# Patient Record
Sex: MALE | Race: WHITE | NOT HISPANIC OR LATINO | Employment: FULL TIME | URBAN - METROPOLITAN AREA
[De-identification: names, ages, dates, MRNs, and addresses within clinical notes are randomized per-mention and may not be internally consistent; named-entity substitution may affect disease eponyms.]

---
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
## 2020-02-04 ENCOUNTER — APPOINTMENT (OUTPATIENT)
Dept: RADIOLOGY | Facility: MEDICAL CENTER | Age: 20
End: 2020-02-04
Payer: OTHER MISCELLANEOUS

## 2020-02-04 ENCOUNTER — TRANSCRIBE ORDERS (OUTPATIENT)
Dept: ADMINISTRATIVE | Facility: HOSPITAL | Age: 20
End: 2020-02-04

## 2020-02-04 DIAGNOSIS — S52.501S CLOSED FRACTURE OF DISTAL END OF RIGHT RADIUS, UNSPECIFIED FRACTURE MORPHOLOGY, SEQUELA: ICD-10-CM

## 2020-02-04 DIAGNOSIS — S52.501S CLOSED FRACTURE OF DISTAL END OF RIGHT RADIUS, UNSPECIFIED FRACTURE MORPHOLOGY, SEQUELA: Primary | ICD-10-CM

## 2020-02-04 PROCEDURE — 73100 X-RAY EXAM OF WRIST: CPT

## 2020-07-06 ENCOUNTER — OFFICE VISIT (OUTPATIENT)
Dept: FAMILY MEDICINE CLINIC | Facility: CLINIC | Age: 20
End: 2020-07-06
Payer: COMMERCIAL

## 2020-07-06 VITALS
BODY MASS INDEX: 32.67 KG/M2 | OXYGEN SATURATION: 98 % | HEIGHT: 69 IN | WEIGHT: 220.6 LBS | TEMPERATURE: 98.9 F | SYSTOLIC BLOOD PRESSURE: 128 MMHG | HEART RATE: 109 BPM | DIASTOLIC BLOOD PRESSURE: 78 MMHG

## 2020-07-06 DIAGNOSIS — Z00.00 ANNUAL PHYSICAL EXAM: Primary | ICD-10-CM

## 2020-07-06 PROCEDURE — 99385 PREV VISIT NEW AGE 18-39: CPT | Performed by: PHYSICIAN ASSISTANT

## 2020-07-06 PROCEDURE — 3008F BODY MASS INDEX DOCD: CPT | Performed by: PHYSICIAN ASSISTANT

## 2020-07-06 NOTE — PROGRESS NOTES
[facility]    NAME: Robe Washburn  AGE: 21 y o  SEX: male  : 2000     DATE: 2020     Assessment and Plan:     Problem List Items Addressed This Visit     None      Visit Diagnoses     Annual physical exam    -  Primary    BMI 32 0-32 9,adult            Immunizations and preventive care screenings were discussed with patient today  Appropriate education was printed on patient's after visit summary  Counseling:  Dental Health: discussed importance of regular tooth brushing, flossing, and dental visits  · Exercise: the importance of regular exercise/physical activity was discussed  Recommend exercise 3-5 times per week for at least 30 minutes  Return in 1 year (on 2021) for Annual physical      Chief Complaint:     Chief Complaint   Patient presents with    Physical Exam     permit physical      History of Present Illness:     Adult Annual Physical   Patient here for a comprehensive physical exam  He is here to establish care and needs a 's physical  He is not taking any medications  He does not have any chronic problems  He is trying to get in with his dentist for his routine cleaning  He is due for an eye exam  He wears glasses when needed for far vision  The patient reports no problems  Diet and Physical Activity  · Diet/Nutrition: limited junk food  · Exercise: moderate cardiovascular exercise  Depression Screening  PHQ-9 Depression Screening    PHQ-9:    Frequency of the following problems over the past two weeks:       Little interest or pleasure in doing things:  0 - not at all  Feeling down, depressed, or hopeless:  0 - not at all  PHQ-2 Score:  0       General Health  · Sleep: sleeps well  · Hearing: normal - bilateral   · Vision: wears glasses  · Dental: regular dental visits and brushes teeth twice daily          Review of Systems:     Review of Systems   Constitutional: Negative for chills, diaphoresis, fatigue and fever  HENT: Negative for congestion, ear pain, postnasal drip, rhinorrhea, sneezing, sore throat and trouble swallowing  Eyes: Negative for pain and visual disturbance  Respiratory: Negative for apnea, cough, shortness of breath and wheezing  Cardiovascular: Negative for chest pain and palpitations  Gastrointestinal: Negative for abdominal pain, constipation, diarrhea, nausea and vomiting  Genitourinary: Negative for dysuria and hematuria  Musculoskeletal: Negative for arthralgias, gait problem and myalgias  Neurological: Negative for dizziness, syncope, weakness, light-headedness, numbness and headaches  Psychiatric/Behavioral: Negative for suicidal ideas  The patient is not nervous/anxious  Past Medical History:     History reviewed  No pertinent past medical history  Past Surgical History:     History reviewed  No pertinent surgical history     Social History:        Social History     Socioeconomic History    Marital status: Single     Spouse name: None    Number of children: None    Years of education: None    Highest education level: None   Occupational History    None   Social Needs    Financial resource strain: None    Food insecurity:     Worry: None     Inability: None    Transportation needs:     Medical: None     Non-medical: None   Tobacco Use    Smoking status: Former Smoker    Smokeless tobacco: Never Used   Substance and Sexual Activity    Alcohol use: Yes     Frequency: Monthly or less     Drinks per session: 3 or 4    Drug use: Not Currently    Sexual activity: None   Lifestyle    Physical activity:     Days per week: None     Minutes per session: None    Stress: None   Relationships    Social connections:     Talks on phone: None     Gets together: None     Attends Restorationism service: None     Active member of club or organization: None     Attends meetings of clubs or organizations: None     Relationship status: None    Intimate partner violence:     Fear of current or ex partner: None     Emotionally abused: None     Physically abused: None     Forced sexual activity: None   Other Topics Concern    None   Social History Narrative    None      Family History:     Family History   Problem Relation Age of Onset    No Known Problems Mother     No Known Problems Father       Current Medications:     No current outpatient medications on file  No current facility-administered medications for this visit  Allergies:     No Known Allergies   Physical Exam:     /78   Pulse (!) 109   Temp 98 9 °F (37 2 °C)   Ht 5' 9" (1 753 m)   Wt 100 kg (220 lb 9 6 oz)   SpO2 98%   BMI 32 58 kg/m²     Physical Exam   Constitutional: He is oriented to person, place, and time  He appears well-developed and well-nourished  HENT:   Head: Normocephalic and atraumatic  Right Ear: Tympanic membrane, external ear and ear canal normal    Left Ear: Tympanic membrane, external ear and ear canal normal    Nose: Nose normal    Mouth/Throat: Oropharynx is clear and moist and mucous membranes are normal  No oropharyngeal exudate, posterior oropharyngeal edema or posterior oropharyngeal erythema  Eyes: Pupils are equal, round, and reactive to light  EOM are normal    Neck: Normal range of motion  Neck supple  Cardiovascular: Normal rate, regular rhythm and normal heart sounds  Exam reveals no gallop and no friction rub  No murmur heard  Pulmonary/Chest: Effort normal and breath sounds normal  No respiratory distress  He has no wheezes  He has no rales  Abdominal: Soft  Bowel sounds are normal  There is no tenderness  There is no rebound and no guarding  Musculoskeletal: Normal range of motion  Lymphadenopathy:     He has no cervical adenopathy  Neurological: He is alert and oriented to person, place, and time  Skin: Skin is warm and dry  Psychiatric: He has a normal mood and affect   His behavior is normal  Judgment and thought content normal    Vitals reviewed  [facility]     BMI Counseling: Body mass index is 32 58 kg/m²  The BMI is above normal  Nutrition recommendations include decreasing overall calorie intake, 3-5 servings of fruits/vegetables daily and consuming healthier snacks  Exercise recommendations include exercising 3-5 times per week

## 2020-07-06 NOTE — PATIENT INSTRUCTIONS

## 2020-09-14 ENCOUNTER — TELEMEDICINE (OUTPATIENT)
Dept: FAMILY MEDICINE CLINIC | Facility: CLINIC | Age: 20
End: 2020-09-14
Payer: COMMERCIAL

## 2020-09-14 VITALS — WEIGHT: 220 LBS | HEIGHT: 69 IN | BODY MASS INDEX: 32.58 KG/M2

## 2020-09-14 DIAGNOSIS — Z20.828 EXPOSURE TO SARS-ASSOCIATED CORONAVIRUS: Primary | ICD-10-CM

## 2020-09-14 PROCEDURE — 3725F SCREEN DEPRESSION PERFORMED: CPT | Performed by: PHYSICIAN ASSISTANT

## 2020-09-14 PROCEDURE — 99212 OFFICE O/P EST SF 10 MIN: CPT | Performed by: PHYSICIAN ASSISTANT

## 2020-09-14 PROCEDURE — U0003 INFECTIOUS AGENT DETECTION BY NUCLEIC ACID (DNA OR RNA); SEVERE ACUTE RESPIRATORY SYNDROME CORONAVIRUS 2 (SARS-COV-2) (CORONAVIRUS DISEASE [COVID-19]), AMPLIFIED PROBE TECHNIQUE, MAKING USE OF HIGH THROUGHPUT TECHNOLOGIES AS DESCRIBED BY CMS-2020-01-R: HCPCS | Performed by: PHYSICIAN ASSISTANT

## 2020-09-14 NOTE — PROGRESS NOTES
COVID-19 Virtual Visit     Assessment/Plan:    Problem List Items Addressed This Visit     None      Visit Diagnoses     Exposure to SARS-associated coronavirus    -  Primary    Relevant Orders    Novel Coronavirus (COVID-19), PCR [facility] - Collected in Office        This virtual check-in was done via [email or web address] and patient was informed that this is not a secure, HIPAA-complaint platform  He agrees to proceed     Disposition:      I recommended self-quarantine for 14 days and to call back for worsening symptoms or development of shortness of breath and I recommended Jose Victor come to our office for a nasal swab for COVID-19    I spent 10 minutes directly with the patient during this visit     I advised him to stay under isolation until he receives the results of his COVID test  I recommended that he push fluids, continue symptomatic relief, and advance diet as tolerated  He will notify us of any new or worsening symptoms  Encounter provider Tank Cabrera PA-C    Provider located at [address] [address]    Recent Visits  No visits were found meeting these conditions  Showing recent visits within past 7 days and meeting all other requirements     Today's Visits  Date Type Provider Dept   09/14/20 Telemedicine Tank Cabrera PA-C [facility]   Showing today's visits and meeting all other requirements     Future Appointments  No visits were found meeting these conditions  Showing future appointments within next 150 days and meeting all other requirements        Patient agrees to participate in a virtual check in via telephone or video visit instead of presenting to the office to address urgent/immediate medical needs  Patient is aware this is a billable service  After connecting through [employer], the patient was identified by name and date of birth   Sahil Somerset was informed that this was a telemedicine visit and that the exam was being conducted confidentially over secure lines  My office door was closed  No one else was in the room  Christie Montoya acknowledged consent and understanding of privacy and security of the telemedicine visit  I informed the patient that I have reviewed his record in Epic and presented the opportunity for him to ask any questions regarding the visit today  The patient agreed to participate  Subjective  Christie Montoya is a 21 y o  male who is concerned about COVID-19  He reports headache, fatigue, abdominal pain, nausea and vomiting  He has not experienced fever, chills, repeated shaking with chills, cough, shortness of breath, sore throat, anorexia, myalgias, anosmia and diarrhea He has not had contact with a person who is under investigation for or who is positive for COVID-19 within the last 14 days  He has not been hospitalized recently for fever and/or lower respiratory symptoms  Carol Record is here today complaining of headache, body aches, abdominal pain, and fatigue  It started on Saturday, 9/12/20 while at work  He was sent home from work  He did vomit once while at work  Since being home he has not had anymore episodes of vomiting  He has been eating and drinking  He denies any diarrhea, chest pains, shortness of breath, anosmia, fevers or chills  He has had a stuffy nose and sore throat first thing in the morning for the past few weeks, but that resolves as the day goes on  He admits that the abdominal pain and headache have slightly improved  He has been taking ibuprofen as needed for the headache  He denies any known sick contacts  He denies any recent travel  History reviewed  No pertinent past medical history  History reviewed  No pertinent surgical history  No current outpatient medications on file  No current facility-administered medications for this visit  No Known Allergies    Review of Systems   Constitutional: Positive for fatigue  Negative for chills, diaphoresis and fever  HENT: Positive for congestion  Negative for ear pain, postnasal drip, rhinorrhea, sneezing, sore throat and trouble swallowing  Eyes: Negative for pain and visual disturbance  Respiratory: Negative for apnea, cough, shortness of breath and wheezing  Cardiovascular: Negative for chest pain and palpitations  Gastrointestinal: Positive for abdominal pain, nausea and vomiting  Negative for constipation and diarrhea  Genitourinary: Negative for dysuria and hematuria  Musculoskeletal: Positive for myalgias  Negative for arthralgias and gait problem  Neurological: Positive for headaches  Negative for dizziness, syncope, weakness, light-headedness and numbness  Psychiatric/Behavioral: Negative for suicidal ideas  The patient is not nervous/anxious  Video Exam    Vitals:    09/14/20 1427   Weight: 99 8 kg (220 lb)   Height: 5' 9" (1 753 m)     Randall Dee appears alert, no distress, cooperative  Physical Exam  Vitals signs and nursing note reviewed  Constitutional:       General: He is not in acute distress  Appearance: He is well-developed  He is not ill-appearing, toxic-appearing or diaphoretic  HENT:      Head: Normocephalic and atraumatic  Pulmonary:      Effort: No respiratory distress (Patient is speaking in full, fluent sentences)  Neurological:      Mental Status: He is alert and oriented to person, place, and time  Psychiatric:         Behavior: Behavior normal  Behavior is cooperative  Thought Content: Thought content normal          Judgment: Judgment normal           VIRTUAL VISIT DISCLAIMER    Adenike Reese acknowledges that he has consented to an online visit or consultation  He understands that the online visit is based solely on information provided by him, and that, in the absence of a face-to-face physical evaluation by the physician, the diagnosis he receives is both limited and provisional in terms of accuracy and completeness   This is not intended to replace a full medical face-to-face evaluation by the physician  Jose Adams understands and accepts these terms

## 2020-09-15 LAB — SARS-COV-2 RNA SPEC QL NAA+PROBE: NOT DETECTED

## 2020-09-16 ENCOUNTER — TELEPHONE (OUTPATIENT)
Dept: FAMILY MEDICINE CLINIC | Facility: CLINIC | Age: 20
End: 2020-09-16

## 2020-09-16 NOTE — TELEPHONE ENCOUNTER
Patient was negative for COVID, needs note stating so and that he can return to work  Please call Caitlin Egan when finished

## 2020-09-19 ENCOUNTER — APPOINTMENT (EMERGENCY)
Dept: CT IMAGING | Facility: HOSPITAL | Age: 20
End: 2020-09-19
Payer: COMMERCIAL

## 2020-09-19 ENCOUNTER — HOSPITAL ENCOUNTER (EMERGENCY)
Facility: HOSPITAL | Age: 20
Discharge: HOME | End: 2020-09-19
Attending: EMERGENCY MEDICINE | Admitting: EMERGENCY MEDICINE
Payer: COMMERCIAL

## 2020-09-19 VITALS
WEIGHT: 220.02 LBS | OXYGEN SATURATION: 94 % | HEART RATE: 66 BPM | DIASTOLIC BLOOD PRESSURE: 74 MMHG | HEIGHT: 69 IN | RESPIRATION RATE: 16 BRPM | SYSTOLIC BLOOD PRESSURE: 118 MMHG | TEMPERATURE: 97.8 F | BODY MASS INDEX: 32.59 KG/M2

## 2020-09-19 DIAGNOSIS — K57.90 DIVERTICULOSIS: ICD-10-CM

## 2020-09-19 DIAGNOSIS — R10.9 ABDOMINAL PAIN: Primary | ICD-10-CM

## 2020-09-19 DIAGNOSIS — K59.00 CONSTIPATION: ICD-10-CM

## 2020-09-19 LAB
ALBUMIN SERPL BCP-MCNC: 4 G/DL (ref 3.5–5)
ALP SERPL-CCNC: 104 U/L (ref 46–116)
ALT SERPL W P-5'-P-CCNC: 58 U/L (ref 12–78)
ANION GAP SERPL CALCULATED.3IONS-SCNC: 6 MMOL/L (ref 4–13)
AST SERPL W P-5'-P-CCNC: 37 U/L (ref 5–45)
BASOPHILS # BLD AUTO: 0.06 THOUSANDS/ΜL (ref 0–0.1)
BASOPHILS NFR BLD AUTO: 1 % (ref 0–1)
BILIRUB SERPL-MCNC: 0.5 MG/DL (ref 0.2–1)
BILIRUB UR QL STRIP: NEGATIVE
BUN SERPL-MCNC: 12 MG/DL (ref 5–25)
CALCIUM SERPL-MCNC: 9.2 MG/DL (ref 8.3–10.1)
CHLORIDE SERPL-SCNC: 103 MMOL/L (ref 100–108)
CLARITY UR: CLEAR
CO2 SERPL-SCNC: 30 MMOL/L (ref 21–32)
COLOR UR: YELLOW
CREAT SERPL-MCNC: 0.85 MG/DL (ref 0.6–1.3)
EOSINOPHIL # BLD AUTO: 0.12 THOUSAND/ΜL (ref 0–0.61)
EOSINOPHIL NFR BLD AUTO: 2 % (ref 0–6)
ERYTHROCYTE [DISTWIDTH] IN BLOOD BY AUTOMATED COUNT: 12.1 % (ref 11.6–15.1)
GFR SERPL CREATININE-BSD FRML MDRD: 125 ML/MIN/1.73SQ M
GLUCOSE SERPL-MCNC: 92 MG/DL (ref 65–140)
GLUCOSE UR STRIP-MCNC: NEGATIVE MG/DL
HCT VFR BLD AUTO: 42.3 % (ref 36.5–49.3)
HGB BLD-MCNC: 14.6 G/DL (ref 12–17)
HGB UR QL STRIP.AUTO: NEGATIVE
IMM GRANULOCYTES # BLD AUTO: 0.01 THOUSAND/UL (ref 0–0.2)
IMM GRANULOCYTES NFR BLD AUTO: 0 % (ref 0–2)
KETONES UR STRIP-MCNC: NEGATIVE MG/DL
LEUKOCYTE ESTERASE UR QL STRIP: NEGATIVE
LIPASE SERPL-CCNC: 72 U/L (ref 73–393)
LYMPHOCYTES # BLD AUTO: 2.35 THOUSANDS/ΜL (ref 0.6–4.47)
LYMPHOCYTES NFR BLD AUTO: 36 % (ref 14–44)
MCH RBC QN AUTO: 31.2 PG (ref 26.8–34.3)
MCHC RBC AUTO-ENTMCNC: 34.5 G/DL (ref 31.4–37.4)
MCV RBC AUTO: 90 FL (ref 82–98)
MONOCYTES # BLD AUTO: 0.94 THOUSAND/ΜL (ref 0.17–1.22)
MONOCYTES NFR BLD AUTO: 15 % (ref 4–12)
NEUTROPHILS # BLD AUTO: 3 THOUSANDS/ΜL (ref 1.85–7.62)
NEUTS SEG NFR BLD AUTO: 46 % (ref 43–75)
NITRITE UR QL STRIP: NEGATIVE
NRBC BLD AUTO-RTO: 0 /100 WBCS
PH UR STRIP.AUTO: 6.5 [PH]
PLATELET # BLD AUTO: 255 THOUSANDS/UL (ref 149–390)
PMV BLD AUTO: 10.1 FL (ref 8.9–12.7)
POTASSIUM SERPL-SCNC: 4.2 MMOL/L (ref 3.5–5.3)
PROT SERPL-MCNC: 7.7 G/DL (ref 6.4–8.2)
PROT UR STRIP-MCNC: NEGATIVE MG/DL
RBC # BLD AUTO: 4.68 MILLION/UL (ref 3.88–5.62)
SODIUM SERPL-SCNC: 139 MMOL/L (ref 136–145)
SP GR UR STRIP.AUTO: 1.01 (ref 1–1.03)
UROBILINOGEN UR QL STRIP.AUTO: 0.2 E.U./DL
WBC # BLD AUTO: 6.48 THOUSAND/UL (ref 4.31–10.16)

## 2020-09-19 PROCEDURE — 83690 ASSAY OF LIPASE: CPT | Performed by: EMERGENCY MEDICINE

## 2020-09-19 PROCEDURE — 96360 HYDRATION IV INFUSION INIT: CPT

## 2020-09-19 PROCEDURE — 81003 URINALYSIS AUTO W/O SCOPE: CPT | Performed by: EMERGENCY MEDICINE

## 2020-09-19 PROCEDURE — G1004 CDSM NDSC: HCPCS

## 2020-09-19 PROCEDURE — 74177 CT ABD & PELVIS W/CONTRAST: CPT

## 2020-09-19 PROCEDURE — 99284 EMERGENCY DEPT VISIT MOD MDM: CPT | Performed by: EMERGENCY MEDICINE

## 2020-09-19 PROCEDURE — 36415 COLL VENOUS BLD VENIPUNCTURE: CPT | Performed by: EMERGENCY MEDICINE

## 2020-09-19 PROCEDURE — 85025 COMPLETE CBC W/AUTO DIFF WBC: CPT | Performed by: EMERGENCY MEDICINE

## 2020-09-19 PROCEDURE — 80053 COMPREHEN METABOLIC PANEL: CPT | Performed by: EMERGENCY MEDICINE

## 2020-09-19 PROCEDURE — 99284 EMERGENCY DEPT VISIT MOD MDM: CPT

## 2020-09-19 RX ORDER — ACETAMINOPHEN 325 MG/1
650 TABLET ORAL EVERY 6 HOURS PRN
COMMUNITY

## 2020-09-19 RX ORDER — FAMOTIDINE 20 MG/1
20 TABLET, FILM COATED ORAL 2 TIMES DAILY
Qty: 14 TABLET | Refills: 0 | Status: SHIPPED | OUTPATIENT
Start: 2020-09-19 | End: 2020-09-26

## 2020-09-19 RX ADMIN — SODIUM CHLORIDE 1000 ML: 0.9 INJECTION, SOLUTION INTRAVENOUS at 08:22

## 2020-09-19 RX ADMIN — IOHEXOL 100 ML: 350 INJECTION, SOLUTION INTRAVENOUS at 08:59

## 2020-09-19 NOTE — ED PROVIDER NOTES
History  Chief Complaint   Patient presents with    Abdominal Pain     pt states intermittent abdominal pain for about 1 week  in mid abdomen and into RLQ  worse this am     26-year-old male presents complaining of right lower quadrant periumbilical abdominal pain x1 week  Patient states he has had the pain on off for about a week  He states that when he went to work today at approximately 0600 hours the pain intensified  He denies pain into the groin or scrotum or testicles  He denies nausea or vomiting  Denies chest pain or shortness of breath  He states that pain gets worse when he has bowel movement  History provided by:  Patient  Abdominal Pain   Pain location:  RLQ and periumbilical  Pain quality: aching and cramping    Pain radiates to:  Does not radiate  Pain severity:  Mild  Onset quality:  Gradual  Duration:  1 week  Timing:  Intermittent  Progression:  Waxing and waning  Context: not alcohol use, not awakening from sleep, not diet changes and not eating    Relieved by:  Nothing  Worsened by: Bowel movements  Ineffective treatments:  None tried  Associated symptoms: no anorexia, no belching, no chest pain and no chills    Risk factors: no alcohol abuse and no aspirin use        Prior to Admission Medications   Prescriptions Last Dose Informant Patient Reported? Taking?   acetaminophen (TYLENOL) 325 mg tablet   Yes Yes   Sig: Take 650 mg by mouth every 6 (six) hours as needed for mild pain      Facility-Administered Medications: None       History reviewed  No pertinent past medical history  History reviewed  No pertinent surgical history  Family History   Problem Relation Age of Onset    No Known Problems Mother     No Known Problems Father      I have reviewed and agree with the history as documented      E-Cigarette/Vaping    E-Cigarette Use Former User      E-Cigarette/Vaping Substances     Social History     Tobacco Use    Smoking status: Former Smoker    Smokeless tobacco: Never Used   Substance Use Topics    Alcohol use: Not Currently     Frequency: Monthly or less     Drinks per session: 3 or 4    Drug use: Not Currently       Review of Systems   Constitutional: Negative for chills  HENT: Negative  Eyes: Negative  Respiratory: Negative  Cardiovascular: Negative for chest pain  Gastrointestinal: Positive for abdominal pain  Negative for anorexia  Endocrine: Negative  Genitourinary: Negative  Musculoskeletal: Negative  Skin: Negative  Allergic/Immunologic: Negative  Neurological: Negative  Hematological: Negative  Psychiatric/Behavioral: Negative  All other systems reviewed and are negative  Physical Exam  Physical Exam  Vitals signs reviewed  HENT:      Head: Normocephalic  Mouth/Throat:      Mouth: Mucous membranes are moist    Eyes:      Extraocular Movements: Extraocular movements intact  Cardiovascular:      Rate and Rhythm: Normal rate  Heart sounds: Normal heart sounds  Pulmonary:      Effort: Pulmonary effort is normal    Abdominal:      General: Bowel sounds are normal       Tenderness: There is abdominal tenderness in the right lower quadrant and periumbilical area  There is no right CVA tenderness or guarding  Negative signs include Segundo's sign and McBurney's sign  Hernia: No hernia is present  Skin:     Capillary Refill: Capillary refill takes less than 2 seconds  Coloration: Skin is not cyanotic, jaundiced or mottled  Neurological:      General: No focal deficit present  Mental Status: He is alert     Psychiatric:         Mood and Affect: Mood normal          Vital Signs  ED Triage Vitals [09/19/20 0803]   Temperature Pulse Respirations Blood Pressure SpO2   97 8 °F (36 6 °C) 74 16 145/77 95 %      Temp Source Heart Rate Source Patient Position - Orthostatic VS BP Location FiO2 (%)   Temporal Monitor Sitting Left arm --      Pain Score       9           Vitals:    09/19/20 0803 09/19/20 0830 09/19/20 0900   BP: 145/77 128/74 118/74   Pulse: 74 78 66   Patient Position - Orthostatic VS: Sitting           Visual Acuity      ED Medications  Medications   sodium chloride 0 9 % bolus 1,000 mL (0 mL Intravenous Stopped 9/19/20 0923)   iohexol (OMNIPAQUE) 350 MG/ML injection (MULTI-DOSE) 100 mL (100 mL Intravenous Given 9/19/20 0859)       Diagnostic Studies  Results Reviewed     Procedure Component Value Units Date/Time    UA w Reflex to Microscopic w Reflex to Culture [[id number]] Collected:  09/19/20 0923    Lab Status:  Final result Specimen:  Urine, Clean Catch Updated:  09/19/20 0936     Color, UA Yellow     Clarity, UA Clear     Specific Gravity, UA 1 010     pH, UA 6 5     Leukocytes, UA Negative     Nitrite, UA Negative     Protein, UA Negative mg/dl      Glucose, UA Negative mg/dl      Ketones, UA Negative mg/dl      Urobilinogen, UA 0 2 E U /dl      Bilirubin, UA Negative     Blood, UA Negative    Comprehensive metabolic panel [[id number]] Collected:  09/19/20 0820    Lab Status:  Final result Specimen:  Blood from Arm, Right Updated:  09/19/20 0843     Sodium 139 mmol/L      Potassium 4 2 mmol/L      Chloride 103 mmol/L      CO2 30 mmol/L      ANION GAP 6 mmol/L      BUN 12 mg/dL      Creatinine 0 85 mg/dL      Glucose 92 mg/dL      Calcium 9 2 mg/dL      AST 37 U/L      ALT 58 U/L      Alkaline Phosphatase 104 U/L      Total Protein 7 7 g/dL      Albumin 4 0 g/dL      Total Bilirubin 0 50 mg/dL      eGFR 125 ml/min/1 73sq m     Narrative:       [place]ney guidelines for Chronic Kidney Disease (CKD):     Stage 1 with normal or high GFR (GFR > 90 mL/min/1 73 square meters)    Stage 2 Mild CKD (GFR = 60-89 mL/min/1 73 square meters)    Stage 3A Moderate CKD (GFR = 45-59 mL/min/1 73 square meters)    Stage 3B Moderate CKD (GFR = 30-44 mL/min/1 73 square meters)    Stage 4 Severe CKD (GFR = 15-29 mL/min/1 73 square meters)    Stage 5 End Stage CKD (GFR <15 mL/min/1 73 square meters)  Note: GFR calculation is accurate only with a steady state creatinine    Lipase [[id number]]  (Abnormal) Collected:  09/19/20 0820    Lab Status:  Final result Specimen:  Blood from Arm, Right Updated:  09/19/20 0837     Lipase 72 u/L     CBC and differential [[id number]]  (Abnormal) Collected:  09/19/20 0831    Lab Status:  Final result Specimen:  Blood from Hand, Left Updated:  09/19/20 0836     WBC 6 48 Thousand/uL      RBC 4 68 Million/uL      Hemoglobin 14 6 g/dL      Hematocrit 42 3 %      MCV 90 fL      MCH 31 2 pg      MCHC 34 5 g/dL      RDW 12 1 %      MPV 10 1 fL      Platelets 309 Thousands/uL      nRBC 0 /100 WBCs      Neutrophils Relative 46 %      Immat GRANS % 0 %      Lymphocytes Relative 36 %      Monocytes Relative 15 %      Eosinophils Relative 2 %      Basophils Relative 1 %      Neutrophils Absolute 3 00 Thousands/µL      Immature Grans Absolute 0 01 Thousand/uL      Lymphocytes Absolute 2 35 Thousands/µL      Monocytes Absolute 0 94 Thousand/µL      Eosinophils Absolute 0 12 Thousand/µL      Basophils Absolute 0 06 Thousands/µL                  CT abdomen pelvis with contrast   Final Result by Carmen Juárez DO (09/19 0579)   No CT evidence of acute appendicitis  Workstation performed: [id number]                    Procedures  Procedures         ED Course                                       MDM  Number of Diagnoses or Management Options  Diagnosis management comments: Differential diagnosis 1  Pedis eyes 2  Colitis 3  Diverticulitis 4   Bowel obstruction       Amount and/or Complexity of Data Reviewed  Clinical lab tests: ordered and reviewed  Tests in the radiology section of CPT®: ordered and reviewed  Tests in the medicine section of CPT®: reviewed and ordered    Risk of Complications, Morbidity, and/or Mortality  Presenting problems: high  Diagnostic procedures: high  Management options: high        Disposition  Final diagnoses:   Abdominal pain   Diverticulosis Constipation     Time reflects when diagnosis was documented in both MDM as applicable and the Disposition within this note     Time User Action Codes Description Comment    9/19/2020  8:07 AM Claybon Ora Add [R10 9] Abdominal pain     9/19/2020  9:22 AM Claybon Ora Add [K57 90] Diverticulosis     9/19/2020  9:22 AM Claybon Ora Add [K59 00] Constipation       ED Disposition     ED Disposition Condition Date/Time Comment    Discharge Stable Sat Sep 19, 2020  8:07 AM Segundo Hinesl discharge to home/self care  Follow-up Information     Follow up With Specialties Details Why Contact Info Additional [address], PA-C Family Medicine, Physician Assistant   [address] [address]  [address]        [facility]   [address] [address]  [address] [address] [facility] Marissa Vargas [address] [place] [address], [id number] [phone]          Patient's Medications   Discharge Prescriptions    No medications on file     No discharge procedures on file      PDMP Review     None          ED Provider  Electronically Signed by           Tova Coreas DO  09/19/20 5157

## 2020-09-19 NOTE — Clinical Note
Brainkj Pegtigre was seen and treated in our emergency department on 9/19/2020  Diagnosis:     Merlinda Rock    He may return on this date:     Excuse on 9/19/20      If you have any questions or concerns, please don't hesitate to call        Jose Manuel Bryson DO    ______________________________           _______________          _______________  Hospital Representative                              Date                                Time

## 2020-09-19 NOTE — Clinical Note
Segundo Romero was seen and treated in our emergency department on 9/19/2020  Diagnosis:     Lisette Lincoln    He may return on this date:     Excuse on 9/19/20      If you have any questions or concerns, please don't hesitate to call        Tova Coreas DO    ______________________________           _______________          _______________  Hospital Representative                              Date                                Time

## 2020-09-22 ENCOUNTER — OFFICE VISIT (OUTPATIENT)
Dept: FAMILY MEDICINE CLINIC | Facility: CLINIC | Age: 20
End: 2020-09-22
Payer: COMMERCIAL

## 2020-09-22 VITALS
OXYGEN SATURATION: 94 % | WEIGHT: 223.8 LBS | BODY MASS INDEX: 33.15 KG/M2 | SYSTOLIC BLOOD PRESSURE: 122 MMHG | DIASTOLIC BLOOD PRESSURE: 70 MMHG | HEART RATE: 124 BPM | TEMPERATURE: 97.6 F | HEIGHT: 69 IN

## 2020-09-22 DIAGNOSIS — K59.00 CONSTIPATION, UNSPECIFIED CONSTIPATION TYPE: Primary | ICD-10-CM

## 2020-09-22 PROCEDURE — 1036F TOBACCO NON-USER: CPT | Performed by: PHYSICIAN ASSISTANT

## 2020-09-22 PROCEDURE — 99213 OFFICE O/P EST LOW 20 MIN: CPT | Performed by: PHYSICIAN ASSISTANT

## 2020-09-22 NOTE — PROGRESS NOTES
Assessment/Plan:    Problem List Items Addressed This Visit     None      Visit Diagnoses     Constipation, unspecified constipation type    -  Primary           Diagnoses and all orders for this visit:    Constipation, unspecified constipation type        I advised Emily Rodriguez to continue with the Miralax  Push fluids and make sure he is increasing his intake of fiber  Suggested trying a suppository if that does not help  He will let us know if symptoms do not improve or worsen  Subjective:      Patient ID: Alonso Toscano is a 21 y o  male  Emily Rodriguez is here today complaining of constipation and abdominal pain  On Saturday morning he started with some abdominal pain  He felt like he needed to have a bowl movement, but couldn't  He went to the ER  They did a CT scan, which did not show a bowel blockage or any acute processes  They advised him to start Miralax and push fluids  Sunday he took one dose of Miralax and had 2 small bowel movements  He felt slightly better after having the bowel movement  He took another dose of Miralax on Monday, and had 2 small bowel movements again  He did not take anymore Miralax because he was not sure how long he could take it  He is starting again with some lower abdominal pain and pressure  He has not had a bowel movement so far today  He denies any fevers, chills, blood in his stool, nausea, or vomiting  He has been passing flatus  He denies any other concerns at this time  The following portions of the patient's history were reviewed and updated as appropriate:   He has no past medical history on file  ,  does not have a problem list on file  ,   has no past surgical history on file  ,  family history includes No Known Problems in his father and mother  ,   reports that he has quit smoking  He has never used smokeless tobacco  He reports previous alcohol use  He reports previous drug use ,  has No Known Allergies     Current Outpatient Medications   Medication Sig Dispense Refill    acetaminophen (TYLENOL) 325 mg tablet Take 650 mg by mouth every 6 (six) hours as needed for mild pain      famotidine (PEPCID) 20 mg tablet Take 1 tablet (20 mg total) by mouth 2 (two) times a day for 7 days (Patient not taking: Reported on 9/22/2020) 14 tablet 0     No current facility-administered medications for this visit  Review of Systems   Constitutional: Negative for chills, diaphoresis, fatigue and fever  HENT: Negative for congestion, ear pain, postnasal drip, rhinorrhea, sneezing, sore throat and trouble swallowing  Eyes: Negative for pain and visual disturbance  Respiratory: Negative for apnea, cough, shortness of breath and wheezing  Cardiovascular: Negative for chest pain and palpitations  Gastrointestinal: Positive for abdominal pain and constipation  Negative for diarrhea, nausea and vomiting  Genitourinary: Negative for dysuria and hematuria  Musculoskeletal: Negative for arthralgias, gait problem and myalgias  Neurological: Negative for dizziness, syncope, weakness, light-headedness, numbness and headaches  Psychiatric/Behavioral: Negative for suicidal ideas  The patient is not nervous/anxious  Objective:  Vitals:    09/22/20 1228   BP: 122/70   Pulse: (!) 124   Temp: 97 6 °F (36 4 °C)   SpO2: 94%   Weight: 102 kg (223 lb 12 8 oz)   Height: 5' 9" (1 753 m)     Body mass index is 33 05 kg/m²  Physical Exam  Vitals signs and nursing note reviewed  Constitutional:       Appearance: He is well-developed  HENT:      Head: Normocephalic and atraumatic  Right Ear: Tympanic membrane, ear canal and external ear normal       Left Ear: Tympanic membrane, ear canal and external ear normal       Nose: Nose normal       Mouth/Throat:      Pharynx: No oropharyngeal exudate or posterior oropharyngeal erythema  Eyes:      Pupils: Pupils are equal, round, and reactive to light  Neck:      Musculoskeletal: Normal range of motion and neck supple  Cardiovascular:      Rate and Rhythm: Normal rate and regular rhythm  Heart sounds: Normal heart sounds  No murmur  No friction rub  No gallop  Pulmonary:      Effort: Pulmonary effort is normal  No respiratory distress  Breath sounds: Normal breath sounds  No wheezing or rales  Abdominal:      General: Bowel sounds are normal       Palpations: Abdomen is soft  Tenderness: There is abdominal tenderness (Mild left lower abdominal tenderness to palpation  )  There is no guarding or rebound  Musculoskeletal: Normal range of motion  Lymphadenopathy:      Cervical: No cervical adenopathy  Skin:     General: Skin is warm and dry  Neurological:      Mental Status: He is alert and oriented to person, place, and time  Psychiatric:         Behavior: Behavior normal          Thought Content:  Thought content normal          Judgment: Judgment normal

## 2020-11-11 ENCOUNTER — TELEPHONE (OUTPATIENT)
Dept: FAMILY MEDICINE CLINIC | Facility: CLINIC | Age: 20
End: 2020-11-11

## 2021-01-24 DIAGNOSIS — Z23 ENCOUNTER FOR IMMUNIZATION: ICD-10-CM

## 2021-01-28 ENCOUNTER — IMMUNIZATIONS (OUTPATIENT)
Dept: FAMILY MEDICINE CLINIC | Facility: HOSPITAL | Age: 21
End: 2021-01-28

## 2021-01-28 DIAGNOSIS — Z23 ENCOUNTER FOR IMMUNIZATION: Primary | ICD-10-CM

## 2021-01-28 PROCEDURE — 91301 SARS-COV-2 / COVID-19 MRNA VACCINE (MODERNA) 100 MCG: CPT

## 2021-01-28 PROCEDURE — 0011A SARS-COV-2 / COVID-19 MRNA VACCINE (MODERNA) 100 MCG: CPT

## 2021-02-23 ENCOUNTER — IMMUNIZATIONS (OUTPATIENT)
Dept: FAMILY MEDICINE CLINIC | Facility: HOSPITAL | Age: 21
End: 2021-02-23

## 2021-02-23 DIAGNOSIS — Z23 ENCOUNTER FOR IMMUNIZATION: Primary | ICD-10-CM

## 2021-02-23 PROCEDURE — 0012A SARS-COV-2 / COVID-19 MRNA VACCINE (MODERNA) 100 MCG: CPT

## 2021-02-23 PROCEDURE — 91301 SARS-COV-2 / COVID-19 MRNA VACCINE (MODERNA) 100 MCG: CPT

## 2021-06-09 ENCOUNTER — TELEMEDICINE (OUTPATIENT)
Dept: FAMILY MEDICINE CLINIC | Facility: CLINIC | Age: 21
End: 2021-06-09
Payer: COMMERCIAL

## 2021-06-09 VITALS — HEIGHT: 69 IN | BODY MASS INDEX: 33.03 KG/M2 | TEMPERATURE: 97.9 F | WEIGHT: 223 LBS

## 2021-06-09 DIAGNOSIS — J06.9 UPPER RESPIRATORY TRACT INFECTION, UNSPECIFIED TYPE: Primary | ICD-10-CM

## 2021-06-09 PROCEDURE — 99213 OFFICE O/P EST LOW 20 MIN: CPT | Performed by: PHYSICIAN ASSISTANT

## 2021-06-09 PROCEDURE — 3008F BODY MASS INDEX DOCD: CPT | Performed by: PHYSICIAN ASSISTANT

## 2021-06-09 RX ORDER — PENICILLIN V POTASSIUM 500 MG/1
TABLET ORAL
COMMUNITY
Start: 2021-05-26 | End: 2021-11-09 | Stop reason: ALTCHOICE

## 2021-06-09 RX ORDER — FLUTICASONE PROPIONATE 50 MCG
1 SPRAY, SUSPENSION (ML) NASAL DAILY
Qty: 9.9 ML | Refills: 0 | Status: SHIPPED | OUTPATIENT
Start: 2021-06-09

## 2021-06-09 RX ORDER — AMOXICILLIN 500 MG/1
500 CAPSULE ORAL EVERY 8 HOURS SCHEDULED
Qty: 30 CAPSULE | Refills: 0 | Status: SHIPPED | OUTPATIENT
Start: 2021-06-09 | End: 2021-06-19

## 2021-06-09 RX ORDER — OMEPRAZOLE 10 MG/1
10 CAPSULE, DELAYED RELEASE ORAL DAILY
COMMUNITY

## 2021-06-09 NOTE — PROGRESS NOTES
Virtual Regular Visit      Assessment/Plan:    Problem List Items Addressed This Visit     None      Visit Diagnoses     Upper respiratory tract infection, unspecified type    -  Primary    Relevant Medications    penicillin V potassium (VEETID) 500 mg tablet    amoxicillin (AMOXIL) 500 mg capsule    fluticasone (FLONASE) 50 mcg/act nasal spray        Script for amoxicillin  I advised him not to start the penicillin on Sunday and call the dentist to let them know that he is being treated for a URI  Explained that he should not take them both at the same time  Recommended that he continue symptomatic treatment and notify us of any new or worsening symptoms  Reason for visit is   Chief Complaint   Patient presents with    Cold Like Symptoms     Runny nose, sore throat, congestion and pressure, headache, going on for 1 week  Used OTC but not helping      Virtual Regular Visit        Encounter provider Kala Reyes PA-C    Provider located at [address] [id number]      Recent Visits  No visits were found meeting these conditions  Showing recent visits within past 7 days and meeting all other requirements     Today's Visits  Date Type Provider Dept   06/09/21 Telemedicine Kala Reyes PA-C [facility]   Showing today's visits and meeting all other requirements     Future Appointments  No visits were found meeting these conditions  Showing future appointments within next 150 days and meeting all other requirements        The patient was identified by name and date of birth  Dolly Chapa was informed that this is a telemedicine visit and that the visit is being conducted through [address] [place] [address] Now and patient was informed that this is a secure, HIPAA-compliant platform  He agrees to proceed     My office door was closed  No one else was in the room  He acknowledged consent and understanding of privacy and security of the video platform   The patient has agreed to participate and understands they can discontinue the visit at any time  Patient is aware this is a billable service  Subjective  Dolly Chapa is a 24 y o  male  Wes Palma is a 24year old male who is here today complaining of a headache, post nasal drip, sore throat, sinus pressure, blocked ears, sneezing, and cough for the past week  He has been taking OTC allergy medication because he thought it was his allergies, but it is not improving  He is vaccinated and has not been around anyone with COVID  He denies any fevers, chills, body aches, nausea, vomiting, diarrhea, loss of taste, loss of smell, chest pains, or shortness of breath  He was prescribed penicillin that he is supposed to start on Sunday for an upcoming dental procedure  No past medical history on file  No past surgical history on file  Current Outpatient Medications   Medication Sig Dispense Refill    acetaminophen (TYLENOL) 325 mg tablet Take 650 mg by mouth every 6 (six) hours as needed for mild pain      omeprazole (PriLOSEC) 10 mg delayed release capsule Take 10 mg by mouth daily      amoxicillin (AMOXIL) 500 mg capsule Take 1 capsule (500 mg total) by mouth every 8 (eight) hours for 10 days 30 capsule 0    famotidine (PEPCID) 20 mg tablet Take 1 tablet (20 mg total) by mouth 2 (two) times a day for 7 days (Patient not taking: Reported on 9/22/2020) 14 tablet 0    fluticasone (FLONASE) 50 mcg/act nasal spray 1 spray into each nostril daily 9 9 mL 0    penicillin V potassium (VEETID) 500 mg tablet        No current facility-administered medications for this visit  No Known Allergies    Review of Systems   Constitutional: Negative for chills, diaphoresis, fatigue and fever  HENT: Positive for congestion, postnasal drip, rhinorrhea, sinus pressure, sneezing and sore throat  Negative for ear pain and trouble swallowing  +Blocked ears   Eyes: Negative for pain and visual disturbance  Respiratory: Positive for cough  Negative for apnea, shortness of breath and wheezing  Cardiovascular: Negative for chest pain and palpitations  Gastrointestinal: Negative for abdominal pain, constipation, diarrhea, nausea and vomiting  Genitourinary: Negative for dysuria and hematuria  Musculoskeletal: Negative for arthralgias, gait problem and myalgias  Neurological: Positive for headaches  Negative for dizziness, syncope, weakness, light-headedness and numbness  Psychiatric/Behavioral: Negative for suicidal ideas  The patient is not nervous/anxious  Video Exam    Vitals:    06/09/21 0833   Temp: 97 9 °F (36 6 °C)   Weight: 101 kg (223 lb)   Height: 5' 9" (1 753 m)       Physical Exam  Constitutional:       General: He is not in acute distress  Appearance: He is well-developed  He is not ill-appearing, toxic-appearing or diaphoretic  HENT:      Head: Normocephalic and atraumatic  Mouth/Throat:      Pharynx: No oropharyngeal exudate (per patient)  Pulmonary:      Effort: Pulmonary effort is normal  No respiratory distress (Patient is speaking in full, fluent sentences without any respiratory distress)  Lymphadenopathy:      Cervical: No cervical adenopathy (per patient)  Neurological:      Mental Status: He is alert and oriented to person, place, and time  Psychiatric:         Behavior: Behavior normal  Behavior is cooperative  Thought Content: Thought content normal          Judgment: Judgment normal           I spent 10 minutes directly with the patient during this visit      VIRTUAL VISIT DISCLAIMER    Louisa Umanzor acknowledges that he has consented to an online visit or consultation  He understands that the online visit is based solely on information provided by him, and that, in the absence of a face-to-face physical evaluation by the physician, the diagnosis he receives is both limited and provisional in terms of accuracy and completeness   This is not intended to replace a full medical face-to-face evaluation by the physician  Carlos Reich understands and accepts these terms

## 2021-09-20 ENCOUNTER — OFFICE VISIT (OUTPATIENT)
Dept: FAMILY MEDICINE CLINIC | Facility: CLINIC | Age: 21
End: 2021-09-20
Payer: COMMERCIAL

## 2021-09-20 VITALS
BODY MASS INDEX: 34.45 KG/M2 | HEIGHT: 69 IN | OXYGEN SATURATION: 94 % | WEIGHT: 232.6 LBS | SYSTOLIC BLOOD PRESSURE: 120 MMHG | TEMPERATURE: 98.8 F | HEART RATE: 69 BPM | DIASTOLIC BLOOD PRESSURE: 64 MMHG

## 2021-09-20 DIAGNOSIS — F41.1 GENERALIZED ANXIETY DISORDER: Primary | ICD-10-CM

## 2021-09-20 DIAGNOSIS — F41.0 PANIC DISORDER: ICD-10-CM

## 2021-09-20 PROCEDURE — 3008F BODY MASS INDEX DOCD: CPT | Performed by: PHYSICIAN ASSISTANT

## 2021-09-20 PROCEDURE — 3725F SCREEN DEPRESSION PERFORMED: CPT | Performed by: PHYSICIAN ASSISTANT

## 2021-09-20 PROCEDURE — 99214 OFFICE O/P EST MOD 30 MIN: CPT | Performed by: PHYSICIAN ASSISTANT

## 2021-09-20 RX ORDER — SERTRALINE HYDROCHLORIDE 25 MG/1
25 TABLET, FILM COATED ORAL DAILY
Qty: 30 TABLET | Refills: 0 | Status: SHIPPED | OUTPATIENT
Start: 2021-09-20 | End: 2021-11-23

## 2021-09-20 NOTE — PROGRESS NOTES
Assessment/Plan:    Problem List Items Addressed This Visit     None      Visit Diagnoses     Generalized anxiety disorder    -  Primary    Relevant Medications    sertraline (ZOLOFT) 25 mg tablet           Diagnoses and all orders for this visit:    Generalized anxiety disorder  -     sertraline (ZOLOFT) 25 mg tablet; Take 1 tablet (25 mg total) by mouth daily        Will start on 25 mg of zoloft  Follow-up in 4 weeks for a recheck or sooner if needed  Subjective:      Patient ID: Janice Howe is a 24 y o  male  Ishan Thorne is a pleasant 24year old male who is here today for a follow-up from the ER  He was seen for chest tightness and shortness of breath  They diagnosed him with a panic attack  He was advised to follow-up with his PCP  He has been having trouble with anxiety for many years  This particularly got worse in 2016 after the death of his uncle, whom he was very close with  He has gone to counseling in the past, but felt he got everything he could out of it  Recently he has noticed an increase in his amount of stress  He also does not sleep well and has racing thoughts  He has never been on medication, but is willing to discuss options  He denies any suicidal or homicidal thoughts  The following portions of the patient's history were reviewed and updated as appropriate:   He has no past medical history on file  ,  does not have a problem list on file  ,   has no past surgical history on file  ,  family history includes No Known Problems in his father and mother  ,   reports that he has been smoking  He has never used smokeless tobacco  He reports previous alcohol use  He reports previous drug use ,  has No Known Allergies     Current Outpatient Medications   Medication Sig Dispense Refill    acetaminophen (TYLENOL) 325 mg tablet Take 650 mg by mouth every 6 (six) hours as needed for mild pain      omeprazole (PriLOSEC) 10 mg delayed release capsule Take 10 mg by mouth daily      famotidine (PEPCID) 20 mg tablet Take 1 tablet (20 mg total) by mouth 2 (two) times a day for 7 days (Patient not taking: Reported on 9/22/2020) 14 tablet 0    fluticasone (FLONASE) 50 mcg/act nasal spray 1 spray into each nostril daily (Patient not taking: Reported on 9/20/2021) 9 9 mL 0    penicillin V potassium (VEETID) 500 mg tablet  (Patient not taking: Reported on 9/20/2021)      sertraline (ZOLOFT) 25 mg tablet Take 1 tablet (25 mg total) by mouth daily 30 tablet 0     No current facility-administered medications for this visit  Review of Systems   Constitutional: Negative for chills, diaphoresis, fatigue and fever  HENT: Negative for congestion, ear pain, postnasal drip, rhinorrhea, sneezing, sore throat and trouble swallowing  Eyes: Negative for pain and visual disturbance  Respiratory: Negative for apnea, cough, shortness of breath and wheezing  Cardiovascular: Negative for chest pain and palpitations  Gastrointestinal: Negative for abdominal pain, constipation, diarrhea, nausea and vomiting  Genitourinary: Negative for dysuria  Musculoskeletal: Negative for arthralgias, gait problem and myalgias  Neurological: Negative for dizziness, syncope, weakness, light-headedness, numbness and headaches  Psychiatric/Behavioral: Positive for sleep disturbance  Negative for suicidal ideas  The patient is nervous/anxious  Objective:  Vitals:    09/20/21 1358   BP: 120/64   Pulse: 69   Temp: 98 8 °F (37 1 °C)   SpO2: 94%   Weight: 106 kg (232 lb 9 6 oz)   Height: 5' 9" (1 753 m)     Body mass index is 34 35 kg/m²  Physical Exam  Vitals and nursing note reviewed  Constitutional:       Appearance: He is well-developed  HENT:      Head: Normocephalic and atraumatic  Right Ear: External ear normal       Left Ear: External ear normal       Nose: Nose normal       Mouth/Throat:      Pharynx: No oropharyngeal exudate or posterior oropharyngeal erythema     Eyes:      Pupils: Pupils are equal, round, and reactive to light  Cardiovascular:      Rate and Rhythm: Normal rate and regular rhythm  Heart sounds: Normal heart sounds  No murmur heard  No friction rub  No gallop  Pulmonary:      Effort: Pulmonary effort is normal  No respiratory distress  Breath sounds: Normal breath sounds  No wheezing or rales  Musculoskeletal:         General: Normal range of motion  Cervical back: Normal range of motion and neck supple  Lymphadenopathy:      Cervical: No cervical adenopathy  Skin:     General: Skin is warm and dry  Neurological:      Mental Status: He is alert and oriented to person, place, and time  Psychiatric:         Mood and Affect: Mood is anxious  Mood is not depressed  Affect is not tearful  Behavior: Behavior normal          Thought Content: Thought content normal  Thought content does not include homicidal or suicidal ideation  Thought content does not include homicidal or suicidal plan  Judgment: Judgment normal          BMI Counseling: Body mass index is 34 35 kg/m²  The BMI is above normal  Nutrition recommendations include decreasing overall calorie intake and 3-5 servings of fruits/vegetables daily  Exercise recommendations include exercising 3-5 times per week

## 2021-10-12 ENCOUNTER — RA CDI HCC (OUTPATIENT)
Dept: OTHER | Facility: HOSPITAL | Age: 21
End: 2021-10-12

## 2021-10-13 ENCOUNTER — TELEMEDICINE (OUTPATIENT)
Dept: FAMILY MEDICINE CLINIC | Facility: CLINIC | Age: 21
End: 2021-10-13
Payer: COMMERCIAL

## 2021-10-13 VITALS — WEIGHT: 236 LBS | BODY MASS INDEX: 34.96 KG/M2 | HEIGHT: 69 IN

## 2021-10-13 DIAGNOSIS — J06.9 UPPER RESPIRATORY TRACT INFECTION, UNSPECIFIED TYPE: ICD-10-CM

## 2021-10-13 DIAGNOSIS — B34.9 VIRAL INFECTION, UNSPECIFIED: Primary | ICD-10-CM

## 2021-10-13 PROCEDURE — 3725F SCREEN DEPRESSION PERFORMED: CPT | Performed by: PHYSICIAN ASSISTANT

## 2021-10-13 PROCEDURE — 3008F BODY MASS INDEX DOCD: CPT | Performed by: PHYSICIAN ASSISTANT

## 2021-10-13 PROCEDURE — 99213 OFFICE O/P EST LOW 20 MIN: CPT | Performed by: PHYSICIAN ASSISTANT

## 2021-10-13 RX ORDER — AZITHROMYCIN 250 MG/1
TABLET, FILM COATED ORAL
Qty: 6 TABLET | Refills: 0 | Status: SHIPPED | OUTPATIENT
Start: 2021-10-13 | End: 2021-10-17

## 2021-11-09 ENCOUNTER — TELEMEDICINE (OUTPATIENT)
Dept: FAMILY MEDICINE CLINIC | Facility: CLINIC | Age: 21
End: 2021-11-09
Payer: COMMERCIAL

## 2021-11-09 VITALS — WEIGHT: 236 LBS | HEIGHT: 69 IN | BODY MASS INDEX: 34.96 KG/M2

## 2021-11-09 DIAGNOSIS — J02.0 STREP PHARYNGITIS: Primary | ICD-10-CM

## 2021-11-09 PROCEDURE — 3008F BODY MASS INDEX DOCD: CPT | Performed by: PHYSICIAN ASSISTANT

## 2021-11-09 PROCEDURE — 99213 OFFICE O/P EST LOW 20 MIN: CPT | Performed by: PHYSICIAN ASSISTANT

## 2021-11-09 RX ORDER — AMOXICILLIN 500 MG/1
500 CAPSULE ORAL EVERY 8 HOURS SCHEDULED
Qty: 30 CAPSULE | Refills: 0 | Status: SHIPPED | OUTPATIENT
Start: 2021-11-09 | End: 2021-11-19

## 2021-11-23 DIAGNOSIS — F41.1 GENERALIZED ANXIETY DISORDER: ICD-10-CM

## 2021-11-23 RX ORDER — SERTRALINE HYDROCHLORIDE 25 MG/1
25 TABLET, FILM COATED ORAL DAILY
Qty: 30 TABLET | Refills: 0 | Status: SHIPPED | OUTPATIENT
Start: 2021-11-23 | End: 2022-03-04 | Stop reason: SDUPTHER

## 2021-12-08 ENCOUNTER — TELEPHONE (OUTPATIENT)
Dept: FAMILY MEDICINE CLINIC | Facility: CLINIC | Age: 21
End: 2021-12-08

## 2021-12-08 DIAGNOSIS — F41.1 GENERALIZED ANXIETY DISORDER: Primary | ICD-10-CM

## 2021-12-08 DIAGNOSIS — F41.0 PANIC DISORDER: ICD-10-CM

## 2022-03-04 ENCOUNTER — OFFICE VISIT (OUTPATIENT)
Dept: PSYCHIATRY | Facility: CLINIC | Age: 22
End: 2022-03-04
Payer: COMMERCIAL

## 2022-03-04 DIAGNOSIS — G47.00 INSOMNIA, UNSPECIFIED TYPE: ICD-10-CM

## 2022-03-04 DIAGNOSIS — F41.1 GENERALIZED ANXIETY DISORDER: Primary | ICD-10-CM

## 2022-03-04 DIAGNOSIS — F41.0 PANIC DISORDER: ICD-10-CM

## 2022-03-04 PROCEDURE — 90792 PSYCH DIAG EVAL W/MED SRVCS: CPT

## 2022-03-04 RX ORDER — QUETIAPINE FUMARATE 25 MG/1
25 TABLET, FILM COATED ORAL
Qty: 30 TABLET | Refills: 1 | Status: SHIPPED | OUTPATIENT
Start: 2022-03-04 | End: 2022-05-13 | Stop reason: SDUPTHER
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
## 2022-03-04 NOTE — BH TREATMENT PLAN
TREATMENT PLAN (Medication Management Only)        [facility]    Name and Date of Birth:  Micha Diaz 24 y o  2000  Date of Treatment Plan: March 4, 2022  Diagnosis/Diagnoses:    1  Generalized anxiety disorder    2  Panic disorder    3  Insomnia, unspecified type      Strengths/Personal Resources for Self-Care: supportive friends  Area/Areas of need (in own words): anxiety, depression  1  Long Term Goal: alleviate acceptable anxiety level  Target Date:6 months - 9/4/2022  Person/Persons responsible for completion of goal: Corey Ribeiro  Short Term Objective (s) - How will we reach this goal?:   A  Provider new recommended medication/dosage changes and/or continue medication(s): increase zoloft to 50mg daily and begin seroquel 25  B  Attend medication management appointments regularly  C  Take psychiatric medications responsibly  Target Date:6 months - 9/4/2022  Person/Persons Responsible for Completion of Goal: Joseph Wilson and warner hale  Progress Towards Goals: starting treatment  Treatment Modality: medication management every 4 weeks  Review due 180 days from date of this plan: 6 months - 9/4/2022  Expected length of service: maintenance  My Physician/PA/NP and I have developed this plan together and I agree to work on the goals and objectives  I understand the treatment goals that were developed for my treatment

## 2022-03-30 ENCOUNTER — OFFICE VISIT (OUTPATIENT)
Dept: PSYCHIATRY | Facility: CLINIC | Age: 22
End: 2022-03-30
Payer: COMMERCIAL

## 2022-03-30 DIAGNOSIS — F41.1 GENERALIZED ANXIETY DISORDER: Primary | ICD-10-CM

## 2022-03-30 DIAGNOSIS — G47.00 INSOMNIA, UNSPECIFIED TYPE: ICD-10-CM

## 2022-03-30 DIAGNOSIS — F41.0 PANIC DISORDER: ICD-10-CM

## 2022-03-30 PROCEDURE — 99213 OFFICE O/P EST LOW 20 MIN: CPT

## 2022-03-30 RX ORDER — SERTRALINE HYDROCHLORIDE 100 MG/1
100 TABLET, FILM COATED ORAL DAILY
Qty: 30 TABLET | Refills: 1 | Status: SHIPPED | OUTPATIENT
Start: 2022-03-30 | End: 2022-05-31 | Stop reason: SDUPTHER

## 2022-03-30 RX ORDER — HYDROXYZINE PAMOATE 50 MG/1
50 CAPSULE ORAL 3 TIMES DAILY PRN
COMMUNITY
Start: 2022-03-27 | End: 2022-03-30 | Stop reason: SDUPTHER

## 2022-03-30 RX ORDER — HYDROXYZINE PAMOATE 50 MG/1
50 CAPSULE ORAL 3 TIMES DAILY PRN
COMMUNITY
Start: 2022-03-28 | End: 2022-03-30 | Stop reason: ALTCHOICE

## 2022-03-30 RX ORDER — HYDROXYZINE PAMOATE 50 MG/1
50 CAPSULE ORAL 3 TIMES DAILY PRN
Qty: 90 CAPSULE | Refills: 0 | Status: SHIPPED | OUTPATIENT
Start: 2022-03-30 | End: 2022-04-09

## 2022-03-30 NOTE — PSYCH
Regular Visit    Problem List Items Addressed This Visit        Other    Generalized anxiety disorder - Primary    Relevant Medications    sertraline (ZOLOFT) 100 mg tablet    hydrOXYzine pamoate (VISTARIL) 50 mg capsule    Panic disorder    Relevant Medications    sertraline (ZOLOFT) 100 mg tablet    hydrOXYzine pamoate (VISTARIL) 50 mg capsule      Other Visit Diagnoses     Insomnia, unspecified type        Relevant Medications    hydrOXYzine pamoate (VISTARIL) 50 mg capsule             Encounter provider HOLGER Osborn    Provider located at   [address] [address] [phone]    Recent Visits  No visits were found meeting these conditions  Showing recent visits within past 7 days and meeting all other requirements  Today's Visits  Date Type Provider Dept   03/30/22 Office Visit HOLGER Osborn  [facility]   Showing today's visits and meeting all other requirements  Future Appointments  No visits were found meeting these conditions    Showing future appointments within next 150 days and meeting all other requirements       HPI     Current Outpatient Medications   Medication Sig Dispense Refill    hydrOXYzine pamoate (VISTARIL) 50 mg capsule Take 1 capsule (50 mg total) by mouth 3 (three) times a day as needed for anxiety for up to 10 days 90 capsule 0    acetaminophen (TYLENOL) 325 mg tablet Take 650 mg by mouth every 6 (six) hours as needed for mild pain      famotidine (PEPCID) 20 mg tablet Take 1 tablet (20 mg total) by mouth 2 (two) times a day for 7 days (Patient not taking: Reported on 9/22/2020) 14 tablet 0    fluticasone (FLONASE) 50 mcg/act nasal spray 1 spray into each nostril daily 9 9 mL 0    omeprazole (PriLOSEC) 10 mg delayed release capsule Take 10 mg by mouth daily      QUEtiapine (SEROquel) 25 mg tablet Take 1 tablet (25 mg total) by mouth daily at bedtime 30 tablet 1    sertraline (ZOLOFT) 100 mg tablet Take 1 tablet (100 mg total) by mouth daily 30 tablet 1     No current facility-administered medications for this visit  Review of Systems        MEDICATION MANAGEMENT NOTE        [address] [place] [address]    Name and Date of Birth:  Flory Giron 24 y o  2000 MRN: [id number]    Date of Visit: March 30, 2022    No Known Allergies  SUBJECTIVE:    Myles Tom is seen today for a follow up for Generalized Anxiety Disorder, Panic Disorder and insomnia  He continues to experience on and off symptoms since the last visit  Patient reports the initiation of Seroquel has been extremely helpful for his insomnia symptoms, declines any need for increased at this time  Still experiences anxiety and depression symptoms, reports a decrease of anxiety from 08/10 to 5/10 with the recent increase in Zoloft  Reports 1 panic attack which occurred 3 days ago while he was driving the car, anxiety was so severe patient blacked out and was taken to the emergency department  Patient denies feeling anxious or stressed out when the anxiety attack occurred  States he was having a great day up to that point,he cannot identify any stressors causing the panic attack  Patient had a full medical workup and all results were benign, ER attributes it to severe anxiety  He was started on hydroxyzine 25 mg as needed for severe anxiety and patient states medication has been very helpful so far  Zoloft increased to 100 mg daily at today's visit and patient agreed to plan  He denies all side effects from psychiatric medications  Denies suicidal ideation  He remains on therapy wait list     He denies any side effects from medications        PLAN:    Increase Zoloft to 100mg daily  Continue prn Atarax 50mg daily   Continue Seroquel 25mg daily at  for insomnia    Aware of 24 hour and weekend coverage for urgent situations accessed by calling [facility] main practice number  Referral for individual psychotherapy    Diagnoses and all orders for this visit:    Generalized anxiety disorder  -     sertraline (ZOLOFT) 100 mg tablet; Take 1 tablet (100 mg total) by mouth daily  -     hydrOXYzine pamoate (VISTARIL) 50 mg capsule; Take 1 capsule (50 mg total) by mouth 3 (three) times a day as needed for anxiety for up to 10 days    Panic disorder  -     hydrOXYzine pamoate (VISTARIL) 50 mg capsule; Take 1 capsule (50 mg total) by mouth 3 (three) times a day as needed for anxiety for up to 10 days    Insomnia, unspecified type  -     hydrOXYzine pamoate (VISTARIL) 50 mg capsule; Take 1 capsule (50 mg total) by mouth 3 (three) times a day as needed for anxiety for up to 10 days    Other orders  -     Discontinue: hydrOXYzine pamoate (VISTARIL) 50 mg capsule; Take 50 mg by mouth Three times daily as needed  -     Discontinue: hydrOXYzine pamoate (VISTARIL) 50 mg capsule;  Take 50 mg by mouth 3 (three) times a day as needed        Current Outpatient Medications on File Prior to Visit   Medication Sig Dispense Refill    [DISCONTINUED] hydrOXYzine pamoate (VISTARIL) 50 mg capsule Take 50 mg by mouth Three times daily as needed      acetaminophen (TYLENOL) 325 mg tablet Take 650 mg by mouth every 6 (six) hours as needed for mild pain      famotidine (PEPCID) 20 mg tablet Take 1 tablet (20 mg total) by mouth 2 (two) times a day for 7 days (Patient not taking: Reported on 9/22/2020) 14 tablet 0    fluticasone (FLONASE) 50 mcg/act nasal spray 1 spray into each nostril daily 9 9 mL 0    omeprazole (PriLOSEC) 10 mg delayed release capsule Take 10 mg by mouth daily      QUEtiapine (SEROquel) 25 mg tablet Take 1 tablet (25 mg total) by mouth daily at bedtime 30 tablet 1    [DISCONTINUED] hydrOXYzine pamoate (VISTARIL) 50 mg capsule Take 50 mg by mouth 3 (three) times a day as needed      [DISCONTINUED] sertraline (ZOLOFT) 50 mg tablet Take 1 tablet (50 mg total) by mouth daily 30 tablet 1     No current facility-administered medications on file prior to visit  HPI ROS Appetite Changes and Sleep:     He reports much improved sleep since seroquel, adequate appetite, adequate energy level   Denies homicidal ideation, denies suicidal ideation    Review Of Systems:      HPI ROS:               Medication Side Effects:  denies    Depression (10 worst): 8/10    Anxiety (10 worst): 5/10    Safety concerns (SI, HI, etc): Denies si and hi    Sleep: Much improved    Energy: fair    Appetite: fair    Weight Change: denies        Mental Status Evaluation:    Appearance Adequate hygiene and grooming   Behavior calm and cooperative   Mood anxious and depressed  Depression Scale - 8 of 10 (0 = No depression)  Anxiety Scale - 5 of 10 (0 = No anxiety)   Speech Normal rate and volume   Affect appropriate   Thought Processes Goal directed and coherent   Thought Content Does not verbalize delusional material   Associations Tightly connected   Perceptual Disturbances Denies hallucinations and does not appear to be responding to internal stimuli   Risk Potential Suicidal/Homicidal Ideation - No evidence of suicidal or homicidal ideation and patient does not verbalize suicidal or homicidal ideation  Risk of Violence - No evidence of risk for violence found on assessment  Risk of Self Mutilation - No evidence of risk for self mutilation found on assessment   Orientation oriented to person, place, time/date and situation   Memory recent and remote memory grossly intact   Consciousness alert and awake   Attention/Concentration attention span and concentration are age appropriate   Insight intact   Judgement intact   Muscle Strength and Gait normal muscle strength and normal muscle tone, normal gait/station and normal balance   Motor Activity no abnormal movements   Language no difficulty naming common objects, no difficulty repeating a phrase, no difficulty writing a sentence   Fund of Knowledge adequate knowledge of current events  adequate fund of knowledge regarding past history  adequate fund of knowledge regarding vocabulary      Past Psychiatric History Update:     Inpatient Psychiatric Admission Since Last Encounter:   no  Changes to Outpatient Psychiatric Treatment Team:    no  Suicide Attempt Or Self Mutilation Since Last Encounter:   no  Incidence of Violent Behavior Since Last Encounter:   no    Traumatic History Update:     New Onset of Abuse Since Last Encounter:   no  Traumatic Events Since Last Encounter:   no    Past Medical History:    No past medical history on file  No past medical history pertinent negatives  No past surgical history on file    No Known Allergies  Substance Abuse History:    Social History     Substance and Sexual Activity   Alcohol Use Not Currently     Social History     Substance and Sexual Activity   Drug Use Not Currently     Social History:    Social History     Socioeconomic History    Marital status: Single     Spouse name: Not on file    Number of children: Not on file    Years of education: Not on file    Highest education level: Not on file   Occupational History    Not on file   Tobacco Use    Smoking status: Current Some Day Smoker     Years: 3 00    Smokeless tobacco: Never Used    Tobacco comment: once monthy   Vaping Use    Vaping Use: Former   Substance and Sexual Activity    Alcohol use: Not Currently    Drug use: Not Currently    Sexual activity: Not on file   Other Topics Concern    Not on file   Social History Narrative    Not on file     Social Determinants of Health     Financial Resource Strain: Not on file   Food Insecurity: Not on file   Transportation Needs: Not on file   Physical Activity: Not on file   Stress: Not on file   Social Connections: Not on file   Intimate Partner Violence: Not on file   Housing Stability: Not on file     Family Psychiatric History:     Family History   Problem Relation Age of Onset    No Known Problems Mother     No Known Problems Father      History Review: The following portions of the patient's history were reviewed and updated as appropriate: allergies, current medications, past family history, past medical history, past social history, past surgical history and problem list     OBJECTIVE:     Vital signs in last 24 hours: There were no vitals filed for this visit  Laboratory Results:   Recent Labs (last 2 months):   No visits with results within 2 Month(s) from this visit     Latest known visit with results is:   Admission on 09/19/2020, Discharged on 09/19/2020   Component Date Value    WBC 09/19/2020 6 48     RBC 09/19/2020 4 68     Hemoglobin 09/19/2020 14 6     Hematocrit 09/19/2020 42 3     MCV 09/19/2020 90     MCH 09/19/2020 31 2     MCHC 09/19/2020 34 5     RDW 09/19/2020 12 1     MPV 09/19/2020 10 1     Platelets 03/49/9329 255     nRBC 09/19/2020 0     Neutrophils Relative 09/19/2020 46     Immat GRANS % 09/19/2020 0     Lymphocytes Relative 09/19/2020 36     Monocytes Relative 09/19/2020 15*    Eosinophils Relative 09/19/2020 2     Basophils Relative 09/19/2020 1     Neutrophils Absolute 09/19/2020 3 00     Immature Grans Absolute 09/19/2020 0 01     Lymphocytes Absolute 09/19/2020 2 35     Monocytes Absolute 09/19/2020 0 94     Eosinophils Absolute 09/19/2020 0 12     Basophils Absolute 09/19/2020 0 06     Sodium 09/19/2020 139     Potassium 09/19/2020 4 2     Chloride 09/19/2020 103     CO2 09/19/2020 30     ANION GAP 09/19/2020 6     BUN 09/19/2020 12     Creatinine 09/19/2020 0 85     Glucose 09/19/2020 92     Calcium 09/19/2020 9 2     AST 09/19/2020 37     ALT 09/19/2020 58     Alkaline Phosphatase 09/19/2020 104     Total Protein 09/19/2020 7 7     Albumin 09/19/2020 4 0     Total Bilirubin 09/19/2020 0 50     eGFR 09/19/2020 125     Lipase 09/19/2020 72*    Color, UA 09/19/2020 Yellow     Clarity, UA 09/19/2020 Clear     Specific [place], UA 09/19/2020 1 010     pH, UA 09/19/2020 6 5     Leukocytes, UA 09/19/2020 Negative     Nitrite, UA 09/19/2020 Negative     Protein, UA 09/19/2020 Negative     Glucose, UA 09/19/2020 Negative     Ketones, UA 09/19/2020 Negative     Urobilinogen, UA 09/19/2020 0 2     Bilirubin, UA 09/19/2020 Negative     Blood, UA 09/19/2020 Negative      I have personally reviewed all pertinent laboratory/tests results  Suicide/Homicide Risk Assessment:    Risk of Harm to Self:  Protective Factors: no current suicidal ideation, access to mental health treatment, compliant with medications, compliant with mental health treatment  Based on today's assessment, Saji Lara presents the following risk of harm to self: minimal    Risk of Harm to Others:  Based on today's assessment, Saji Lara presents the following risk of harm to others: minimal    The following interventions are recommended: contracts for safety at present - agrees to go to ED if feeling unsafe    Medications Risks/Benefits:      Risks, Benefits And Possible Side Effects Of Medications:    Discussed risks and benefits of treatment with patient including risk of suicidality, serotonin syndrome, increased QTc interval and SIADH related to treatment with antidepressants; Risk of induction of manic symptoms in certain patient populations and risk of parkinsonian symptoms, metabolic syndrome, tardive dyskinesia and neuroleptic malignant syndrome related to treatment with antipsychotic medications     Controlled Medication Discussion:     Not applicable    Treatment Plan:    Due for update/Updated:   no  Last treatment plan done 3/4/22 by warner hale  Treatment Plan due on 9/4/22      HOLGER Kurtz 03/30/22

## 2022-05-12 ENCOUNTER — TELEMEDICINE (OUTPATIENT)
Dept: FAMILY MEDICINE CLINIC | Facility: CLINIC | Age: 22
End: 2022-05-12
Payer: COMMERCIAL

## 2022-05-12 VITALS — WEIGHT: 236 LBS | HEIGHT: 69 IN | BODY MASS INDEX: 34.96 KG/M2

## 2022-05-12 DIAGNOSIS — U07.1 COVID-19: Primary | ICD-10-CM

## 2022-05-12 PROCEDURE — 1036F TOBACCO NON-USER: CPT | Performed by: PHYSICIAN ASSISTANT

## 2022-05-12 PROCEDURE — 3008F BODY MASS INDEX DOCD: CPT | Performed by: PHYSICIAN ASSISTANT

## 2022-05-12 PROCEDURE — 99213 OFFICE O/P EST LOW 20 MIN: CPT | Performed by: PHYSICIAN ASSISTANT

## 2022-05-12 PROCEDURE — 3725F SCREEN DEPRESSION PERFORMED: CPT | Performed by: PHYSICIAN ASSISTANT

## 2022-05-12 NOTE — PROGRESS NOTES
COVID-19 Outpatient Progress Note    Assessment/Plan:    Problem List Items Addressed This Visit    None     Visit Diagnoses     COVID-19    -  Primary         Disposition:     Patient has COVID-19 infection  Based off CDC guidelines, they were recommended to isolate for 5 days from the date of the positive test  If they remain asymptomatic, isolation may be ended followed by 5 days of wearing a mask when around othes to minimize risk of infecting others  If they have a fever, continue to stay home until fever resolves for at least 24 hours  Discussed symptom directed medication options with patient  Discussed vitamin D, vitamin C, and/or zinc supplementation with patient  I have spent 15 minutes directly with the patient  Greater than 50% of this time was spent in counseling/coordination of care regarding: instructions for management, patient and family education and importance of treatment compliance  Encounter provider Haylee Parks PA-C    Provider located at [place] [address]  [address] [address]    Recent Visits  No visits were found meeting these conditions  Showing recent visits within past 7 days and meeting all other requirements  Today's Visits  Date Type Provider Dept   05/12/22 Telemedicine Haylee Parks PA-C [address] [facility]   Showing today's visits and meeting all other requirements  Future Appointments  No visits were found meeting these conditions  Showing future appointments within next 150 days and meeting all other requirements     This virtual check-in was done via [employer] and patient was informed that this is a secure, HIPAA-compliant platform  He agrees to proceed  Patient agrees to participate in a virtual check in via telephone or video visit instead of presenting to the office to address urgent/immediate medical needs  Patient is aware this is a billable service      After connecting through [facility], the patient was identified by name and date of birth  Karlo Kennedy was informed that this was a telemedicine visit and that the exam was being conducted confidentially over secure lines  My office door was closed  No one else was in the room  Karlo Kennedy acknowledged consent and understanding of privacy and security of the telemedicine visit  I informed the patient that I have reviewed his record in Epic and presented the opportunity for him to ask any questions regarding the visit today  The patient agreed to participate  Verification of patient location:  Patient is located in the following state in which I hold an active license: [place]    Subjective:   Karlo Kennedy is a 25 y o  male who has been screened for COVID-19  Symptom change since last report: improving  Patient's symptoms include fatigue, nasal congestion, rhinorrhea, sore throat, cough, diarrhea and headache  Patient denies fever, chills, anosmia, loss of taste, shortness of breath, chest tightness, abdominal pain, nausea, vomiting and myalgias  - Date of symptom onset: 5/10/2022  - Date of exposure: 5/9/2022  - Date of positive COVID-19 test: 5/12/2022  Type of test: Home antigen  Patient with typical symptoms of COVID-19 and they attest that they were positive on home rapid antigen testing  Image of positive result is not able to be uploaded into their chart  COVID-19 vaccination status: Fully vaccinated (primary series)    Zoila Mcmullen has been staying home and has isolated themselves in his home  He is taking care to not share personal items and is cleaning all surfaces that are touched often, like counters, tabletops, and doorknobs using household cleaning sprays or wipes  He is wearing a mask when he leaves his room  Zoila Mcmullen is a pleasant 25year old male who is here today for a follow-up after testing positive for COVID with a home test  He is complaining of cough, runny nose, nasal congestion, sore throat, diarrhea, headache, and fatigue   He denies any fevers, chills, chest pains, or shortness of breath  He is eating and drinking without difficulty  He has been taking tylenol and DayQuil as needed  Lab Results   Component Value Date    SARSCOV2 Not Detected 09/14/2020     History reviewed  No pertinent past medical history  History reviewed  No pertinent surgical history  Current Outpatient Medications   Medication Sig Dispense Refill    fluticasone (FLONASE) 50 mcg/act nasal spray 1 spray into each nostril daily 9 9 mL 0    omeprazole (PriLOSEC) 10 mg delayed release capsule Take 10 mg by mouth daily      QUEtiapine (SEROquel) 25 mg tablet Take 1 tablet (25 mg total) by mouth daily at bedtime 30 tablet 1    sertraline (ZOLOFT) 100 mg tablet Take 1 tablet (100 mg total) by mouth daily 30 tablet 1    acetaminophen (TYLENOL) 325 mg tablet Take 650 mg by mouth every 6 (six) hours as needed for mild pain (Patient not taking: Reported on 5/12/2022)      famotidine (PEPCID) 20 mg tablet Take 1 tablet (20 mg total) by mouth 2 (two) times a day for 7 days (Patient not taking: Reported on 9/22/2020) 14 tablet 0    hydrOXYzine pamoate (VISTARIL) 50 mg capsule Take 1 capsule (50 mg total) by mouth 3 (three) times a day as needed for anxiety for up to 10 days 90 capsule 0     No current facility-administered medications for this visit  No Known Allergies    Review of Systems   Constitutional: Positive for fatigue  Negative for chills, diaphoresis and fever  HENT: Positive for congestion, rhinorrhea and sore throat  Negative for ear pain, postnasal drip, sneezing and trouble swallowing  Eyes: Negative for pain and visual disturbance  Respiratory: Positive for cough  Negative for apnea, chest tightness, shortness of breath and wheezing  Cardiovascular: Negative for chest pain and palpitations  Gastrointestinal: Positive for diarrhea  Negative for abdominal pain, constipation, nausea and vomiting     Genitourinary: Negative for dysuria and hematuria  Musculoskeletal: Negative for arthralgias, gait problem and myalgias  Neurological: Positive for headaches  Negative for dizziness, syncope, weakness, light-headedness and numbness  Psychiatric/Behavioral: Negative for suicidal ideas  The patient is not nervous/anxious  Objective:    Vitals:    05/12/22 1433   Weight: 107 kg (236 lb)   Height: 5' 9" (1 753 m)       Physical Exam  Vitals and nursing note reviewed  Constitutional:       General: He is not in acute distress  Appearance: He is well-developed  He is not ill-appearing, toxic-appearing or diaphoretic  HENT:      Head: Normocephalic and atraumatic  Pulmonary:      Effort: Pulmonary effort is normal  No respiratory distress (Patient is speaking in full, fluent sentences  He does not appear in any acute distress)  Neurological:      Mental Status: He is alert  Psychiatric:         Behavior: Behavior normal  Behavior is cooperative  Thought Content: Thought content normal          Judgment: Judgment normal          VIRTUAL VISIT DISCLAIMER    Orlinda Angelucci verbally agrees to participate in [employer]  Pt is aware that [employer] could be limited without vital signs or the ability to perform a full hands-on physical exam  Corey Parekr understands he or the provider may request at any time to terminate the video visit and request the patient to seek care or treatment in person

## 2022-05-13 DIAGNOSIS — F41.1 GENERALIZED ANXIETY DISORDER: ICD-10-CM

## 2022-05-13 DIAGNOSIS — G47.00 INSOMNIA, UNSPECIFIED TYPE: ICD-10-CM

## 2022-05-13 RX ORDER — QUETIAPINE FUMARATE 25 MG/1
25 TABLET, FILM COATED ORAL
Qty: 30 TABLET | Refills: 1 | Status: SHIPPED | OUTPATIENT
Start: 2022-05-13 | End: 2022-05-31

## 2022-05-31 ENCOUNTER — OFFICE VISIT (OUTPATIENT)
Dept: PSYCHIATRY | Facility: CLINIC | Age: 22
End: 2022-05-31
Payer: COMMERCIAL

## 2022-05-31 DIAGNOSIS — F41.0 PANIC DISORDER: ICD-10-CM

## 2022-05-31 DIAGNOSIS — F41.1 GENERALIZED ANXIETY DISORDER: Primary | ICD-10-CM

## 2022-05-31 DIAGNOSIS — G47.00 INSOMNIA, UNSPECIFIED TYPE: ICD-10-CM

## 2022-05-31 PROCEDURE — 99213 OFFICE O/P EST LOW 20 MIN: CPT

## 2022-05-31 RX ORDER — QUETIAPINE FUMARATE 50 MG/1
50 TABLET, FILM COATED ORAL
Qty: 30 TABLET | Refills: 1 | Status: SHIPPED | OUTPATIENT
Start: 2022-05-31

## 2022-05-31 RX ORDER — SERTRALINE HYDROCHLORIDE 100 MG/1
100 TABLET, FILM COATED ORAL DAILY
Qty: 30 TABLET | Refills: 1 | Status: SHIPPED | OUTPATIENT
Start: 2022-05-31

## 2022-05-31 NOTE — PSYCH
Regular Visit    Problem List Items Addressed This Visit        Other    Generalized anxiety disorder - Primary    Relevant Medications    QUEtiapine (SEROquel) 50 mg tablet    sertraline (ZOLOFT) 100 mg tablet    Panic disorder    Relevant Medications    QUEtiapine (SEROquel) 50 mg tablet    sertraline (ZOLOFT) 100 mg tablet      Other Visit Diagnoses     Insomnia, unspecified type        Relevant Medications    QUEtiapine (SEROquel) 50 mg tablet             Encounter provider HOLGER Estrella    Provider located at   [address] [address] [phone]    Recent Visits  No visits were found meeting these conditions  Showing recent visits within past 7 days and meeting all other requirements  Today's Visits  Date Type Provider Dept   05/31/22 Office Visit HOLGER Estrella  [facility]   Showing today's visits and meeting all other requirements  Future Appointments  No visits were found meeting these conditions    Showing future appointments within next 150 days and meeting all other requirements       HPI     Current Outpatient Medications   Medication Sig Dispense Refill    QUEtiapine (SEROquel) 50 mg tablet Take 1 tablet (50 mg total) by mouth daily at bedtime 30 tablet 1    sertraline (ZOLOFT) 100 mg tablet Take 1 tablet (100 mg total) by mouth daily 30 tablet 1    acetaminophen (TYLENOL) 325 mg tablet Take 650 mg by mouth every 6 (six) hours as needed for mild pain (Patient not taking: Reported on 5/12/2022)      famotidine (PEPCID) 20 mg tablet Take 1 tablet (20 mg total) by mouth 2 (two) times a day for 7 days (Patient not taking: Reported on 9/22/2020) 14 tablet 0    fluticasone (FLONASE) 50 mcg/act nasal spray 1 spray into each nostril daily 9 9 mL 0    hydrOXYzine pamoate (VISTARIL) 50 mg capsule Take 1 capsule (50 mg total) by mouth 3 (three) times a day as needed for anxiety for up to 10 days 90 capsule 0    omeprazole (PriLOSEC) 10 mg delayed release capsule Take 10 mg by mouth daily       No current facility-administered medications for this visit  Review of Systems        MEDICATION MANAGEMENT NOTE        [address] [place] [address]    Name and Date of Birth:  Nicole Sherman y o  2000 MRN: [id number]    Date of Visit: May 31, 2022    No Known Allergies  SUBJECTIVE:    Gael Santana is seen today for a follow up for Major Depressive Disorder and Generalized Anxiety Disorder  He continues to do relatively well since the last visit  Reports continued improvement in terms of depression + anxiety with current medication regimen of Zoloft, Seroquel, Atarax  Previously experienced severe panic attacks which led to a prior ED visit back in March 20, 2022  Currently is no longer experiencing severe panic attacks and reports possibly 1 anxiety attack in the past month and a half which he attributes to having an argument with his friend  Feels his depression has improved significantly and rates it 3/10 with minimal and manageable symptoms  Reports anxiety continues to come and go but no longer gets to the extreme levels as it previously did  Uses p r n  Atarax 50 mg for breakthrough anxiety which is effective  Insomnia has improved with Seroquel 25 mg daily and he requested an increase to 50 mg daily to further improve his sleep  Denies side effects from psychiatric medications  He was happy to share he got a new job as a  and really enjoys it  Continues to get along well with his girlfriend  Denies any further new stressors  Remains on therapy wait list is looking for to beginning  Denies thoughts of self-harm or suicidal ideation  He denies any side effects from medications      PLAN:    Continue Zoloft to 100mg daily  Continue prn Atarax 50mg daily   Increase Seroquel to 50mg daily at hs for insomnia, sleeping about 6 hours per night        Aware of 24 hour and weekend coverage for urgent situations accessed by calling [facility] main practice number  Referral for individual psychotherapy    Diagnoses and all orders for this visit:    Generalized anxiety disorder  -     QUEtiapine (SEROquel) 50 mg tablet; Take 1 tablet (50 mg total) by mouth daily at bedtime  -     sertraline (ZOLOFT) 100 mg tablet; Take 1 tablet (100 mg total) by mouth daily    Panic disorder    Insomnia, unspecified type  -     QUEtiapine (SEROquel) 50 mg tablet; Take 1 tablet (50 mg total) by mouth daily at bedtime        Current Outpatient Medications on File Prior to Visit   Medication Sig Dispense Refill    acetaminophen (TYLENOL) 325 mg tablet Take 650 mg by mouth every 6 (six) hours as needed for mild pain (Patient not taking: Reported on 5/12/2022)      famotidine (PEPCID) 20 mg tablet Take 1 tablet (20 mg total) by mouth 2 (two) times a day for 7 days (Patient not taking: Reported on 9/22/2020) 14 tablet 0    fluticasone (FLONASE) 50 mcg/act nasal spray 1 spray into each nostril daily 9 9 mL 0    hydrOXYzine pamoate (VISTARIL) 50 mg capsule Take 1 capsule (50 mg total) by mouth 3 (three) times a day as needed for anxiety for up to 10 days 90 capsule 0    omeprazole (PriLOSEC) 10 mg delayed release capsule Take 10 mg by mouth daily      [DISCONTINUED] QUEtiapine (SEROquel) 25 mg tablet Take 1 tablet (25 mg total) by mouth daily at bedtime 30 tablet 1    [DISCONTINUED] sertraline (ZOLOFT) 100 mg tablet Take 1 tablet (100 mg total) by mouth daily 30 tablet 1     No current facility-administered medications on file prior to visit  HPI ROS Appetite Changes and Sleep:     He reports difficulty falling asleep, adequate appetite, adequate energy level   Denies homicidal ideation, denies suicidal ideation    Review Of Systems:      HPI ROS:               Medication Side Effects:  denies    Depression (10 worst): 3/10 Anxiety (10 worst): 6/10    Safety concerns (SI, HI, etc): Denies si and hi    Sleep: Improved with Seroquel and dosage increased today    Energy: fair    Appetite: good    Weight Change: denies        Mental Status Evaluation:    Appearance Adequate hygiene and grooming   Behavior calm and cooperative   Mood depressed  Depression Scale - 3 of 10 (0 = No depression)  Anxiety Scale - 6 of 10 (0 = No anxiety)   Speech Normal rate and volume   Affect appropriate   Thought Processes Goal directed and coherent   Thought Content Does not verbalize delusional material   Associations Tightly connected   Perceptual Disturbances Denies hallucinations and does not appear to be responding to internal stimuli   Risk Potential Suicidal/Homicidal Ideation - No evidence of suicidal or homicidal ideation and patient does not verbalize suicidal or homicidal ideation  Risk of Violence - No evidence of risk for violence found on assessment  Risk of Self Mutilation - No evidence of risk for self mutilation found on assessment   Orientation oriented to person, place, time/date and situation   Memory recent and remote memory grossly intact   Consciousness alert and awake   Attention/Concentration attention span and concentration are age appropriate   Insight intact   Judgement intact   Muscle Strength and Gait normal muscle strength and normal muscle tone, normal gait/station and normal balance   Motor Activity no abnormal movements   Language no difficulty naming common objects, no difficulty repeating a phrase, no difficulty writing a sentence   Fund of Knowledge adequate knowledge of current events  adequate fund of knowledge regarding past history  adequate fund of knowledge regarding vocabulary      Past Psychiatric History Update:     Inpatient Psychiatric Admission Since Last Encounter:   no  Changes to Outpatient Psychiatric Treatment Team:    no  Suicide Attempt Or Self Mutilation Since Last Encounter:   no  Incidence of Violent Behavior Since Last Encounter:   no    Traumatic History Update:     New Onset of Abuse Since Last Encounter:   no  Traumatic Events Since Last Encounter:   no    Past Medical History:    History reviewed  No pertinent past medical history  No past medical history pertinent negatives  History reviewed  No pertinent surgical history  No Known Allergies  Substance Abuse History:    Social History     Substance and Sexual Activity   Alcohol Use Not Currently     Social History     Substance and Sexual Activity   Drug Use Not Currently     Social History:    Social History     Socioeconomic History    Marital status: Single     Spouse name: Not on file    Number of children: Not on file    Years of education: Not on file    Highest education level: Not on file   Occupational History    Not on file   Tobacco Use    Smoking status: Former Smoker     Years: 3 00    Smokeless tobacco: Never Used    Tobacco comment: once monthy   Vaping Use    Vaping Use: Former   Substance and Sexual Activity    Alcohol use: Not Currently    Drug use: Not Currently    Sexual activity: Not on file   Other Topics Concern    Not on file   Social History Narrative    Not on file     Social Determinants of Health     Financial Resource Strain: Not on file   Food Insecurity: Not on file   Transportation Needs: Not on file   Physical Activity: Not on file   Stress: Not on file   Social Connections: Not on file   Intimate Partner Violence: Not on file   Housing Stability: Not on file     Family Psychiatric History:     Family History   Problem Relation Age of Onset    No Known Problems Mother     No Known Problems Father      History Review: The following portions of the patient's history were reviewed and updated as appropriate: allergies, current medications, past family history, past medical history, past social history, past surgical history and problem list     OBJECTIVE:     Vital signs in last 24 hours:     There were no vitals filed for this visit  Laboratory Results: I have personally reviewed all pertinent laboratory/tests results  Suicide/Homicide Risk Assessment:    Risk of Harm to Self:  Protective Factors: no current suicidal ideation, access to mental health treatment, compliant with medications, compliant with mental health treatment, connection to community  Based on today's assessment, Joceline Sherwood presents the following risk of harm to self: minimal    Risk of Harm to Others:  Based on today's assessment, Joceline Sherwood presents the following risk of harm to others: minimal    The following interventions are recommended: referral for psychotherapy    Medications Risks/Benefits:      Risks, Benefits And Possible Side Effects Of Medications:    Discussed risks and benefits of treatment with patient including risk of suicidality, serotonin syndrome, increased QTc interval and SIADH related to treatment with antidepressants; Risk of induction of manic symptoms in certain patient populations and risk of parkinsonian symptoms, metabolic syndrome, tardive dyskinesia and neuroleptic malignant syndrome related to treatment with antipsychotic medications     Controlled Medication Discussion:     Not applicable    Treatment Plan:    Due for update/Updated:   no  Last treatment plan done 3/4/22 by [facility]ner [facility]ale  Treatment Plan due on 9/4/22      HOLGER Su 05/31/22

## 2022-08-25 ENCOUNTER — OFFICE VISIT (OUTPATIENT)
Dept: FAMILY MEDICINE CLINIC | Facility: CLINIC | Age: 22
End: 2022-08-25
Payer: COMMERCIAL

## 2022-08-25 VITALS
OXYGEN SATURATION: 96 % | HEIGHT: 69 IN | BODY MASS INDEX: 36.43 KG/M2 | SYSTOLIC BLOOD PRESSURE: 128 MMHG | HEART RATE: 116 BPM | TEMPERATURE: 98.1 F | WEIGHT: 246 LBS | DIASTOLIC BLOOD PRESSURE: 80 MMHG

## 2022-08-25 DIAGNOSIS — H81.13 BENIGN PAROXYSMAL POSITIONAL VERTIGO DUE TO BILATERAL VESTIBULAR DISORDER: Primary | ICD-10-CM

## 2022-08-25 PROCEDURE — 99214 OFFICE O/P EST MOD 30 MIN: CPT | Performed by: PHYSICIAN ASSISTANT

## 2022-08-25 RX ORDER — MECLIZINE HCL 12.5 MG/1
12.5 TABLET ORAL 3 TIMES DAILY PRN
Qty: 30 TABLET | Refills: 0 | Status: SHIPPED | OUTPATIENT
Start: 2022-08-25 | End: 2022-09-09 | Stop reason: SDUPTHER

## 2022-08-25 NOTE — LETTER
August 25, 2022     Patient: Flory Giron  YOB: 2000  Date of Visit: 8/25/2022      To Whom it May Concern:    Flory Giron is under my professional care  Myles Vaishali was seen in my office on 8/25/2022  Myles Tom may return to work on 8/26/2022  If you have any questions or concerns, please don't hesitate to call           Sincerely,          Norberto Montaño PA-C

## 2022-08-25 NOTE — PROGRESS NOTES
Assessment/Plan:    Problem List Items Addressed This Visit    None     Visit Diagnoses     Benign paroxysmal positional vertigo due to bilateral vestibular disorder    -  Primary    Relevant Medications    meclizine (ANTIVERT) 12 5 MG tablet           Diagnoses and all orders for this visit:    Benign paroxysmal positional vertigo due to bilateral vestibular disorder  -     meclizine (ANTIVERT) 12 5 MG tablet; Take 1 tablet (12 5 mg total) by mouth 3 (three) times a day as needed for dizziness or nausea      Script for meclizine   Discussed referral to vestibular therapy, but he deferred at this time  Follow-up if symptoms do not improve or worsen    Subjective:      Patient ID: Robe Washburn is a 25 y o  male  Maryetta Meigs is a 25year old male who is here today complaining of dizziness  It has been on and off for the past 1-2 months, but got worse on Sunday  He noticed it happens if he changes positions quickly or moves his head too fast  He denies any syncopal episodes, medication changes, fevers, chills, chest pains, or shortness of breath  He has not tried anything  He does get associated nausea with the episodes  The following portions of the patient's history were reviewed and updated as appropriate:   He has no past medical history on file  ,  does not have any pertinent problems on file  ,   has no past surgical history on file  ,  family history includes No Known Problems in his father and mother  ,   reports that he has quit smoking  He quit after 3 00 years of use  He has never used smokeless tobacco  He reports previous alcohol use  He reports previous drug use ,  has No Known Allergies     Current Outpatient Medications   Medication Sig Dispense Refill    acetaminophen (TYLENOL) 325 mg tablet Take 650 mg by mouth every 6 (six) hours as needed for mild pain      meclizine (ANTIVERT) 12 5 MG tablet Take 1 tablet (12 5 mg total) by mouth 3 (three) times a day as needed for dizziness or nausea 30 tablet 0    omeprazole (PriLOSEC) 10 mg delayed release capsule Take 10 mg by mouth daily      QUEtiapine (SEROquel) 50 mg tablet Take 1 tablet (50 mg total) by mouth daily at bedtime 30 tablet 1    sertraline (ZOLOFT) 100 mg tablet Take 1 tablet (100 mg total) by mouth daily 30 tablet 1    hydrOXYzine pamoate (VISTARIL) 50 mg capsule Take 1 capsule (50 mg total) by mouth 3 (three) times a day as needed for anxiety for up to 10 days 90 capsule 0     No current facility-administered medications for this visit  Review of Systems   Constitutional: Negative for chills, diaphoresis, fatigue and fever  HENT: Negative for congestion, ear pain, postnasal drip, rhinorrhea, sneezing, sore throat and trouble swallowing  Eyes: Negative for pain and visual disturbance  Respiratory: Negative for apnea, cough, shortness of breath and wheezing  Cardiovascular: Negative for chest pain and palpitations  Gastrointestinal: Positive for nausea  Negative for abdominal pain, constipation, diarrhea and vomiting  Genitourinary: Negative for dysuria  Musculoskeletal: Negative for arthralgias, gait problem and myalgias  Neurological: Positive for dizziness  Negative for syncope, weakness, light-headedness, numbness and headaches  Psychiatric/Behavioral: Negative for suicidal ideas  The patient is not nervous/anxious  Objective:  Vitals:    08/25/22 0938   BP: 128/80   Pulse: (!) 116   Temp: 98 1 °F (36 7 °C)   SpO2: 96%   Weight: 112 kg (246 lb)   Height: 5' 9" (1 753 m)     Body mass index is 36 33 kg/m²  Physical Exam  Vitals and nursing note reviewed  Constitutional:       Appearance: He is well-developed  HENT:      Head: Normocephalic and atraumatic        Right Ear: Tympanic membrane, ear canal and external ear normal       Left Ear: Tympanic membrane, ear canal and external ear normal       Nose: Nose normal       Mouth/Throat:      Pharynx: No oropharyngeal exudate or posterior oropharyngeal erythema  Eyes:      Pupils: Pupils are equal, round, and reactive to light  Cardiovascular:      Rate and Rhythm: Normal rate and regular rhythm  Heart sounds: Normal heart sounds  No murmur heard  No friction rub  No gallop  Pulmonary:      Effort: Pulmonary effort is normal  No respiratory distress  Breath sounds: Normal breath sounds  No wheezing or rales  Musculoskeletal:         General: Normal range of motion  Cervical back: Normal range of motion and neck supple  Lymphadenopathy:      Cervical: No cervical adenopathy  Skin:     General: Skin is warm and dry  Neurological:      Mental Status: He is alert and oriented to person, place, and time  Motor: No weakness  Coordination: Coordination normal       Gait: Gait normal       Comments: Positive Webster-Hallpike Maneuver bilaterally   Psychiatric:         Behavior: Behavior normal          Thought Content:  Thought content normal          Judgment: Judgment normal

## 2022-09-09 ENCOUNTER — TELEPHONE (OUTPATIENT)
Dept: FAMILY MEDICINE CLINIC | Facility: CLINIC | Age: 22
End: 2022-09-09

## 2022-09-09 ENCOUNTER — OFFICE VISIT (OUTPATIENT)
Dept: FAMILY MEDICINE CLINIC | Facility: CLINIC | Age: 22
End: 2022-09-09
Payer: COMMERCIAL

## 2022-09-09 VITALS
WEIGHT: 245.8 LBS | HEART RATE: 106 BPM | TEMPERATURE: 98.9 F | SYSTOLIC BLOOD PRESSURE: 110 MMHG | BODY MASS INDEX: 36.4 KG/M2 | HEIGHT: 69 IN | DIASTOLIC BLOOD PRESSURE: 78 MMHG | OXYGEN SATURATION: 96 %

## 2022-09-09 DIAGNOSIS — H81.13 BENIGN PAROXYSMAL POSITIONAL VERTIGO DUE TO BILATERAL VESTIBULAR DISORDER: Primary | ICD-10-CM

## 2022-09-09 PROCEDURE — 99213 OFFICE O/P EST LOW 20 MIN: CPT | Performed by: PHYSICIAN ASSISTANT

## 2022-09-09 RX ORDER — MECLIZINE HYDROCHLORIDE 25 MG/1
25 TABLET ORAL EVERY 8 HOURS PRN
Qty: 30 TABLET | Refills: 0 | Status: SHIPPED | OUTPATIENT
Start: 2022-09-09

## 2022-09-09 NOTE — PROGRESS NOTES
Assessment/Plan:    Problem List Items Addressed This Visit    None     Visit Diagnoses     Benign paroxysmal positional vertigo due to bilateral vestibular disorder    -  Primary    Relevant Medications    meclizine (ANTIVERT) 25 mg tablet    Other Relevant Orders    Ambulatory Referral to Physical Therapy           Diagnoses and all orders for this visit:    Benign paroxysmal positional vertigo due to bilateral vestibular disorder  -     meclizine (ANTIVERT) 25 mg tablet; Take 1 tablet (25 mg total) by mouth every 8 (eight) hours as needed for dizziness or nausea  -     Ambulatory Referral to Physical Therapy; Future      Referral to vestibular therapy  Educated on being careful with position changes  Will increase meclizine from 12 5 mg to 25 mg  Advised him not to take meclizine prior to therapy  Subjective:      Patient ID: Louisa Umanzor is a 25 y o  male  Wicho Vanessa is a pleasant 25year old male who is here today for a follow-up for persistent vertigo  He was seen 2 weeks ago and treated with meclizine  Sometimes it helps, sometimes it does not  He gets very dizzy with position changes  This has been ongoing for the past 1-2 months, but has been worse over the past week  The following portions of the patient's history were reviewed and updated as appropriate:   He has no past medical history on file  ,  does not have any pertinent problems on file  ,   has no past surgical history on file  ,  family history includes No Known Problems in his father and mother  ,   reports that he has quit smoking  He quit after 3 00 years of use  He has never used smokeless tobacco  He reports previous alcohol use  He reports previous drug use ,  has No Known Allergies     Current Outpatient Medications   Medication Sig Dispense Refill    acetaminophen (TYLENOL) 325 mg tablet Take 650 mg by mouth every 6 (six) hours as needed for mild pain      meclizine (ANTIVERT) 25 mg tablet Take 1 tablet (25 mg total) by mouth every 8 (eight) hours as needed for dizziness or nausea 30 tablet 0    omeprazole (PriLOSEC) 10 mg delayed release capsule Take 10 mg by mouth daily      QUEtiapine (SEROquel) 50 mg tablet Take 1 tablet (50 mg total) by mouth daily at bedtime 30 tablet 1    sertraline (ZOLOFT) 100 mg tablet Take 1 tablet (100 mg total) by mouth daily 30 tablet 1    hydrOXYzine pamoate (VISTARIL) 50 mg capsule Take 1 capsule (50 mg total) by mouth 3 (three) times a day as needed for anxiety for up to 10 days 90 capsule 0     No current facility-administered medications for this visit  Review of Systems   Constitutional: Negative for chills, diaphoresis, fatigue and fever  HENT: Negative for congestion, ear pain, postnasal drip, rhinorrhea, sneezing, sore throat and trouble swallowing  Eyes: Negative for pain and visual disturbance  Respiratory: Negative for apnea, cough, shortness of breath and wheezing  Cardiovascular: Negative for chest pain and palpitations  Gastrointestinal: Negative for abdominal pain, constipation, diarrhea, nausea and vomiting  Genitourinary: Negative for dysuria and hematuria  Musculoskeletal: Negative for arthralgias, gait problem and myalgias  Neurological: Positive for dizziness  Negative for syncope, weakness, light-headedness, numbness and headaches  Psychiatric/Behavioral: Negative for suicidal ideas  The patient is not nervous/anxious  Objective:  Vitals:    09/09/22 1401   BP: 110/78   Pulse: (!) 106   Temp: 98 9 °F (37 2 °C)   SpO2: 96%   Weight: 111 kg (245 lb 12 8 oz)   Height: 5' 9" (1 753 m)     Body mass index is 36 3 kg/m²  Physical Exam  Vitals and nursing note reviewed  Constitutional:       Appearance: He is well-developed  HENT:      Head: Normocephalic and atraumatic        Right Ear: Tympanic membrane, ear canal and external ear normal       Left Ear: Tympanic membrane, ear canal and external ear normal       Nose: Nose normal    Eyes: Extraocular Movements: Extraocular movements intact  Cardiovascular:      Rate and Rhythm: Normal rate and regular rhythm  Heart sounds: Normal heart sounds  No murmur heard  No friction rub  No gallop  Pulmonary:      Effort: Pulmonary effort is normal  No respiratory distress  Breath sounds: Normal breath sounds  No wheezing or rales  Musculoskeletal:         General: Normal range of motion  Cervical back: Normal range of motion and neck supple  Lymphadenopathy:      Cervical: No cervical adenopathy  Skin:     General: Skin is warm and dry  Neurological:      Mental Status: He is alert and oriented to person, place, and time  Psychiatric:         Mood and Affect: Mood is anxious  Behavior: Behavior normal          Thought Content:  Thought content normal          Judgment: Judgment normal

## 2022-09-13 ENCOUNTER — EVALUATION (OUTPATIENT)
Dept: PHYSICAL THERAPY | Facility: HOME HEALTHCARE | Age: 22
End: 2022-09-13
Payer: COMMERCIAL

## 2022-09-13 DIAGNOSIS — H81.13 BENIGN PAROXYSMAL POSITIONAL VERTIGO DUE TO BILATERAL VESTIBULAR DISORDER: ICD-10-CM

## 2022-09-13 PROCEDURE — 97112 NEUROMUSCULAR REEDUCATION: CPT | Performed by: PHYSICAL THERAPIST

## 2022-09-13 PROCEDURE — 97162 PT EVAL MOD COMPLEX 30 MIN: CPT | Performed by: PHYSICAL THERAPIST

## 2022-09-13 PROCEDURE — 95992 CANALITH REPOSITIONING PROC: CPT | Performed by: PHYSICAL THERAPIST

## 2022-09-13 NOTE — PROGRESS NOTES
PT Evaluation     Today's date: 2022  Patient name: Sahil Gr  : 2000  MRN: [id number]  Referring provider: Rossy Briones PA-C  Dx:   Encounter Diagnosis     ICD-10-CM    1  Benign paroxysmal positional vertigo due to bilateral vestibular disorder  H81 13 Ambulatory Referral to Physical Therapy                  Assessment  Assessment details: Patient Sahil Gr is a 25 y o  who presents with s/s consistent with BPPV  Based on presentation the patient has a (+) Hallpike-Zehra on the L which is contributing to vertiginous symptoms  PT to complete R sided assessment upcoming as to avoid flare up of sx on IE date  Patient responded well to a particle repositioning technique after 1 trial  PT also observed significant hypofunction of the VOR with saccadic eye movements observed, however pt was able to complete upwards of 30" without stopping  Pt will follow up for treatments as indicated to address deficits  Pt would benefit from skilled therapy to address outlined symptoms and return safety with daily activities    Impairments: abnormal gait, abnormal or restricted ROM, abnormal movement, activity intolerance, difficulty understanding, impaired balance, lacks appropriate home exercise program and safety issue  Understanding of Dx/Px/POC: good   Prognosis: good    Goals  STG: to be achieved in 4 weeks   1) Independent in basic Home Exercise Program for balance   2) Pt reporting decrease in vertiginous symptoms > 50%     LTG: to be achieved in 8 weeks   1) Pt demonstrating low fall risk on balance assessment    2) Pt reporting alleviation of vertiginous symptoms   3) Pt to be free from s/s of BPPV in bilateral canals   4) Pt free from falls     Plan  Patient would benefit from: skilled physical therapy  Planned therapy interventions: balance, manual therapy, canalith repositioning, neuromuscular re-education, patient education, postural training, therapeutic exercise, therapeutic activities, home exercise program, functional ROM exercises and gait training  Frequency: 2x week  Duration in weeks: 4  Plan of Care beginning date: 9/13/2022  Plan of Care expiration date: 10/13/2022  Treatment plan discussed with: patient and referring physician        Subjective Evaluation    History of Present Illness  Mechanism of injury: Pt reporting that he has been having dizziness for the past 1-2 months  He went to PCP who laid him back on the table and he got really dizzy; dx with BPPV and given Meclizine to take  Referred to OPPT for conservative management of s/s as well  Pt notes that he did not take medication today and really didn't notice a difference with or without it  Pain  No pain reported    Social Support    Employment status: working    Diagnostic Tests  No diagnostic tests performed  Treatments  Previous treatment: medication  Current treatment: physical therapy  Patient Goals  Patient goal: "get rid of the dizziness"         Objective     Concurrent Complaints  Positive for headaches  Neuro Exam:     Dizziness  Positive for vertigo, oscillopsia, motion sickness, light-headedness, rocking or swaying and diplopia  Exacerbating factors  Positive for bending over, rolling in bed, looking up, walking, turning head, supine to/from sitting, optokinetic movement and walking in busy environment  Headaches   Patient reports headaches: Yes       Oculomotor exam   Resting nystagmus: not present   Smooth pursuits: saccadic smooth pursuit  Vertical saccades: hypometric  Horizontal saccades: hypometric     Positional testing   Zehra-Hallpike   Left posterior canal: symptomatic  Positional testing comment: VOR x 1 seated   100 bpm R/L x 30"   100 bpm U/D x 30"     VOR x 2 seated   80 bpm R/L 30"  80 bpm U/D 30"              Re-eval Date: 10/13/22     Precautions: vertigo        Manuals 9/13                                       Neuro Re-Ed         Zehra-Hallpike  (+)L       Epley  L x 1        VOR x 1  See eval VOR x 2  See aurora Wade                         Ther Ex        NuStep - sx dump                                                                 Ther Activity                        Gait Training                        Modalities

## 2022-09-19 ENCOUNTER — APPOINTMENT (OUTPATIENT)
Dept: PHYSICAL THERAPY | Facility: HOME HEALTHCARE | Age: 22
End: 2022-09-19
Payer: COMMERCIAL

## 2022-10-10 PROBLEM — Z91.199 NO-SHOW FOR APPOINTMENT: Status: ACTIVE | Noted: 2022-10-10

## 2022-10-11 ENCOUNTER — TELEPHONE (OUTPATIENT)
Dept: BEHAVIORAL/MENTAL HEALTH CLINIC | Facility: CLINIC | Age: 22
End: 2022-10-11

## 2022-11-22 DIAGNOSIS — F41.1 GENERALIZED ANXIETY DISORDER: ICD-10-CM

## 2022-11-22 RX ORDER — SERTRALINE HYDROCHLORIDE 100 MG/1
100 TABLET, FILM COATED ORAL DAILY
Qty: 30 TABLET | Refills: 1 | Status: SHIPPED | OUTPATIENT
Start: 2022-11-22 | End: 2022-11-28

## 2022-11-28 ENCOUNTER — OFFICE VISIT (OUTPATIENT)
Dept: PSYCHIATRY | Facility: CLINIC | Age: 22
End: 2022-11-28

## 2022-11-28 DIAGNOSIS — F41.0 PANIC DISORDER: ICD-10-CM

## 2022-11-28 DIAGNOSIS — F41.1 GENERALIZED ANXIETY DISORDER: ICD-10-CM

## 2022-11-28 DIAGNOSIS — F33.1 MODERATE EPISODE OF RECURRENT MAJOR DEPRESSIVE DISORDER (HCC): Primary | ICD-10-CM

## 2022-11-28 DIAGNOSIS — G47.00 INSOMNIA, UNSPECIFIED TYPE: ICD-10-CM

## 2022-11-28 RX ORDER — QUETIAPINE FUMARATE 100 MG/1
100 TABLET, FILM COATED ORAL
Qty: 30 TABLET | Refills: 2 | Status: SHIPPED | OUTPATIENT
Start: 2022-11-28

## 2022-11-28 RX ORDER — HYDROXYZINE PAMOATE 50 MG/1
50 CAPSULE ORAL 3 TIMES DAILY PRN
Qty: 90 CAPSULE | Refills: 0 | Status: SHIPPED | OUTPATIENT
Start: 2022-11-28 | End: 2022-12-28

## 2022-11-28 RX ORDER — SERTRALINE HYDROCHLORIDE 100 MG/1
150 TABLET, FILM COATED ORAL DAILY
Qty: 45 TABLET | Refills: 1
Start: 2022-11-28

## 2022-11-28 RX ORDER — OMEPRAZOLE 40 MG/1
CAPSULE, DELAYED RELEASE ORAL
COMMUNITY
Start: 2022-09-30

## 2022-11-28 NOTE — PSYCH
Regular Visit    Problem List Items Addressed This Visit        Other    Generalized anxiety disorder    Relevant Medications    hydrOXYzine pamoate (VISTARIL) 50 mg capsule    sertraline (ZOLOFT) 100 mg tablet    QUEtiapine (SEROquel) 100 mg tablet    Panic disorder    Relevant Medications    hydrOXYzine pamoate (VISTARIL) 50 mg capsule    sertraline (ZOLOFT) 100 mg tablet    QUEtiapine (SEROquel) 100 mg tablet   Other Visit Diagnoses     Moderate episode of recurrent major depressive disorder (HCC)    -  Primary    Relevant Medications    hydrOXYzine pamoate (VISTARIL) 50 mg capsule    sertraline (ZOLOFT) 100 mg tablet    QUEtiapine (SEROquel) 100 mg tablet    Insomnia, unspecified type        Relevant Medications    hydrOXYzine pamoate (VISTARIL) 50 mg capsule    QUEtiapine (SEROquel) 100 mg tablet             Encounter provider HOLGER Panchal    Provider located at    [address] [place] [address]  [address] [facility] [id number] [phone]    Recent Visits  No visits were found meeting these conditions  Showing recent visits within past 7 days and meeting all other requirements  Today's Visits  Date Type Provider Dept   11/28/22 Office Visit HOLGER Panchal  [facility]   Showing today's visits and meeting all other requirements  Future Appointments  No visits were found meeting these conditions    Showing future appointments within next 150 days and meeting all other requirements       HPI     Current Outpatient Medications   Medication Sig Dispense Refill   • hydrOXYzine pamoate (VISTARIL) 50 mg capsule Take 1 capsule (50 mg total) by mouth 3 (three) times a day as needed for anxiety 90 capsule 0   • QUEtiapine (SEROquel) 100 mg tablet Take 1 tablet (100 mg total) by mouth daily at bedtime 30 tablet 2   • sertraline (ZOLOFT) 100 mg tablet Take 1 5 tablets (150 mg total) by mouth daily 45 tablet 1   • acetaminophen (TYLENOL) 325 mg tablet Take 650 mg by mouth every 6 (six) hours as needed for mild pain     • meclizine (ANTIVERT) 25 mg tablet Take 1 tablet (25 mg total) by mouth every 8 (eight) hours as needed for dizziness or nausea 30 tablet 0   • omeprazole (PriLOSEC) 10 mg delayed release capsule Take 10 mg by mouth daily     • omeprazole (PriLOSEC) 40 MG capsule        No current facility-administered medications for this visit  Review of Systems        MEDICATION MANAGEMENT NOTE        [facility]    Name and Date of Birth:  Hailey Morse 25 y o  2000 MRN: [id number]    Date of Visit: November 28, 2022    No Known Allergies  SUBJECTIVE:    Tu Castelan is seen today for a follow up for Major Depressive Disorder, Generalized Anxiety Disorder and insomnia  He continues to experience on and off symptoms since the last visit  Patient had ER visit back in October 2022 for increased depression with passive suicidal ideation, states he all of a sudden became overwhelmed with negative thoughts which triggered the passive suicidal ideation and ER visit  Received p r n  Ativan in the ER and was shortly discharged  States he frequently worries about his future and this causes a level of anxiety that triggers depression and occasionally passive suicidal ideation  Denies ever having a plan to hurt himself and agrees to go to the emergency room if suicidal thoughts worsen or if he ever develops a plan  Patient has been somewhat noncompliant with mental treatment in the recent past-no showed last appointment, previously admits to missing multiple days of medications  Since October, states he has been taking the Zoloft faithfully and has noticed some improvement  Titrate Zoloft to 150 milligrams daily today and titrate Seroquel to 100 milligrams at  for insomnia and racing thoughts   History of difficulty sleeping due to racing thoughts + anxiety, when he takes Seroquel he is able to calm down and get a good night's sleep  Denies side effects from psychiatric medications  Recently no showed to his initial psychotherapy session, provider explained the importance of regular psychotherapy and patient appeared to agree  He was given the number to therapy intake to hopefully re-initiate therapy  Reports 6/10 depression with symptoms of dysphoria, anhedonia, negative thoughts regarding his future, worry about his future, lack of energy  Main stressors include financial issues  Continues to work at his current job, [employer], and enjoys it  Protective factors include his girlfriend who is very supportive and his parents  Continues to live with his parents  Patient would highly benefit from regular psychotherapy with a CBT approach, provider gave him additional resources if he is not able to reconnect with the [address] [place] [address] therapist which he recently no-showed  Denies current suicidal ideation  He denies any side effects from medications      PLAN:    -initiate psychotherapy  -Titrate Zoloft to 150 mg daily for depressive and anxiety symptoms PARQ completed including serotonin syndrome, SIADH, worsening depression, suicidality, induction of aron, GI upset, headaches, activation, sexual side effects, sedation, potential drug interactions, and others   -Continue prn Atarax 50mg daily for breakthrough anxiety  -Titrate Seroquel to 100mg daily at hs for insomnia, effective when taking PARQ completed including dizziness, sedation, GI distress, orthostatic hypotension and cardiovascular risks, metabolic syndrome, NMS, TD, EPS, Seizures, and others        Aware of 24 hour and weekend coverage for urgent situations accessed by calling [facility] main practice number  Referral for individual psychotherapy    Diagnoses and all orders for this visit:    Moderate episode of recurrent major depressive disorder (HCC)    Generalized anxiety disorder  -     hydrOXYzine pamoate (VISTARIL) 50 mg capsule; Take 1 capsule (50 mg total) by mouth 3 (three) times a day as needed for anxiety  -     sertraline (ZOLOFT) 100 mg tablet; Take 1 5 tablets (150 mg total) by mouth daily  -     QUEtiapine (SEROquel) 100 mg tablet; Take 1 tablet (100 mg total) by mouth daily at bedtime    Panic disorder  -     hydrOXYzine pamoate (VISTARIL) 50 mg capsule; Take 1 capsule (50 mg total) by mouth 3 (three) times a day as needed for anxiety    Insomnia, unspecified type  -     hydrOXYzine pamoate (VISTARIL) 50 mg capsule; Take 1 capsule (50 mg total) by mouth 3 (three) times a day as needed for anxiety  -     QUEtiapine (SEROquel) 100 mg tablet; Take 1 tablet (100 mg total) by mouth daily at bedtime    Other orders  -     omeprazole (PriLOSEC) 40 MG capsule        Current Outpatient Medications on File Prior to Visit   Medication Sig Dispense Refill   • acetaminophen (TYLENOL) 325 mg tablet Take 650 mg by mouth every 6 (six) hours as needed for mild pain     • meclizine (ANTIVERT) 25 mg tablet Take 1 tablet (25 mg total) by mouth every 8 (eight) hours as needed for dizziness or nausea 30 tablet 0   • omeprazole (PriLOSEC) 10 mg delayed release capsule Take 10 mg by mouth daily     • omeprazole (PriLOSEC) 40 MG capsule      • [DISCONTINUED] hydrOXYzine pamoate (VISTARIL) 50 mg capsule Take 1 capsule (50 mg total) by mouth 3 (three) times a day as needed for anxiety for up to 10 days 90 capsule 0   • [DISCONTINUED] QUEtiapine (SEROquel) 50 mg tablet Take 1 tablet (50 mg total) by mouth daily at bedtime 30 tablet 1   • [DISCONTINUED] sertraline (ZOLOFT) 100 mg tablet Take 1 tablet (100 mg total) by mouth daily 30 tablet 1     No current facility-administered medications on file prior to visit  HPI ROS Appetite Changes and Sleep:     He reports difficulty falling asleep, adequate appetite, low energy   Denies homicidal ideation, denies suicidal ideation    Review Of Systems:      HPI ROS:               Medication Side Effects:  denies    Depression (10 worst): 6/10    Anxiety (10 worst): 5/10    Safety concerns (SI, HI, etc): Denies si and hi    Sleep: 6-7 hrs    Energy: Fair to low    Appetite: 2-3 meals daily    Weight Change: denies        Mental Status Evaluation:    Appearance Adequate hygiene and grooming   Behavior calm and cooperative   Mood anxious and depressed  Depression Scale - 6 of 10 (0 = No depression)  Anxiety Scale - 5 of 10 (0 = No anxiety)   Speech Normal rate and volume   Affect mood-congruent   Thought Processes Goal directed and coherent   Thought Content Does not verbalize delusional material   Associations Tightly connected   Perceptual Disturbances Denies hallucinations and does not appear to be responding to internal stimuli   Risk Potential Suicidal/Homicidal Ideation - No evidence of suicidal or homicidal ideation and patient does not verbalize suicidal or homicidal ideation  Risk of Violence - No evidence of risk for violence found on assessment  Risk of Self Mutilation - No evidence of risk for self mutilation found on assessment   Orientation oriented to person, place, time/date and situation   Memory recent and remote memory grossly intact   Consciousness alert and awake   Attention/Concentration attention span and concentration are age appropriate   Insight intact   Judgement intact   Muscle Strength and Gait normal muscle strength and normal muscle tone, normal gait/station and normal balance   Motor Activity no abnormal movements   Language no difficulty naming common objects, no difficulty repeating a phrase, no difficulty writing a sentence   Fund of Knowledge adequate knowledge of current events  adequate fund of knowledge regarding past history  adequate fund of knowledge regarding vocabulary      Past Psychiatric History Update:     Inpatient Psychiatric Admission Since Last Encounter:   no  Changes to Outpatient Psychiatric Treatment Team:    no  Suicide Attempt Or Self Mutilation Since Last Encounter:   no  Incidence of Violent Behavior Since Last Encounter:   no    Traumatic History Update:     New Onset of Abuse Since Last Encounter:   no  Traumatic Events Since Last Encounter:   no    Past Medical History:    No past medical history on file  No past surgical history on file  No Known Allergies  Substance Abuse History:    Social History     Substance and Sexual Activity   Alcohol Use Not Currently     Social History     Substance and Sexual Activity   Drug Use Not Currently     Social History:    Social History     Socioeconomic History   • Marital status: Single     Spouse name: Not on file   • Number of children: Not on file   • Years of education: Not on file   • Highest education level: Not on file   Occupational History   • Not on file   Tobacco Use   • Smoking status: Former     Years: 3 00     Types: Cigarettes   • Smokeless tobacco: Never   • Tobacco comments:     once monthy   Vaping Use   • Vaping Use: Former   Substance and Sexual Activity   • Alcohol use: Not Currently   • Drug use: Not Currently   • Sexual activity: Not on file   Other Topics Concern   • Not on file   Social History Narrative   • Not on file     Social Determinants of Health     Financial Resource Strain: Not on file   Food Insecurity: Not on file   Transportation Needs: Not on file   Physical Activity: Not on file   Stress: Not on file   Social Connections: Not on file   Intimate Partner Violence: Not on file   Housing Stability: Not on file     Family Psychiatric History:     Family History   Problem Relation Age of Onset   • No Known Problems Mother    • No Known Problems Father      History Review: The following portions of the patient's history were reviewed and updated as appropriate: allergies, current medications, past family history, past medical history, past social history, past surgical history and problem list     OBJECTIVE:     Vital signs in last 24 hours:     There were no vitals filed for this visit  Laboratory Results:   Recent Labs (last 2 months):   No visits with results within 2 Month(s) from this visit     Latest known visit with results is:   Admission on 09/19/2020, Discharged on 09/19/2020   Component Date Value   • WBC 09/19/2020 6 48    • RBC 09/19/2020 4 68    • Hemoglobin 09/19/2020 14 6    • Hematocrit 09/19/2020 42 3    • MCV 09/19/2020 90    • MCH 09/19/2020 31 2    • MCHC 09/19/2020 34 5    • RDW 09/19/2020 12 1    • MPV 09/19/2020 10 1    • Platelets 68/54/2860 255    • nRBC 09/19/2020 0    • Neutrophils Relative 09/19/2020 46    • Immat GRANS % 09/19/2020 0    • Lymphocytes Relative 09/19/2020 36    • Monocytes Relative 09/19/2020 15 (H)    • Eosinophils Relative 09/19/2020 2    • Basophils Relative 09/19/2020 1    • Neutrophils Absolute 09/19/2020 3 00    • Immature Grans Absolute 09/19/2020 0 01    • Lymphocytes Absolute 09/19/2020 2 35    • Monocytes Absolute 09/19/2020 0 94    • Eosinophils Absolute 09/19/2020 0 12    • Basophils Absolute 09/19/2020 0 06    • Sodium 09/19/2020 139    • Potassium 09/19/2020 4 2    • Chloride 09/19/2020 103    • CO2 09/19/2020 30    • ANION GAP 09/19/2020 6    • BUN 09/19/2020 12    • Creatinine 09/19/2020 0 85    • Glucose 09/19/2020 92    • Calcium 09/19/2020 9 2    • AST 09/19/2020 37    • ALT 09/19/2020 58    • Alkaline Phosphatase 09/19/2020 104    • Total Protein 09/19/2020 7 7    • Albumin 09/19/2020 4 0    • Total Bilirubin 09/19/2020 0 50    • eGFR 09/19/2020 125    • Lipase 09/19/2020 72 (L)    • Color, UA 09/19/2020 Yellow    • Clarity, UA 09/19/2020 Clear    • Specific Gravity, UA 09/19/2020 1 010    • pH, UA 09/19/2020 6 5    • Leukocytes, UA 09/19/2020 Negative    • Nitrite, UA 09/19/2020 Negative    • Protein, UA 09/19/2020 Negative    • Glucose, UA 09/19/2020 Negative    • Ketones, UA 09/19/2020 Negative    • Urobilinogen, UA 09/19/2020 0 2    • Bilirubin, UA 09/19/2020 Negative    • Occult Blood, UA 09/19/2020 Negative      I have personally reviewed all pertinent laboratory/tests results  Suicide/Homicide Risk Assessment:    Risk of Harm to Self:  Protective Factors: no current suicidal ideation, access to mental health treatment, compliant with medications, compliant with mental health treatment, connection to community, having a desire to be alive, having a sense of purpose or meaning in life, no substance use problems, resiliency, stable living environment, stable job  Based on today's assessment, Christine Martin presents the following risk of harm to self: minimal    Risk of Harm to Others:  Based on today's assessment, Christine Martin presents the following risk of harm to others: minimal    The following interventions are recommended: referral for psychotherapy    Medications Risks/Benefits:      Risks, Benefits And Possible Side Effects Of Medications:    Discussed risks and benefits of treatment with patient including risk of suicidality, serotonin syndrome, increased QTc interval and SIADH related to treatment with antidepressants; Risk of induction of manic symptoms in certain patient populations and risk of parkinsonian symptoms, metabolic syndrome, tardive dyskinesia and neuroleptic malignant syndrome related to treatment with antipsychotic medications     Controlled Medication Discussion:     Not applicable    Treatment Plan:    Due for update/Updated:   yes  Last treatment plan done 3/4/22 by warner hale  Treatment Plan due on 9/4/22      HOLGER Pyle 11/28/22  Visit Time    Visit Start Time: 2:05  Visit Stop Time: 2:35  Total Visit Duration: 30 minutes

## 2023-01-30 DIAGNOSIS — F41.1 GENERALIZED ANXIETY DISORDER: ICD-10-CM

## 2023-01-31 RX ORDER — SERTRALINE HYDROCHLORIDE 100 MG/1
TABLET, FILM COATED ORAL
Qty: 30 TABLET | Refills: 0 | Status: SHIPPED | OUTPATIENT
Start: 2023-01-31 | End: 2023-02-06 | Stop reason: SDUPTHER

## 2023-02-06 ENCOUNTER — OFFICE VISIT (OUTPATIENT)
Dept: PSYCHIATRY | Facility: CLINIC | Age: 23
End: 2023-02-06

## 2023-02-06 DIAGNOSIS — F33.1 MODERATE EPISODE OF RECURRENT MAJOR DEPRESSIVE DISORDER (HCC): Primary | ICD-10-CM

## 2023-02-06 DIAGNOSIS — G47.00 INSOMNIA, UNSPECIFIED TYPE: ICD-10-CM

## 2023-02-06 DIAGNOSIS — F41.1 GENERALIZED ANXIETY DISORDER: ICD-10-CM

## 2023-02-06 RX ORDER — TRAZODONE HYDROCHLORIDE 50 MG/1
50 TABLET ORAL
Qty: 90 TABLET | Refills: 0 | Status: SHIPPED | OUTPATIENT
Start: 2023-02-06

## 2023-02-06 RX ORDER — HYDROXYZINE PAMOATE 50 MG/1
50 CAPSULE ORAL 3 TIMES DAILY PRN
Qty: 180 CAPSULE | Refills: 0 | Status: SHIPPED | OUTPATIENT
Start: 2023-02-06 | End: 2023-04-07

## 2023-02-06 RX ORDER — SERTRALINE HYDROCHLORIDE 100 MG/1
200 TABLET, FILM COATED ORAL DAILY
Qty: 180 TABLET | Refills: 0 | Status: SHIPPED | OUTPATIENT
Start: 2023-02-06

## 2023-02-06 RX ORDER — QUETIAPINE FUMARATE 100 MG/1
100 TABLET, FILM COATED ORAL
Qty: 90 TABLET | Refills: 0 | Status: SHIPPED | OUTPATIENT
Start: 2023-02-06

## 2023-02-06 NOTE — PSYCH
Regular Visit    Problem List Items Addressed This Visit        Other    Generalized anxiety disorder    Relevant Medications    sertraline (ZOLOFT) 100 mg tablet    QUEtiapine (SEROquel) 100 mg tablet    traZODone (DESYREL) 50 mg tablet    hydrOXYzine pamoate (VISTARIL) 50 mg capsule   Other Visit Diagnoses     Moderate episode of recurrent major depressive disorder (HCC)    -  Primary    Relevant Medications    sertraline (ZOLOFT) 100 mg tablet    QUEtiapine (SEROquel) 100 mg tablet    traZODone (DESYREL) 50 mg tablet    hydrOXYzine pamoate (VISTARIL) 50 mg capsule    Other Relevant Orders    CBC and differential    Lipid Panel with Direct LDL reflex    HEMOGLOBIN A1C W/ EAG ESTIMATION    Comprehensive metabolic panel    TSH, 3rd generation with Free T4 reflex    Insomnia, unspecified type        Relevant Medications    QUEtiapine (SEROquel) 100 mg tablet    traZODone (DESYREL) 50 mg tablet    hydrOXYzine pamoate (VISTARIL) 50 mg capsule             Encounter provider HOLGER Sr    Provider located at    [address] [place] [id number] [phone]    Recent Visits  No visits were found meeting these conditions  Showing recent visits within past 7 days and meeting all other requirements  Today's Visits  Date Type Provider Dept   02/06/23 Office Visit HOLGER Sr  [facility]   Showing today's visits and meeting all other requirements  Future Appointments  No visits were found meeting these conditions    Showing future appointments within next 150 days and meeting all other requirements       HPI     Current Outpatient Medications   Medication Sig Dispense Refill   • acetaminophen (TYLENOL) 325 mg tablet Take 650 mg by mouth every 6 (six) hours as needed for mild pain     • hydrOXYzine pamoate (VISTARIL) 50 mg capsule Take 1 capsule (50 mg total) by mouth 3 (three) times a day as needed for anxiety 180 capsule 0   • meclizine (ANTIVERT) 25 mg tablet Take 1 tablet (25 mg total) by mouth every 8 (eight) hours as needed for dizziness or nausea 30 tablet 0   • omeprazole (PriLOSEC) 10 mg delayed release capsule Take 10 mg by mouth daily     • omeprazole (PriLOSEC) 40 MG capsule      • QUEtiapine (SEROquel) 100 mg tablet Take 1 tablet (100 mg total) by mouth daily at bedtime 90 tablet 0   • sertraline (ZOLOFT) 100 mg tablet Take 2 tablets (200 mg total) by mouth daily 180 tablet 0   • traZODone (DESYREL) 50 mg tablet Take 1 tablet (50 mg total) by mouth daily at bedtime 90 tablet 0     No current facility-administered medications for this visit  Review of Systems        MEDICATION MANAGEMENT NOTE        [address] [place] [address]    Name and Date of Birth:  Claudean Limber 25 y o  2000 MRN: [id number]    Date of Visit: February 6, 2023    No Known Allergies  SUBJECTIVE:    Leandra Reina is seen today for a follow up for Major Depressive Disorder and Generalized Anxiety Disorder  He continues to improve gradually since the last visit  Patient appears to be in somewhat better spirits compared to previous visit where he was struggling with more depressive thoughts  He is noticeably brighter, more forward thinking-reports improved compliance with his psychiatric medications and is doing his best to take on a daily basis  Shares that he titrated Zoloft to 200 mg daily on his own and the increased dosage has been helpful  Still experiences about 1 day/week with thoughts of being hopeless and helpless, patient has difficulty understanding what causes this  Sleep has improved with Seroquel 100 mg at bedtime but at times admits he uses more, will trial trazodone 50 mg at bedtime for insomnia in attempts to minimize Seroquel use and associated minimize weight gain    Main stressor currently is his girlfriend's busy work schedule and not be able to see her much during the past month  This will be alleviated as she is moving out of her parent's house and into her sister's house  He remains busy at work, seems to be doing better when he is busy and reports less racing thoughts and anxiety when his mind is occupied  5/10 chronic depression and anxiety  Encouraged patient to stay active, eat healthy, exercise daily, participate in his hobbies  Reminded patient about the partial program if ever needed in the future, patient demonstrates understanding  Continues to be on psychotherapy wait list  Complete pending labs  Denies SI  He denies any side effects from medications  PLAN:      -Titrate Zoloft to 200 mg daily for depressive and anxiety symptoms PARQ completed including serotonin syndrome, SIADH, worsening depression, suicidality, induction of aron, GI upset, headaches, activation, sexual side effects, sedation, potential drug interactions, and others   -Continue prn Atarax 50mg daily for breakthrough anxiety  -Continue Seroquel to 100mg daily at hs for insomnia, effective when taking PARQ completed including dizziness, sedation, GI distress, orthostatic hypotension and cardiovascular risks, metabolic syndrome, NMS, TD, EPS, Seizures, and others    -Initiate trazodone 50 mg at bedtime for insomnia, attempt to minimize Seroquel usage to avoid weight gain, complete pending lab work for next visit  Aware of 24 hour and weekend coverage for urgent situations accessed by calling [facility] main practice number  Referral for individual psychotherapy    Diagnoses and all orders for this visit:    Moderate episode of recurrent major depressive disorder ([facility] [address] )  -     CBC and differential; Future  -     Lipid Panel with Direct LDL reflex; Future  -     HEMOGLOBIN A1C W/ EAG ESTIMATION; Future  -     Comprehensive metabolic panel; Future  -     TSH, 3rd generation with Free T4 reflex;  Future    Generalized anxiety disorder  -     sertraline (ZOLOFT) 100 mg tablet; Take 2 tablets (200 mg total) by mouth daily  -     QUEtiapine (SEROquel) 100 mg tablet; Take 1 tablet (100 mg total) by mouth daily at bedtime  -     hydrOXYzine pamoate (VISTARIL) 50 mg capsule; Take 1 capsule (50 mg total) by mouth 3 (three) times a day as needed for anxiety    Insomnia, unspecified type  -     QUEtiapine (SEROquel) 100 mg tablet; Take 1 tablet (100 mg total) by mouth daily at bedtime  -     traZODone (DESYREL) 50 mg tablet; Take 1 tablet (50 mg total) by mouth daily at bedtime  -     hydrOXYzine pamoate (VISTARIL) 50 mg capsule; Take 1 capsule (50 mg total) by mouth 3 (three) times a day as needed for anxiety        Current Outpatient Medications on File Prior to Visit   Medication Sig Dispense Refill   • acetaminophen (TYLENOL) 325 mg tablet Take 650 mg by mouth every 6 (six) hours as needed for mild pain     • meclizine (ANTIVERT) 25 mg tablet Take 1 tablet (25 mg total) by mouth every 8 (eight) hours as needed for dizziness or nausea 30 tablet 0   • omeprazole (PriLOSEC) 10 mg delayed release capsule Take 10 mg by mouth daily     • omeprazole (PriLOSEC) 40 MG capsule      • [DISCONTINUED] hydrOXYzine pamoate (VISTARIL) 50 mg capsule Take 1 capsule (50 mg total) by mouth 3 (three) times a day as needed for anxiety 90 capsule 0   • [DISCONTINUED] QUEtiapine (SEROquel) 100 mg tablet Take 1 tablet (100 mg total) by mouth daily at bedtime 30 tablet 2   • [DISCONTINUED] sertraline (ZOLOFT) 100 mg tablet Take 1 tablet (100 mg total) by mouth daily 30 tablet 0     No current facility-administered medications on file prior to visit  HPI ROS Appetite Changes and Sleep:     He reports difficulty falling asleep, adequate appetite, adequate energy level   Denies homicidal ideation, denies suicidal ideation    Review Of Systems:      HPI ROS:               Medication Side Effects:  denies    Depression (10 worst): 5/10    Anxiety (10 worst): 5/10    Safety concerns (SI, HI, etc): Denies si and hi    Sleep: 7 hrs with seroquel    Energy: fair    Appetite: 2-3 meals daily    Weight Change: 1-2lb weight gain        Mental Status Evaluation:    Appearance Adequate hygiene and grooming   Behavior calm and cooperative   Mood anxious and depressed  Depression Scale - 5 of 10 (0 = No depression)  Anxiety Scale - 5 of 10 (0 = No anxiety)   Speech Normal rate and volume   Affect mood-congruent   Thought Processes Goal directed and coherent   Thought Content Does not verbalize delusional material   Associations Tightly connected   Perceptual Disturbances Denies hallucinations and does not appear to be responding to internal stimuli   Risk Potential Suicidal/Homicidal Ideation - No evidence of suicidal or homicidal ideation and patient does not verbalize suicidal or homicidal ideation  Risk of Violence - No evidence of risk for violence found on assessment  Risk of Self Mutilation - No evidence of risk for self mutilation found on assessment   Orientation oriented to person, place, time/date and situation   Memory recent and remote memory grossly intact   Consciousness alert and awake   Attention/Concentration attention span and concentration are age appropriate   Insight intact   Judgement intact   Muscle Strength and Gait normal muscle strength and normal muscle tone, normal gait/station and normal balance   Motor Activity no abnormal movements   Language no difficulty naming common objects, no difficulty repeating a phrase, no difficulty writing a sentence   Fund of Knowledge adequate knowledge of current events  adequate fund of knowledge regarding past history  adequate fund of knowledge regarding vocabulary      Past Psychiatric History Update:     Inpatient Psychiatric Admission Since Last Encounter:   no  Changes to Outpatient Psychiatric Treatment Team:    no  Suicide Attempt Or Self Mutilation Since Last Encounter:   no  Incidence of Violent Behavior Since Last Encounter:   no    Traumatic History Update:     New Onset of Abuse Since Last Encounter:   no  Traumatic Events Since Last Encounter:   no    Past Medical History:    History reviewed  No pertinent past medical history  History reviewed  No pertinent surgical history  No Known Allergies  Substance Abuse History:    Social History     Substance and Sexual Activity   Alcohol Use Not Currently     Social History     Substance and Sexual Activity   Drug Use Not Currently     Social History:    Social History     Socioeconomic History   • Marital status: Single     Spouse name: Not on file   • Number of children: Not on file   • Years of education: Not on file   • Highest education level: Not on file   Occupational History   • Not on file   Tobacco Use   • Smoking status: Former     Years: 3 00     Types: Cigarettes   • Smokeless tobacco: Never   • Tobacco comments:     once monthy   Vaping Use   • Vaping Use: Former   Substance and Sexual Activity   • Alcohol use: Not Currently   • Drug use: Not Currently   • Sexual activity: Not on file   Other Topics Concern   • Not on file   Social History Narrative   • Not on file     Social Determinants of Health     Financial Resource Strain: Not on file   Food Insecurity: Not on file   Transportation Needs: Not on file   Physical Activity: Not on file   Stress: Not on file   Social Connections: Not on file   Intimate Partner Violence: Not on file   Housing Stability: Not on file     Family Psychiatric History:     Family History   Problem Relation Age of Onset   • No Known Problems Mother    • No Known Problems Father      History Review: The following portions of the patient's history were reviewed and updated as appropriate: allergies, current medications, past family history, past medical history, past social history, past surgical history and problem list     OBJECTIVE:     Vital signs in last 24 hours: There were no vitals filed for this visit  Laboratory Results:   Recent Labs (last 2 months):    No visits with results within 2 Month(s) from this visit     Latest known visit with results is:   Admission on 09/19/2020, Discharged on 09/19/2020   Component Date Value   • WBC 09/19/2020 6 48    • RBC 09/19/2020 4 68    • Hemoglobin 09/19/2020 14 6    • Hematocrit 09/19/2020 42 3    • MCV 09/19/2020 90    • MCH 09/19/2020 31 2    • MCHC 09/19/2020 34 5    • RDW 09/19/2020 12 1    • MPV 09/19/2020 10 1    • Platelets 39/10/0713 255    • nRBC 09/19/2020 0    • Neutrophils Relative 09/19/2020 46    • Immat GRANS % 09/19/2020 0    • Lymphocytes Relative 09/19/2020 36    • Monocytes Relative 09/19/2020 15 (H)    • Eosinophils Relative 09/19/2020 2    • Basophils Relative 09/19/2020 1    • Neutrophils Absolute 09/19/2020 3 00    • Immature Grans Absolute 09/19/2020 0 01    • Lymphocytes Absolute 09/19/2020 2 35    • Monocytes Absolute 09/19/2020 0 94    • Eosinophils Absolute 09/19/2020 0 12    • Basophils Absolute 09/19/2020 0 06    • Sodium 09/19/2020 139    • Potassium 09/19/2020 4 2    • Chloride 09/19/2020 103    • CO2 09/19/2020 30    • ANION GAP 09/19/2020 6    • BUN 09/19/2020 12    • Creatinine 09/19/2020 0 85    • Glucose 09/19/2020 92    • Calcium 09/19/2020 9 2    • AST 09/19/2020 37    • ALT 09/19/2020 58    • Alkaline Phosphatase 09/19/2020 104    • Total Protein 09/19/2020 7 7    • Albumin 09/19/2020 4 0    • Total Bilirubin 09/19/2020 0 50    • eGFR 09/19/2020 125    • Lipase 09/19/2020 72 (L)    • Color, UA 09/19/2020 Yellow    • Clarity, UA 09/19/2020 Clear    • Specific Gravity, UA 09/19/2020 1 010    • pH, UA 09/19/2020 6 5    • Leukocytes, UA 09/19/2020 Negative    • Nitrite, UA 09/19/2020 Negative    • Protein, UA 09/19/2020 Negative    • Glucose, UA 09/19/2020 Negative    • Ketones, UA 09/19/2020 Negative    • Urobilinogen, UA 09/19/2020 0 2    • Bilirubin, UA 09/19/2020 Negative    • Occult Blood, UA 09/19/2020 Negative      I have personally reviewed all pertinent laboratory/tests results  Suicide/Homicide Risk Assessment:    Risk of Harm to Self:  Protective Factors: no current suicidal ideation, access to mental health treatment, compliant with medications, compliant with mental health treatment, having a desire to be alive, having a sense of purpose or meaning in life, impulse control, resiliency  Based on today's assessment, Vanna Paniagua presents the following risk of harm to self: minimal    Risk of Harm to Others:  Based on today's assessment, Vanna Paniagua presents the following risk of harm to others: minimal    The following interventions are recommended: referral for psychotherapy    Medications Risks/Benefits:      Risks, Benefits And Possible Side Effects Of Medications:    Discussed risks and benefits of treatment with patient including risk of suicidality, serotonin syndrome, increased QTc interval and SIADH related to treatment with antidepressants; Risk of induction of manic symptoms in certain patient populations and risk of parkinsonian symptoms, metabolic syndrome, tardive dyskinesia and neuroleptic malignant syndrome related to treatment with antipsychotic medications     Controlled Medication Discussion:     Not applicable    Treatment Plan:    Due for update/Updated:   yes  Last treatment plan done 3/4/22 by warner hale  Treatment Plan due on 9/4/22      HOLGER Del Real 02/06/23  Visit Time    Visit Start Time: 598  Visit Stop Time: 5021  Total Visit Duration: 20 minutes

## 2023-03-06 DIAGNOSIS — G47.00 INSOMNIA, UNSPECIFIED TYPE: ICD-10-CM

## 2023-03-06 DIAGNOSIS — F41.1 GENERALIZED ANXIETY DISORDER: ICD-10-CM

## 2023-03-06 RX ORDER — SERTRALINE HYDROCHLORIDE 100 MG/1
TABLET, FILM COATED ORAL
Qty: 30 TABLET | Refills: 0 | Status: SHIPPED | OUTPATIENT
Start: 2023-03-06

## 2023-03-06 RX ORDER — QUETIAPINE FUMARATE 100 MG/1
100 TABLET, FILM COATED ORAL
Qty: 30 TABLET | Refills: 0 | Status: SHIPPED | OUTPATIENT
Start: 2023-03-06

## 2023-04-05 ENCOUNTER — OFFICE VISIT (OUTPATIENT)
Dept: PSYCHIATRY | Facility: CLINIC | Age: 23
End: 2023-04-05

## 2023-04-05 DIAGNOSIS — F33.1 MODERATE EPISODE OF RECURRENT MAJOR DEPRESSIVE DISORDER (HCC): Primary | ICD-10-CM

## 2023-04-05 DIAGNOSIS — G47.00 INSOMNIA, UNSPECIFIED TYPE: ICD-10-CM

## 2023-04-05 DIAGNOSIS — F41.1 GENERALIZED ANXIETY DISORDER: ICD-10-CM

## 2023-04-05 RX ORDER — SERTRALINE HYDROCHLORIDE 100 MG/1
200 TABLET, FILM COATED ORAL DAILY
Qty: 30 TABLET | Refills: 0
Start: 2023-04-05

## 2023-04-05 RX ORDER — TRAZODONE HYDROCHLORIDE 50 MG/1
50 TABLET ORAL
Qty: 90 TABLET | Refills: 0 | Status: SHIPPED | OUTPATIENT
Start: 2023-04-05

## 2023-04-05 NOTE — PSYCH
Regular Visit    Problem List Items Addressed This Visit        Other    Generalized anxiety disorder    Relevant Medications    sertraline (ZOLOFT) 100 mg tablet    traZODone (DESYREL) 50 mg tablet    cariprazine (Vraylar) 1 5 MG capsule   Other Visit Diagnoses     Moderate episode of recurrent major depressive disorder ([facility] [address] )    -  Primary    Relevant Medications    sertraline (ZOLOFT) 100 mg tablet    traZODone (DESYREL) 50 mg tablet    cariprazine (Vraylar) 1 5 MG capsule    Insomnia, unspecified type        Relevant Medications    traZODone (DESYREL) 50 mg tablet             Encounter provider HOLGER Howe    Provider located at    [address] [place] [address]  [address] [facility] [address] [phone]    Recent Visits  No visits were found meeting these conditions  Showing recent visits within past 7 days and meeting all other requirements  Today's Visits  Date Type Provider Dept   04/05/23 Office Visit [facility]OLGER Howe  [facility]   Showing today's visits and meeting all other requirements  Future Appointments  No visits were found meeting these conditions    Showing future appointments within next 150 days and meeting all other requirements       HPI     Current Outpatient Medications   Medication Sig Dispense Refill   • acetaminophen (TYLENOL) 325 mg tablet Take 650 mg by mouth every 6 (six) hours as needed for mild pain     • cariprazine (Vraylar) 1 5 MG capsule Take 1 capsule (1 5 mg total) by mouth daily 30 capsule 1   • hydrOXYzine pamoate (VISTARIL) 50 mg capsule Take 1 capsule (50 mg total) by mouth 3 (three) times a day as needed for anxiety 180 capsule 0   • meclizine (ANTIVERT) 25 mg tablet Take 1 tablet (25 mg total) by mouth every 8 (eight) hours as needed for dizziness or nausea 30 tablet 0   • omeprazole (PriLOSEC) 10 mg delayed release capsule Take 10 mg by mouth daily     • omeprazole (PriLOSEC) 40 MG capsule      • sertraline (ZOLOFT) 100 mg tablet Take 2 tablets (200 mg total) by mouth daily 30 tablet 0   • traZODone (DESYREL) 50 mg tablet Take 1 tablet (50 mg total) by mouth daily at bedtime 90 tablet 0     No current facility-administered medications for this visit  Review of Systems        MEDICATION MANAGEMENT NOTE        [facility]    Name and Date of Birth:  Zach Figueroa 25 y o  2000 MRN: [id number]    Date of Visit: April 5, 2023    No Known Allergies  SUBJECTIVE:    Pau Moran is seen today for a follow up for Major Depressive Disorder, Generalized Anxiety Disorder and insomnia  He continues to do fairly well since the last visit  Patient shares he recently had a panic attack which brought him to the ER, however once he went to the ER symptoms subsided and he left without getting treatment  Unable to state what provoked the extreme anxiety levels besides that he did not have the prn Atarax on hand which is effective for managing his panic symptoms  Continues to struggle with 6/10 depression with periods of hopelessness, helplessness, worry about his future, negative thought processes  Will initiate Vraylar 1 5 mg for treatment resistant depression and augmentation of Zoloft 200 mg daily, side effects explained and patient agreed with the plan  Continues to experience occasional passive SI when he gets frustrated with his girlfriend's schedule or his work  His girlfriend works a lot and patient appears to start feeling down when he does not get to see her  Denies any suicidal ideation with  plan and agreeable to go to the ER immediately if this ever occurs  Gets easily overwhelmed with external stressors, hopeful [address] will aid with mood control and dysphoria  Seroquel discontinued, initiate trazodone 50 mg at bedtime for difficulty initiating sleep    Continue to encourage healthy diet and regular exercise, patient currently weighs 246 akhil, has yet to complete pending blood work despite reminders  Continues to be on psychotherapy wait list  Denies SI  He denies any side effects from medications  PLAN:    -Continue Zoloft to 200 mg daily for depressive and anxiety symptoms PARQ completed including serotonin syndrome, SIADH, worsening depression, suicidality, induction of aron, GI upset, headaches, activation, sexual side effects, sedation, potential drug interactions, and others     -Initiate Vraylar 1 5mg for treatment resistant depression and augmentation of zoloft    -Continue prn Atarax 50mg daily for breakthrough anxiety     -D/c Seroquel for insomnia, initiate trazodone 50 mg at bedtime for insomnia  complete pending lab work for next visit        Aware of 24 hour and weekend coverage for urgent situations accessed by calling [facility] main practice number  Referral for individual psychotherapy    Diagnoses and all orders for this visit:    Moderate episode of recurrent major depressive disorder (HCC)  -     cariprazine (Vraylar) 1 5 MG capsule; Take 1 capsule (1 5 mg total) by mouth daily    Generalized anxiety disorder  -     sertraline (ZOLOFT) 100 mg tablet; Take 2 tablets (200 mg total) by mouth daily    Insomnia, unspecified type  -     traZODone (DESYREL) 50 mg tablet;  Take 1 tablet (50 mg total) by mouth daily at bedtime        Current Outpatient Medications on File Prior to Visit   Medication Sig Dispense Refill   • acetaminophen (TYLENOL) 325 mg tablet Take 650 mg by mouth every 6 (six) hours as needed for mild pain     • hydrOXYzine pamoate (VISTARIL) 50 mg capsule Take 1 capsule (50 mg total) by mouth 3 (three) times a day as needed for anxiety 180 capsule 0   • meclizine (ANTIVERT) 25 mg tablet Take 1 tablet (25 mg total) by mouth every 8 (eight) hours as needed for dizziness or nausea 30 tablet 0   • omeprazole (PriLOSEC) 10 mg delayed release capsule Take 10 mg by mouth daily     • omeprazole (PriLOSEC) 40 MG capsule      • [DISCONTINUED] QUEtiapine (SEROquel) 100 mg tablet Take 1 tablet (100 mg total) by mouth daily at bedtime 30 tablet 0   • [DISCONTINUED] sertraline (ZOLOFT) 100 mg tablet Take 1 tablet (100 mg total) by mouth daily 30 tablet 0   • [DISCONTINUED] traZODone (DESYREL) 50 mg tablet Take 1 tablet (50 mg total) by mouth daily at bedtime 90 tablet 0     No current facility-administered medications on file prior to visit  HPI ROS Appetite Changes and Sleep:     He reports difficulty falling asleep, adequate appetite, adequate energy level   Denies homicidal ideation, denies suicidal ideation    Review Of Systems:      HPI ROS:               Medication Side Effects:  denies    Depression (10 worst): 5/10    Anxiety (10 worst): 6/10    Safety concerns (SI, HI, etc): Denies si and hi    Sleep:  7 hours    Energy:  Adequate    Appetite:  3 meals daily    Weight Change:  Denies, current to 246 pounds        Mental Status Evaluation:    Appearance Adequate hygiene and grooming   Behavior calm and cooperative   Mood anxious and depressed  Depression Scale - 5 of 10 (0 = No depression)  Anxiety Scale - 6 of 10 (0 = No anxiety)   Speech Normal rate and volume   Affect mood-congruent   Thought Processes Goal directed and coherent   Thought Content Does not verbalize delusional material   Associations Tightly connected   Perceptual Disturbances Denies hallucinations and does not appear to be responding to internal stimuli   Risk Potential Suicidal/Homicidal Ideation - No evidence of suicidal or homicidal ideation and patient does not verbalize suicidal or homicidal ideation  Risk of Violence - No evidence of risk for violence found on assessment  Risk of Self Mutilation - No evidence of risk for self mutilation found on assessment   Orientation oriented to person, place, time/date and situation   Memory recent and remote memory grossly intact   Consciousness alert and awake Attention/Concentration attention span and concentration are age appropriate   Insight intact   Judgement intact   Muscle Strength and Gait normal muscle strength and normal muscle tone, normal gait/station and normal balance   Motor Activity no abnormal movements   Language no difficulty naming common objects, no difficulty repeating a phrase, no difficulty writing a sentence   Fund of Knowledge adequate knowledge of current events  adequate fund of knowledge regarding past history  adequate fund of knowledge regarding vocabulary      Traumatic History Update:     New Onset of Abuse Since Last Encounter:   no  Traumatic Events Since Last Encounter:   no    Past Medical History:    History reviewed  No pertinent past medical history  History reviewed  No pertinent surgical history    No Known Allergies  Substance Abuse History:    Social History     Substance and Sexual Activity   Alcohol Use Not Currently     Social History     Substance and Sexual Activity   Drug Use Not Currently     Social History:    Social History     Socioeconomic History   • Marital status: Single     Spouse name: Not on file   • Number of children: Not on file   • Years of education: Not on file   • Highest education level: Not on file   Occupational History   • Not on file   Tobacco Use   • Smoking status: Former     Years: 3 00     Types: Cigarettes   • Smokeless tobacco: Never   • Tobacco comments:     once monthy   Vaping Use   • Vaping Use: Former   Substance and Sexual Activity   • Alcohol use: Not Currently   • Drug use: Not Currently   • Sexual activity: Not on file   Other Topics Concern   • Not on file   Social History Narrative   • Not on file     Social Determinants of Health     Financial Resource Strain: Not on file   Food Insecurity: Not on file   Transportation Needs: Not on file   Physical Activity: Not on file   Stress: Not on file   Social Connections: Not on file   Intimate Partner Violence: Not on file   Housing Stability: Not on file     Family Psychiatric History:     Family History   Problem Relation Age of Onset   • No Known Problems Mother    • No Known Problems Father      History Review: The following portions of the patient's history were reviewed and updated as appropriate: allergies, current medications, past family history, past medical history, past social history, past surgical history and problem list     OBJECTIVE:     Vital signs in last 24 hours: There were no vitals filed for this visit  Laboratory Results:   Recent Labs (last 2 months):   No visits with results within 2 Month(s) from this visit     Latest known visit with results is:   Admission on 09/19/2020, Discharged on 09/19/2020   Component Date Value   • WBC 09/19/2020 6 48    • RBC 09/19/2020 4 68    • Hemoglobin 09/19/2020 14 6    • Hematocrit 09/19/2020 42 3    • MCV 09/19/2020 90    • MCH 09/19/2020 31 2    • MCHC 09/19/2020 34 5    • RDW 09/19/2020 12 1    • MPV 09/19/2020 10 1    • Platelets 29/51/4529 255    • nRBC 09/19/2020 0    • Neutrophils Relative 09/19/2020 46    • Immat GRANS % 09/19/2020 0    • Lymphocytes Relative 09/19/2020 36    • Monocytes Relative 09/19/2020 15 (H)    • Eosinophils Relative 09/19/2020 2    • Basophils Relative 09/19/2020 1    • Neutrophils Absolute 09/19/2020 3 00    • Immature Grans Absolute 09/19/2020 0 01    • Lymphocytes Absolute 09/19/2020 2 35    • Monocytes Absolute 09/19/2020 0 94    • Eosinophils Absolute 09/19/2020 0 12    • Basophils Absolute 09/19/2020 0 06    • Sodium 09/19/2020 139    • Potassium 09/19/2020 4 2    • Chloride 09/19/2020 103    • CO2 09/19/2020 30    • ANION GAP 09/19/2020 6    • BUN 09/19/2020 12    • Creatinine 09/19/2020 0 85    • Glucose 09/19/2020 92    • Calcium 09/19/2020 9 2    • AST 09/19/2020 37    • ALT 09/19/2020 58    • Alkaline Phosphatase 09/19/2020 104    • Total Protein 09/19/2020 7 7    • Albumin 09/19/2020 4 0    • Total Bilirubin 09/19/2020 0 50    • eGFR 09/19/2020 125    • Lipase 09/19/2020 72 (L)    • Color, UA 09/19/2020 Yellow    • Clarity, UA 09/19/2020 Clear    • Specific Gravity, UA 09/19/2020 1 010    • pH, UA 09/19/2020 6 5    • Leukocytes, UA 09/19/2020 Negative    • Nitrite, UA 09/19/2020 Negative    • Protein, UA 09/19/2020 Negative    • Glucose, UA 09/19/2020 Negative    • Ketones, UA 09/19/2020 Negative    • Urobilinogen, UA 09/19/2020 0 2    • Bilirubin, UA 09/19/2020 Negative    • Occult Blood, UA 09/19/2020 Negative      I have personally reviewed all pertinent laboratory/tests results  Suicide/Homicide Risk Assessment:    Risk of Harm to Self:  Protective Factors: no current suicidal ideation, access to mental health treatment, compliant with medications, compliant with mental health treatment, connection to community, having a desire to be alive, having a sense of purpose or meaning in life, medical compliance, no substance use problems  Based on today's assessment, Sylvester Hodge presents the following risk of harm to self: minimal    Risk of Harm to Others:  Based on today's assessment, Sylvester Hodge presents the following risk of harm to others: none    The following interventions are recommended: referral for psychotherapy    Medications Risks/Benefits:      Risks, Benefits And Possible Side Effects Of Medications:    Discussed risks and benefits of treatment with patient including risk of suicidality, serotonin syndrome, increased QTc interval and SIADH related to treatment with antidepressants; Risk of induction of manic symptoms in certain patient populations and risk of parkinsonian symptoms, metabolic syndrome, tardive dyskinesia and neuroleptic malignant syndrome related to treatment with antipsychotic medications     Controlled Medication Discussion:     Not applicable    Treatment Plan:    Due for update/Updated:   yes  Last treatment plan done 4/5/23 by Adalberto Henning  Treatment Plan due on 10/5/23      HOLGER Scruggs 04/05/23    Visit Time    Visit Start Time: 379  Visit Stop Time: 231  Total Visit Duration: 16 minutes

## 2023-04-05 NOTE — BH TREATMENT PLAN
TREATMENT PLAN (Medication Management Only)        [facility]    Name and Date of Birth:  Venita Michael 25 y o  2000  Date of Treatment Plan: April 5, 2023  Diagnosis/Diagnoses:    1  Moderate episode of recurrent major depressive disorder ([address] )    2  Generalized anxiety disorder    3  Insomnia, unspecified type      Strengths/Personal Resources for Self-Care: supportive family, supportive friends, taking medications as prescribed  Area/Areas of need (in own words): anxiety symptoms, depressive symptoms  1  Long Term Goal: improve control of depression  Target Date:6 months - 10/5/2023  Person/Persons responsible for completion of goal: Corey Ribeiro  Short Term Objective (s) - How will we reach this goal?:   A  Provider new recommended medication/dosage changes and/or continue medication(s): continue current medications as prescribed  B  Attend medication management appointments regularly  C  Take psychiatric medications responsibly  Target Date:6 months - 10/5/2023  Person/Persons Responsible for Completion of Goal: Kevin Burton  Progress Towards Goals: continuing treatment  Treatment Modality: medication management every 2 months  Review due 180 days from date of this plan: 6 months - 10/5/2023  Expected length of service: ongoing treatment  My Physician/PA/NP and I have developed this plan together and I agree to work on the goals and objectives  I understand the treatment goals that were developed for my treatment

## 2023-05-01 ENCOUNTER — OFFICE VISIT (OUTPATIENT)
Dept: FAMILY MEDICINE CLINIC | Facility: CLINIC | Age: 23
End: 2023-05-01

## 2023-05-01 VITALS
TEMPERATURE: 99 F | OXYGEN SATURATION: 95 % | DIASTOLIC BLOOD PRESSURE: 64 MMHG | WEIGHT: 249.2 LBS | HEIGHT: 69 IN | SYSTOLIC BLOOD PRESSURE: 122 MMHG | HEART RATE: 105 BPM | BODY MASS INDEX: 36.91 KG/M2

## 2023-05-01 DIAGNOSIS — K92.0 HEMATEMESIS WITH NAUSEA: ICD-10-CM

## 2023-05-01 DIAGNOSIS — H81.13 BENIGN PAROXYSMAL POSITIONAL VERTIGO DUE TO BILATERAL VESTIBULAR DISORDER: ICD-10-CM

## 2023-05-01 DIAGNOSIS — Z87.11 HISTORY OF STOMACH ULCERS: ICD-10-CM

## 2023-05-01 DIAGNOSIS — K21.9 GASTROESOPHAGEAL REFLUX DISEASE, UNSPECIFIED WHETHER ESOPHAGITIS PRESENT: Primary | ICD-10-CM

## 2023-05-01 RX ORDER — FAMOTIDINE 20 MG/1
20 TABLET, FILM COATED ORAL 2 TIMES DAILY
Qty: 180 TABLET | Refills: 0 | Status: SHIPPED | OUTPATIENT
Start: 2023-05-01

## 2023-05-01 RX ORDER — ONDANSETRON 4 MG/1
4 TABLET, FILM COATED ORAL EVERY 8 HOURS PRN
Qty: 20 TABLET | Refills: 0 | Status: SHIPPED | OUTPATIENT
Start: 2023-05-01

## 2023-05-01 RX ORDER — OMEPRAZOLE 40 MG/1
40 CAPSULE, DELAYED RELEASE ORAL DAILY
Qty: 90 CAPSULE | Refills: 0 | Status: SHIPPED | OUTPATIENT
Start: 2023-05-01

## 2023-05-01 RX ORDER — MECLIZINE HYDROCHLORIDE 25 MG/1
25 TABLET ORAL EVERY 8 HOURS PRN
Qty: 30 TABLET | Refills: 0 | Status: SHIPPED | OUTPATIENT
Start: 2023-05-01

## 2023-05-01 NOTE — PROGRESS NOTES
Name: Nata Chacko      : 2000      MRN: [id number]  Encounter Provider: Wil Smart PA-C  Encounter Date: 2023   Encounter department: [address] [place] [address]     1  Gastroesophageal reflux disease, unspecified whether esophagitis present  Assessment & Plan:  Continue omeprazole  Will add on Pepcid  Recommended he avoid trigger foods  Referral to GI    Orders:  -     omeprazole (PriLOSEC) 40 MG capsule; Take 1 capsule (40 mg total) by mouth daily  -     Ambulatory Referral to Gastroenterology; Future  -     famotidine (PEPCID) 20 mg tablet; Take 1 tablet (20 mg total) by mouth 2 (two) times a day    2  History of stomach ulcers  Assessment & Plan:  Continue omeprazole  Will add on Pepcid  Recommended he avoid trigger foods  Referral to GI    Orders:  -     Ambulatory Referral to Gastroenterology; Future  -     famotidine (PEPCID) 20 mg tablet; Take 1 tablet (20 mg total) by mouth 2 (two) times a day    3  Hematemesis with nausea  Assessment & Plan:  Continue omeprazole  Will add on Pepcid  Recommended he avoid trigger foods  Referral to GI    Script for zofran PRN    Orders:  -     Ambulatory Referral to Gastroenterology; Future  -     ondansetron (ZOFRAN) 4 mg tablet; Take 1 tablet (4 mg total) by mouth every 8 (eight) hours as needed for nausea or vomiting    4  Benign paroxysmal positional vertigo due to bilateral vestibular disorder  Assessment & Plan:  Continue meclizine as needed    Orders:  -     meclizine (ANTIVERT) 25 mg tablet; Take 1 tablet (25 mg total) by mouth every 8 (eight) hours as needed for dizziness or nausea           Javon Robert is a pleasant 25year old male who is here today complaining of nausea and vomiting for the past 1-2 weeks  He admits that he is having difficulty eating because as soon as he starts to eat he vomits  He has been taking his omeprazole  He tried tums, but that does not help   At times, his vomit has some blood tinged mucus in it  He does have a history of stomach ulcers  He denies any coffee ground emesis  He admits that his bowels are normal  He denies any black stools, tarry stools, or diarrhea  He did start [place], but this was started after he started with his GI symptoms  He admits that the abdominal pain is generalized  Review of Systems   Constitutional: Negative for chills, diaphoresis, fatigue and fever  HENT: Negative for congestion, ear pain, postnasal drip, rhinorrhea, sneezing, sore throat and trouble swallowing  Eyes: Negative for pain and visual disturbance  Respiratory: Negative for apnea, cough, shortness of breath and wheezing  Cardiovascular: Negative for chest pain and palpitations  Gastrointestinal: Positive for abdominal pain, nausea and vomiting  Negative for constipation and diarrhea  Genitourinary: Negative for dysuria and hematuria  Musculoskeletal: Negative for arthralgias, gait problem and myalgias  Neurological: Negative for dizziness, syncope, weakness, light-headedness, numbness and headaches  Psychiatric/Behavioral: Negative for suicidal ideas  The patient is not nervous/anxious          Current Outpatient Medications on File Prior to Visit   Medication Sig    cariprazine (Vraylar) 1 5 MG capsule Take 1 capsule (1 5 mg total) by mouth daily    hydrOXYzine pamoate (VISTARIL) 50 mg capsule Take 1 capsule (50 mg total) by mouth 3 (three) times a day as needed for anxiety    sertraline (ZOLOFT) 100 mg tablet Take 2 tablets (200 mg total) by mouth daily    traZODone (DESYREL) 50 mg tablet Take 1 tablet (50 mg total) by mouth daily at bedtime    [DISCONTINUED] omeprazole (PriLOSEC) 40 MG capsule     [DISCONTINUED] acetaminophen (TYLENOL) 325 mg tablet Take 650 mg by mouth every 6 (six) hours as needed for mild pain    [DISCONTINUED] meclizine (ANTIVERT) 25 mg tablet Take 1 tablet (25 mg total) by mouth every 8 (eight) hours as needed for dizziness or nausea (Patient not taking: "Reported on 5/1/2023)    [DISCONTINUED] omeprazole (PriLOSEC) 10 mg delayed release capsule Take 10 mg by mouth daily       Objective     /64   Pulse 105   Temp 99 °F (37 2 °C)   Ht 5' 9\" (1 753 m)   Wt 113 kg (249 lb 3 2 oz)   SpO2 95%   BMI 36 80 kg/m²     Physical Exam  Vitals and nursing note reviewed  Constitutional:       Appearance: He is well-developed  HENT:      Head: Normocephalic and atraumatic  Right Ear: External ear normal       Left Ear: External ear normal       Nose: Nose normal       Mouth/Throat:      Pharynx: No oropharyngeal exudate or posterior oropharyngeal erythema  Eyes:      Extraocular Movements: Extraocular movements intact  Cardiovascular:      Rate and Rhythm: Normal rate and regular rhythm  Heart sounds: Normal heart sounds  No murmur heard  No friction rub  No gallop  Pulmonary:      Effort: Pulmonary effort is normal  No respiratory distress  Breath sounds: Normal breath sounds  No wheezing or rales  Abdominal:      General: Bowel sounds are normal       Palpations: Abdomen is soft  Tenderness: There is abdominal tenderness (mild LUQ, RUQ, and epigastric)  There is no guarding or rebound  Musculoskeletal:         General: Normal range of motion  Cervical back: Normal range of motion and neck supple  Skin:     General: Skin is warm and dry  Neurological:      Mental Status: He is alert and oriented to person, place, and time  Psychiatric:         Behavior: Behavior normal          Thought Content:  Thought content normal          Judgment: Judgment normal        Christen Mccloud PA-C  "

## 2023-05-02 ENCOUNTER — HOSPITAL ENCOUNTER (EMERGENCY)
Facility: HOSPITAL | Age: 23
Discharge: HOME | End: 2023-05-02
Attending: EMERGENCY MEDICINE

## 2023-05-02 ENCOUNTER — APPOINTMENT (EMERGENCY)
Dept: CT IMAGING | Facility: HOSPITAL | Age: 23
End: 2023-05-02

## 2023-05-02 ENCOUNTER — TELEPHONE (OUTPATIENT)
Dept: FAMILY MEDICINE CLINIC | Facility: CLINIC | Age: 23
End: 2023-05-02

## 2023-05-02 VITALS
TEMPERATURE: 97.7 F | DIASTOLIC BLOOD PRESSURE: 85 MMHG | OXYGEN SATURATION: 96 % | HEART RATE: 77 BPM | RESPIRATION RATE: 16 BRPM | SYSTOLIC BLOOD PRESSURE: 142 MMHG

## 2023-05-02 DIAGNOSIS — R10.9 ABDOMINAL PAIN: Primary | ICD-10-CM

## 2023-05-02 DIAGNOSIS — R11.2 NAUSEA AND VOMITING: ICD-10-CM

## 2023-05-02 LAB
ALBUMIN SERPL BCP-MCNC: 4.4 G/DL (ref 3.5–5)
ALP SERPL-CCNC: 84 U/L (ref 34–104)
ALT SERPL W P-5'-P-CCNC: 46 U/L (ref 7–52)
ANION GAP SERPL CALCULATED.3IONS-SCNC: 10 MMOL/L (ref 4–13)
APTT PPP: 29 SECONDS (ref 23–37)
AST SERPL W P-5'-P-CCNC: 28 U/L (ref 13–39)
BASOPHILS # BLD AUTO: 0.06 THOUSANDS/ÂΜL (ref 0–0.1)
BASOPHILS NFR BLD AUTO: 1 % (ref 0–1)
BILIRUB SERPL-MCNC: 0.48 MG/DL (ref 0.2–1)
BUN SERPL-MCNC: 17 MG/DL (ref 5–25)
CALCIUM SERPL-MCNC: 9.7 MG/DL (ref 8.4–10.2)
CHLORIDE SERPL-SCNC: 100 MMOL/L (ref 96–108)
CO2 SERPL-SCNC: 27 MMOL/L (ref 21–32)
CREAT SERPL-MCNC: 0.94 MG/DL (ref 0.6–1.3)
EOSINOPHIL # BLD AUTO: 0.1 THOUSAND/ÂΜL (ref 0–0.61)
EOSINOPHIL NFR BLD AUTO: 2 % (ref 0–6)
ERYTHROCYTE [DISTWIDTH] IN BLOOD BY AUTOMATED COUNT: 12.1 % (ref 11.6–15.1)
GFR SERPL CREATININE-BSD FRML MDRD: 114 ML/MIN/1.73SQ M
GLUCOSE SERPL-MCNC: 91 MG/DL (ref 65–140)
HCT VFR BLD AUTO: 45 % (ref 36.5–49.3)
HGB BLD-MCNC: 16 G/DL (ref 12–17)
IMM GRANULOCYTES # BLD AUTO: 0.01 THOUSAND/UL (ref 0–0.2)
IMM GRANULOCYTES NFR BLD AUTO: 0 % (ref 0–2)
INR PPP: 0.97 (ref 0.84–1.19)
LIPASE SERPL-CCNC: 25 U/L (ref 11–82)
LYMPHOCYTES # BLD AUTO: 2.17 THOUSANDS/ÂΜL (ref 0.6–4.47)
LYMPHOCYTES NFR BLD AUTO: 37 % (ref 14–44)
MCH RBC QN AUTO: 31.3 PG (ref 26.8–34.3)
MCHC RBC AUTO-ENTMCNC: 35.6 G/DL (ref 31.4–37.4)
MCV RBC AUTO: 88 FL (ref 82–98)
MONOCYTES # BLD AUTO: 0.58 THOUSAND/ÂΜL (ref 0.17–1.22)
MONOCYTES NFR BLD AUTO: 10 % (ref 4–12)
NEUTROPHILS # BLD AUTO: 2.96 THOUSANDS/ÂΜL (ref 1.85–7.62)
NEUTS SEG NFR BLD AUTO: 50 % (ref 43–75)
NRBC BLD AUTO-RTO: 0 /100 WBCS
PLATELET # BLD AUTO: 272 THOUSANDS/UL (ref 149–390)
PMV BLD AUTO: 10.4 FL (ref 8.9–12.7)
POTASSIUM SERPL-SCNC: 4 MMOL/L (ref 3.5–5.3)
PROT SERPL-MCNC: 7.2 G/DL (ref 6.4–8.4)
PROTHROMBIN TIME: 13 SECONDS (ref 11.6–14.5)
RBC # BLD AUTO: 5.12 MILLION/UL (ref 3.88–5.62)
SODIUM SERPL-SCNC: 137 MMOL/L (ref 135–147)
WBC # BLD AUTO: 5.88 THOUSAND/UL (ref 4.31–10.16)

## 2023-05-02 RX ORDER — ONDANSETRON 2 MG/ML
4 INJECTION INTRAMUSCULAR; INTRAVENOUS ONCE
Status: COMPLETED | OUTPATIENT
Start: 2023-05-02 | End: 2023-05-02

## 2023-05-02 RX ORDER — FAMOTIDINE 10 MG/ML
20 INJECTION, SOLUTION INTRAVENOUS ONCE
Status: COMPLETED | OUTPATIENT
Start: 2023-05-02 | End: 2023-05-02

## 2023-05-02 RX ORDER — SODIUM CHLORIDE, SODIUM GLUCONATE, SODIUM ACETATE, POTASSIUM CHLORIDE, MAGNESIUM CHLORIDE, SODIUM PHOSPHATE, DIBASIC, AND POTASSIUM PHOSPHATE .53; .5; .37; .037; .03; .012; .00082 G/100ML; G/100ML; G/100ML; G/100ML; G/100ML; G/100ML; G/100ML
1000 INJECTION, SOLUTION INTRAVENOUS ONCE
Status: COMPLETED | OUTPATIENT
Start: 2023-05-02 | End: 2023-05-02

## 2023-05-02 RX ORDER — PANTOPRAZOLE SODIUM 40 MG/10ML
40 INJECTION, POWDER, LYOPHILIZED, FOR SOLUTION INTRAVENOUS ONCE
Status: COMPLETED | OUTPATIENT
Start: 2023-05-02 | End: 2023-05-02

## 2023-05-02 RX ADMIN — FAMOTIDINE 20 MG: 10 INJECTION, SOLUTION INTRAVENOUS at 12:27

## 2023-05-02 RX ADMIN — IOHEXOL 100 ML: 350 INJECTION, SOLUTION INTRAVENOUS at 13:05

## 2023-05-02 RX ADMIN — PANTOPRAZOLE SODIUM 40 MG: 40 INJECTION, POWDER, FOR SOLUTION INTRAVENOUS at 12:26

## 2023-05-02 RX ADMIN — ONDANSETRON 4 MG: 2 INJECTION INTRAMUSCULAR; INTRAVENOUS at 12:27

## 2023-05-02 RX ADMIN — SODIUM CHLORIDE, SODIUM GLUCONATE, SODIUM ACETATE, POTASSIUM CHLORIDE, MAGNESIUM CHLORIDE, SODIUM PHOSPHATE, DIBASIC, AND POTASSIUM PHOSPHATE 1000 ML: .53; .5; .37; .037; .03; .012; .00082 INJECTION, SOLUTION INTRAVENOUS at 12:26

## 2023-05-02 NOTE — Clinical Note
Mignon Shellurs was seen and treated in our emergency department on 5/2/2023  Diagnosis:     Elaina Wong    He may return on this date: 05/04/2023         If you have any questions or concerns, please don't hesitate to call        Cassy Knowles PA-C    ______________________________           _______________          _______________  Hospital Representative                              Date                                Time

## 2023-05-02 NOTE — ED PROVIDER NOTES
"History  Chief Complaint   Patient presents with    Vomiting     Patient states for the past 2 week has been vomiting, last week vomit has \"been looking like blood\", also states had a BM last night and this morning and there was blood in his stool  States nauseous when eating, generalized abdominal pain that patient rates a 11/5     25year old male with PMH GERD, stomach ulcers presents for evaluation of abdominal pain  Pt reports he has been having some intermittent issues over the past 2 weeks  He states he was out of his omeprazole for a couple weeks as he wasn't able to get a refill  He recently resumed this  He states he has been intermittent nausea, vomiting which he initially contributed to his acid reflux  He notes he has been seeing some red in his vomit which he notes looks like blood  He notes last night he had a bowel movement and also noted some bright red blood  He was seen by his PCP yesterday  He was referred to GI  He notes an upcoming appt this month to discuss endoscopy  He notes prior scopes about 2 years ago  He states he has had colon polyps in the past as well as being told he has stomach ulcers  Denies fever, chills  Denies chest pain, SOB  Denies cough, congestion  He reports generalized abdominal pain  Does not radiate  No reported aggravating or alleviating factors  Denies recent travel  Denies sick contacts  + current smoker  Denies EtOH  OV from PCP yesterday was reviewed  Pepcid was prescribed in addition to his PPI but he notes he hasn't picked up these scripts yet        History provided by:  Patient and medical records   used: No    Vomiting  Chronicity:  New  Recent urination:  Normal  Relieved by:  Nothing  Worsened by:  Nothing  Associated symptoms: abdominal pain    Associated symptoms: no chills, no cough, no diarrhea, no fever, no headaches, no myalgias, no sore throat and no URI    Risk factors: no alcohol use, no diabetes, no prior " abdominal surgery, no sick contacts, no suspect food intake and no travel to endemic areas        Prior to Admission Medications   Prescriptions Last Dose Informant Patient Reported? Taking?   cariprazine (Vraylar) 1 5 MG capsule   No No   Sig: Take 1 capsule (1 5 mg total) by mouth daily   famotidine (PEPCID) 20 mg tablet   No No   Sig: Take 1 tablet (20 mg total) by mouth 2 (two) times a day   hydrOXYzine pamoate (VISTARIL) 50 mg capsule   No No   Sig: Take 1 capsule (50 mg total) by mouth 3 (three) times a day as needed for anxiety   meclizine (ANTIVERT) 25 mg tablet   No No   Sig: Take 1 tablet (25 mg total) by mouth every 8 (eight) hours as needed for dizziness or nausea   omeprazole (PriLOSEC) 40 MG capsule   No No   Sig: Take 1 capsule (40 mg total) by mouth daily   ondansetron (ZOFRAN) 4 mg tablet   No No   Sig: Take 1 tablet (4 mg total) by mouth every 8 (eight) hours as needed for nausea or vomiting   sertraline (ZOLOFT) 100 mg tablet   No No   Sig: Take 2 tablets (200 mg total) by mouth daily   traZODone (DESYREL) 50 mg tablet   No No   Sig: Take 1 tablet (50 mg total) by mouth daily at bedtime      Facility-Administered Medications: None       History reviewed  No pertinent past medical history  History reviewed  No pertinent surgical history  Family History   Problem Relation Age of Onset    No Known Problems Mother     No Known Problems Father      I have reviewed and agree with the history as documented  E-Cigarette/Vaping    E-Cigarette Use Former User      E-Cigarette/Vaping Substances     Social History     Tobacco Use    Smoking status: Some Days     Years: 3 00     Types: Cigarettes    Smokeless tobacco: Never    Tobacco comments:     once monthy   Vaping Use    Vaping Use: Former   Substance Use Topics    Alcohol use: Not Currently    Drug use: Not Currently       Review of Systems   Constitutional: Negative  Negative for chills, fatigue and fever  HENT: Negative    Negative for congestion, rhinorrhea and sore throat  Eyes: Negative  Negative for visual disturbance  Respiratory: Negative  Negative for cough, shortness of breath and wheezing  Cardiovascular: Negative  Negative for chest pain, palpitations and leg swelling  Gastrointestinal: Positive for abdominal pain, blood in stool, nausea and vomiting  Negative for constipation and diarrhea  Genitourinary: Negative  Negative for dysuria, flank pain, frequency and hematuria  Musculoskeletal: Negative  Negative for back pain, myalgias and neck pain  Skin: Negative  Negative for rash  Neurological: Negative  Negative for dizziness, light-headedness, numbness and headaches  Psychiatric/Behavioral: Negative  All other systems reviewed and are negative  Physical Exam  Physical Exam  Vitals and nursing note reviewed  Exam conducted with a chaperone present  Constitutional:       General: He is awake  He is not in acute distress  Appearance: Normal appearance  He is well-developed  He is obese  He is not toxic-appearing or diaphoretic  HENT:      Head: Normocephalic and atraumatic  Right Ear: Hearing and external ear normal       Left Ear: Hearing and external ear normal       Nose: Nose normal       Mouth/Throat:      Mouth: Mucous membranes are moist       Pharynx: Oropharynx is clear  Uvula midline  Eyes:      General: Lids are normal  No scleral icterus  Conjunctiva/sclera: Conjunctivae normal    Neck:      Trachea: Trachea and phonation normal    Cardiovascular:      Rate and Rhythm: Normal rate and regular rhythm  Pulses: Normal pulses  Heart sounds: Normal heart sounds, S1 normal and S2 normal  No murmur heard  Pulmonary:      Effort: Pulmonary effort is normal  No tachypnea or respiratory distress  Breath sounds: Normal breath sounds  No wheezing, rhonchi or rales  Abdominal:      General: Bowel sounds are normal  There is no distension        Palpations: Abdomen is soft  Tenderness: There is generalized abdominal tenderness (mild)  There is no right CVA tenderness, left CVA tenderness, guarding or rebound  Genitourinary:     Rectum: Guaiac result negative  No tenderness or anal fissure  Normal anal tone  Musculoskeletal:      Right lower leg: No edema  Left lower leg: No edema  Skin:     General: Skin is warm and dry  Capillary Refill: Capillary refill takes less than 2 seconds  Findings: No rash  Neurological:      General: No focal deficit present  Mental Status: He is alert and oriented to person, place, and time  GCS: GCS eye subscore is 4  GCS verbal subscore is 5  GCS motor subscore is 6  Gait: Gait normal    Psychiatric:         Mood and Affect: Mood normal          Speech: Speech normal          Behavior: Behavior normal  Behavior is cooperative           Vital Signs  ED Triage Vitals [05/02/23 1122]   Temperature Pulse Respirations Blood Pressure SpO2   97 7 °F (36 5 °C) 87 18 142/85 95 %      Temp Source Heart Rate Source Patient Position - Orthostatic VS BP Location FiO2 (%)   Temporal Monitor Sitting Right arm --      Pain Score       6           Vitals:    05/02/23 1122 05/02/23 1400 05/02/23 1445   BP: 142/85     Pulse: 87 80 77   Patient Position - Orthostatic VS: Sitting           Visual Acuity      ED Medications  Medications   multi-electrolyte (ISOLYTE-S PH 7 4) bolus 1,000 mL (0 mL Intravenous Stopped 5/2/23 1326)   Famotidine (PF) (PEPCID) injection 20 mg (20 mg Intravenous Given 5/2/23 1227)   pantoprazole (PROTONIX) injection 40 mg (40 mg Intravenous Given 5/2/23 1226)   ondansetron (ZOFRAN) injection 4 mg (4 mg Intravenous Given 5/2/23 1227)   iohexol (OMNIPAQUE) 350 MG/ML injection (SINGLE-DOSE) 100 mL (100 mL Intravenous Given 5/2/23 1305)       Diagnostic Studies  Results Reviewed     Procedure Component Value Units Date/Time    Comprehensive metabolic panel [[id number]] Collected: 05/02/23 1211    Lab Status: Final result Specimen: Blood from Arm, Left Updated: 05/02/23 1235     Sodium 137 mmol/L      Potassium 4 0 mmol/L      Chloride 100 mmol/L      CO2 27 mmol/L      ANION GAP 10 mmol/L      BUN 17 mg/dL      Creatinine 0 94 mg/dL      Glucose 91 mg/dL      Calcium 9 7 mg/dL      AST 28 U/L      ALT 46 U/L      Alkaline Phosphatase 84 U/L      Total Protein 7 2 g/dL      Albumin 4 4 g/dL      Total Bilirubin 0 48 mg/dL      eGFR 114 ml/min/1 73sq m     Narrative:      [place] guidelines for Chronic Kidney Disease (CKD):     Stage 1 with normal or high GFR (GFR > 90 mL/min/1 73 square meters)    Stage 2 Mild CKD (GFR = 60-89 mL/min/1 73 square meters)    Stage 3A Moderate CKD (GFR = 45-59 mL/min/1 73 square meters)    Stage 3B Moderate CKD (GFR = 30-44 mL/min/1 73 square meters)    Stage 4 Severe CKD (GFR = 15-29 mL/min/1 73 square meters)    Stage 5 End Stage CKD (GFR <15 mL/min/1 73 square meters)  Note: GFR calculation is accurate only with a steady state creatinine    Lipase [[id number]]  (Normal) Collected: 05/02/23 1211    Lab Status: Final result Specimen: Blood from Arm, Left Updated: 05/02/23 1235     Lipase 25 u/L     Protime-INR [[id number]]  (Normal) Collected: 05/02/23 1211    Lab Status: Final result Specimen: Blood from Arm, Left Updated: 05/02/23 1230     Protime 13 0 seconds      INR 0 97    APTT [[id number]]  (Normal) Collected: 05/02/23 1211    Lab Status: Final result Specimen: Blood from Arm, Left Updated: 05/02/23 1230     PTT 29 seconds     CBC and differential [[id number]] Collected: 05/02/23 1211    Lab Status: Final result Specimen: Blood from Arm, Left Updated: 05/02/23 1219     WBC 5 88 Thousand/uL      RBC 5 12 Million/uL      Hemoglobin 16 0 g/dL      Hematocrit 45 0 %      MCV 88 fL      MCH 31 3 pg      MCHC 35 6 g/dL      RDW 12 1 %      MPV 10 4 fL      Platelets 038 Thousands/uL      nRBC 0 /100 WBCs      Neutrophils Relative 50 %      Immat GRANS % 0 %      Lymphocytes Relative 37 %      Monocytes Relative 10 %      Eosinophils Relative 2 %      Basophils Relative 1 %      Neutrophils Absolute 2 96 Thousands/µL      Immature Grans Absolute 0 01 Thousand/uL      Lymphocytes Absolute 2 17 Thousands/µL      Monocytes Absolute 0 58 Thousand/µL      Eosinophils Absolute 0 10 Thousand/µL      Basophils Absolute 0 06 Thousands/µL                  CT abdomen pelvis with contrast   Final Result by Gerhardt Hands, MD (05/02 1424)      No acute findings  Workstation performed: [id number]                    Procedures  Procedures         ED Course  ED Course as of 05/02/23 1607   Tue May 02, 2023   1135 Prior records reviewed  Was seen by PCP yesterday  On omeprazole, addition of pepcid but pt did not start taking yet  Referral to GI placed  1153 Hemoccult testing performed with assistance of [facility] RN as chaperone  Brown stool noted on exam, no noted melena or hematochezia  Hemoccult negative on bedside testing, controls intact  1220 WBC: 5 88   1220 Hemoglobin: 16 0  No noted anemia, Hgb higher than prior values   1220 Platelet Count: 809   1232 POCT INR: 0 97   1232 PROTIME: 13 0   1232 PTT: 29   1237 Glucose, Random: 91   1237 Creatinine: 0 94   1237 BUN: 17   1237 Sodium: 137   1237 Potassium: 4 0   1237 Chloride: 100   1237 CO2: 27   1237 Anion Gap: 10   1237 Calcium: 9 7   1237 AST: 28   1237 ALT: 46   1237 Alkaline Phosphatase: 84   1237 Total Protein: 7 2   1237 Albumin: 4 4   1237 TOTAL BILIRUBIN: 0 48   1237 eGFR: 114   1237 Lipase: 25   1427 CT abdomen pelvis with contrast  IMPRESSION:     No acute findings      1430 Pt updated on results  Resting comfortably  Nausea and pain is better  No vomiting while here  Overall work up reassuring  I feel pt is appropriate for discharge and outpatient follow up with GI to further evaluate  Already has appt scheduled with GI which I recommended he keep as scheduled       Reviewed dietary guidance for GERD  Continue PPI and pepcid  Also has Rx for zofran PRN nausea/vomiting  Discussed continued symptomatic/supportive care  Follow up with GI as scheduled  Strict return precautions outlined  Advised outpatient follow up with PCP and GI or return to ER for change in condition as outlined  Pt verbalized understanding and had no further questions  SBIRT 22yo+    Flowsheet Row Most Recent Value   Initial Alcohol Screen: US AUDIT-C     1  How often do you have a drink containing alcohol? 0 Filed at: 05/02/2023 1124   Audit-C Score 0 Filed at: 05/02/2023 1124                    Medical Decision Making  24 yo male with PMH GERD, stomach ulcers presents for evaluation of abdominal pain, vomiting  Reports hematemesis and blood in stool  On PPI  Hemoccult testing performed here negative  No noted melena or hematochezia  There is no umair bleeding  Vitals are stable  He is well appearing  Will establish IV, check labs and perform CT abd/pelv to further evaluate his abdominal pain  Will provide dose of IV pepcid and protonix and fluids  Anticipate need for GI evaluation  Depending on work up, this could potentially be deferred to outpatient as scheduled this month  Work up obtained as noted above  No noted anemia  No leukocytosis  Normal renal function  CT abd/pelv without acute findings  No noted pancreatitis, colitis, diverticulitis, bowel obstruction  Given reassuring labs, negative hemoccult testing, negative imaging, exam and vitals and appearance, pt felt appropriate for discharge home with symptomatic management and outpatient follow up with GI  Advised to continue PPI and pepcid as prescribed  Pt comfortable with plan of care  Please refer to above ER course for further details/discussion        Abdominal pain: acute illness or injury  Nausea and vomiting: acute illness or injury     Details: already has Rx for zofran  Amount and/or Complexity of Data Reviewed  External Data Reviewed: labs and notes  Labs: ordered  Decision-making details documented in ED Course  Radiology: ordered  Decision-making details documented in ED Course  Risk  OTC drugs  Prescription drug management  Disposition  Final diagnoses:   Abdominal pain   Nausea and vomiting     Time reflects when diagnosis was documented in both MDM as applicable and the Disposition within this note     Time User Action Codes Description Comment    5/2/2023  1:18 PM Angelica Parody Add [R10 9] Abdominal pain     5/2/2023  1:18 PM Angelica Parody Add [R11 2] Nausea and vomiting       ED Disposition     ED Disposition   Discharge    Condition   Stable    Date/Time   Tue May 2, 2023  2:34 PM    [address] discharge to home/self care                 Follow-up Information     Follow up With Specialties Details Why Contact Info Additional [facility] Go to   [address] [facility], [place], [place], [address] Emergency Department Emergency Medicine  As needed [place] [phone]  [address] [facility] Emergency Department, [facility], [place], [place], [address]          Discharge Medication List as of 5/2/2023  2:39 PM      CONTINUE these medications which have NOT CHANGED    Details   cariprazine (Vraylar) 1 5 MG capsule Take 1 capsule (1 5 mg total) by mouth daily, Starting Wed 4/5/2023, Normal      famotidine (PEPCID) 20 mg tablet Take 1 tablet (20 mg total) by mouth 2 (two) times a day, Starting Mon 5/1/2023, Normal      hydrOXYzine pamoate (VISTARIL) 50 mg capsule Take 1 capsule (50 mg total) by mouth 3 (three) times a day as needed for anxiety, Starting Mon 2/6/2023, Until Mon 5/1/2023 at 2359, Normal      meclizine (ANTIVERT) 25 mg tablet Take 1 tablet (25 mg total) by mouth every 8 (eight) hours as needed for dizziness or nausea, Starting Mon 5/1/2023, Normal      omeprazole (PriLOSEC) 40 MG capsule Take 1 capsule (40 mg total) by mouth daily, Starting Mon 5/1/2023, Normal      ondansetron (ZOFRAN) 4 mg tablet Take 1 tablet (4 mg total) by mouth every 8 (eight) hours as needed for nausea or vomiting, Starting Mon 5/1/2023, Normal      sertraline (ZOLOFT) 100 mg tablet Take 2 tablets (200 mg total) by mouth daily, Starting Wed 4/5/2023, No Print      traZODone (DESYREL) 50 mg tablet Take 1 tablet (50 mg total) by mouth daily at bedtime, Starting Wed 4/5/2023, Normal             No discharge procedures on file      PDMP Review     None          ED Provider  Electronically Signed by           Candida Moran PA-C  05/02/23 0799

## 2023-05-02 NOTE — DISCHARGE INSTRUCTIONS
Continue your omeprazole and pepcid as prescribed  Zofran as needed for nausea/vomiting  Follow up with GI as scheduled  Return to ER as needed

## 2023-05-02 NOTE — TELEPHONE ENCOUNTER
Pt calling stating that he is vomiting blood and having blood in stools  I sent patient to ER to be further evaluated

## 2023-05-03 ENCOUNTER — TELEPHONE (OUTPATIENT)
Dept: ADMINISTRATIVE | Facility: OTHER | Age: 23
End: 2023-05-03

## 2023-05-03 NOTE — LETTER
[facility]  [address] [facility] [id number]    Date: 05/04/23  [facility]  [address]  [place] [address]    Dear Elaina Wong:                                                                                                                              Thank you for choosing [facility] emergency department for care  Your primary care provider wants to make sure that your ongoing medical care is being addressed  If you require follow up care as a result of your emergency department visit, there are a few things the practice would like you to know  As part of the network's continuing commitment to caring for our patients, we have added more same day appointments and have extended office hours to meet your medical needs  After hours, on-call physicians are available via your primary care provider's main office line  We encourage you to contact our office prior to seeking treatment to discuss your symptoms with the medical staff  Together, we can determine the correct course of action  A majority of non-emergent conditions such as: common cold, flu-like symptoms, fevers, strains/sprains, dislocations, minor burns, cuts and animal bites can be treated at [address] facilities  Diagnostic testing is available at some sites  Of course, if you are experiencing a life threatening medical emergency call 911 or proceed directly to the nearest emergency room  Your nearest [facility] facility is conveniently located at:      [address] [facility] offered at most Care [facility] locations  [place] your spot online at www [email or web address] or on the [facility]    Sincerely,    [facility]  No information on file

## 2023-05-03 NOTE — TELEPHONE ENCOUNTER
Rohini Ulloa    ED Visit Information     Ed visit date: 5-2-2023  Diagnosis Description: vomitting  In Network? Yes Alonza Fleischer  Discharge status: Home  Discharged with meds ? No  Number of ED visits to date: 1  ED Severity:3     Outreach Information    Outreach successful: No 2  Date letter mailed:5-4-23  Date Finalized:5-4-23    Care Coordination    Follow up appointment with pcp: no cancellled 5-3-23  Transportation issues ?  NA    [facility] Outreach    Outreach type: 3 Day Outreach  Emergent necessity warranted by diagnosis: Yes  [facility] PCP: Yes  05/03/2023 11:10 AM EDT by Mello Avila MA 05/03/2023 11:10 AM EDT by GABRIELA Wright (Self) [phone] ([facility])[phone] (Mobile)  [phone] (Mobile)   - No Answer/BusyCommunicated -   05/04/2023 12:44 PM EDT by Mello Avila MA 05/04/2023 12:44 PM EDT by GABRIELA Wright (Self) [phone] ([facility])[phone] (Mobile)  [phone] (Mobile) Remove  - No Answer/BusyCommunicated - letter sent

## 2023-05-16 ENCOUNTER — TELEPHONE (OUTPATIENT)
Dept: FAMILY MEDICINE CLINIC | Facility: CLINIC | Age: 23
End: 2023-05-16

## 2023-05-16 ENCOUNTER — CONSULT (OUTPATIENT)
Dept: GASTROENTEROLOGY | Facility: CLINIC | Age: 23
End: 2023-05-16

## 2023-05-16 VITALS
HEART RATE: 110 BPM | TEMPERATURE: 97.6 F | DIASTOLIC BLOOD PRESSURE: 80 MMHG | SYSTOLIC BLOOD PRESSURE: 142 MMHG | BODY MASS INDEX: 36.88 KG/M2 | HEIGHT: 69 IN | WEIGHT: 249 LBS | OXYGEN SATURATION: 95 %

## 2023-05-16 DIAGNOSIS — R10.9 ABDOMINAL CRAMPING: ICD-10-CM

## 2023-05-16 DIAGNOSIS — K21.9 GASTROESOPHAGEAL REFLUX DISEASE, UNSPECIFIED WHETHER ESOPHAGITIS PRESENT: Primary | ICD-10-CM

## 2023-05-16 DIAGNOSIS — K62.5 BRBPR (BRIGHT RED BLOOD PER RECTUM): ICD-10-CM

## 2023-05-16 DIAGNOSIS — K92.0 HEMATEMESIS WITH NAUSEA: ICD-10-CM

## 2023-05-16 RX ORDER — FAMOTIDINE 20 MG/1
40 TABLET, FILM COATED ORAL
Qty: 180 TABLET | Refills: 0 | Status: SHIPPED | OUTPATIENT
Start: 2023-05-16

## 2023-05-16 RX ORDER — SUCRALFATE ORAL 1 G/10ML
1 SUSPENSION ORAL
Qty: 414 ML | Refills: 2 | Status: SHIPPED | OUTPATIENT
Start: 2023-05-16

## 2023-05-16 NOTE — PATIENT INSTRUCTIONS
For now, continue on the omeprazole 40 mg, take famotidine 40 mg in the evening  You can try the sucralfate/Carafate liquid to help coat your esophagus and stomach for added relief  Avoid any NSAIDs and triggers for GERD  As you are able to tolerate, try small frequent oral intake that is bland  We are going to proceed with an upper endoscopy and a colonoscopy because you are seeing blood in her stool as well which is new

## 2023-05-16 NOTE — TELEPHONE ENCOUNTER
Asking for a work note for Saturday - 05/13/2023 -     Left work early on Fri due to vomiting blood & Saturday he was feeling terrible & couldn't go in   Note is only needed for Saturday - Pt was seen by Christin Shankar today     Pt will pull the note off through [address]

## 2023-05-16 NOTE — PROGRESS NOTES
[facility] - Outpatient Consultation  Philip Haines 21 y o  male MRN: [id number]  Encounter: [id number]    ASSESSMENT AND PLAN:      1  Gastroesophageal reflux disease, unspecified whether esophagitis present  2  Hematemesis with nausea     Patient with a history of GERD and LA grade B esophagitis notes approximately 1 month of intractable heartburn, reflux, nausea, emesis, and reported hematemesis after being off of PPI for a few weeks  His symptoms are persisting despite reinitiation of PPI and addition of H2B  No other obvious precipitants noted per review  ER evaluation with contrasted CT scan and serologic testing was unremarkable  We reviewed possible etiologies to include esophagitis, gastritis, peptic ulcer disease, Katherine-Woodward tear, cyclical vomiting, other etiology  Continue on daily PPI and nightly H2B  We will add in sulfate now for added relief of symptoms  Given treatment refractory symptoms and hematemesis (?), plan to proceed with an upper endoscopy at earliest availability  If patient has large-volume bloody emesis he is to report to ER immediately  Current antireflux diet and small frequent meals  If symptoms including nausea and emesis persist, would consider gastric emptying study in the future  Consider additional biliary work-up if symptoms persist and above work-up is negative  The patient was counseled on possible risks of endoscopic procedure to include but not limited to bleeding, infection, and perforation  The patient demonstrates understanding and is in agreement with proceeding with endoscopic evaluation as planned  - Ambulatory Referral to Gastroenterology  - EGD; Future  - famotidine (PEPCID) 20 mg tablet; Take 2 tablets (40 mg total) by mouth daily at bedtime  Dispense: 180 tablet; Refill: 0  - sucralfate (CARAFATE) 1 g/10 mL suspension;  Take 10 mL (1 g total) by mouth 4 (four) times a day (with meals and at bedtime) Dispense: 414 mL; Refill: 2    3  BRBPR (bright red blood per rectum)  4  Abdominal cramping    Patient endorses BRBPR since onset of upper GI symptoms  Contrasted imaging in the emergency department within the past couple of weeks was unremarkable, and at that time rectal examination was guaiac negative  Would doubt brisk upper GI bleeding given hemodynamic stability, stable hemoglobin  Differential diagnosis for BRBPR includes outlet bleeding such as hemorrhoids, fissure, anorectal irritation, though other etiologies are possible including proctitis, IBD, AVMs, diverticular bleeding, etc     Recommend colonoscopy at this time for additional evaluation  If unremarkable, he would not require a colonoscopy until age 39 for sensibilities  While he does have a history of a colon polyp on last colonoscopy in 2020, this was a hyperplastic polyp and was left-sided  - Colonoscopy; Future  - polyethylene glycol (GOLYTELY) 4000 mL solution; Take 4,000 mL by mouth once for 1 dose  Dispense: 4000 mL; Refill: 0    We will follow-up after endoscopic valuation  ______________________________________________________________________    HPI: Patient is a 21 y o  male who presents today for a consultation regarding nausea and hematemesis  Past medical history significant for GERD, generalized anxiety disorder, BPPV  Patient is new to clinic  He shares that over the past couple of weeks, he has developed intractable nausea, emesis, and at times hematemesis  He was previously on omeprazole for several years and was controlling his heartburn and indigestion type symptoms  He ran out of the medication for about month or so and started to develop breakthrough symptoms  He notes frequent burning and regurgitation up into his throat  Notes odynophagia and dysphagia to certain solids  Notes postprandial nausea and emesis, at times hematemesis, maroon and rust colored specks and fluid in his emesis    No coffee-ground emesis  Notes he is trying to keep a bland diet, though despite this is still having nausea and emesis  Tried eating plain toast and even emesis this  Notes early satiety and poor p o  intake  Despite restarting his PPI and adding in famotidine, his symptoms are persistent  Denies any recent illnesses, changes in other maintenance medications, antibiotics, travel, change in diet that he can think may have precipitated his symptoms  He has lost 10 pounds in the past month or so since onset of symptoms  He also notes frequent headaches which can be associated with nausea  He is also noting abdominal cramping as well  Since the onset of his upper GI symptoms, he notes abdominal pain and blood in his stool  He is seeing bright red blood mixed into the stools and in the bowl  No BRBPR on the wipe  No history of hemorrhoids  No melenic stool  No nocturnal BMs  No diarrhea  Denies family history of CRC  He was evaluated in the emergency department in 05/2023  He had a contrasted CT scan of the abdomen pelvis which was negative for any pathology  He had a rectal examination which was guaiac negative  NSAIDs: none   Tobacco: none   Etoh: socially   Cannabis: infrequent      05/2023: CT A/P: no acute findings   05/2023: Hb 16, MCV 88, platelets 043, BUN 17, creatinine 0 94, AST 28, ALT 46, ALP 84, albumin 4 4, T  bili 0 48, INR 0 97    Endoscopic history:   EGD: 06/2019: LA Grade B esophagitis   Colon: 10/2020: Hyperplastic polyp only; normal mucosa    Review of Systems   Constitutional: Negative for fever  Gastrointestinal: Positive for constipation, diarrhea, nausea and vomiting  Genitourinary: Positive for frequency  Negative for dysuria and hematuria  Musculoskeletal: Positive for arthralgias and myalgias  Neurological: Positive for headaches  Otherwise Per HPI    Historical Information   No past medical history on file  No past surgical history on file    Social History   Social History "    Substance and Sexual Activity   Alcohol Use Not Currently     Social History     Substance and Sexual Activity   Drug Use Not Currently     Social History     Tobacco Use   Smoking Status Some Days   • Years: 3 00   • Types: Cigarettes   Smokeless Tobacco Never   Tobacco Comments    once monthy     Family History   Problem Relation Age of Onset   • No Known Problems Mother    • No Known Problems Father        Meds/Allergies       Current Outpatient Medications:   •  cariprazine (Vraylar) 1 5 MG capsule  •  famotidine (PEPCID) 20 mg tablet  •  meclizine (ANTIVERT) 25 mg tablet  •  omeprazole (PriLOSEC) 40 MG capsule  •  ondansetron (ZOFRAN) 4 mg tablet  •  sertraline (ZOLOFT) 100 mg tablet  •  traZODone (DESYREL) 50 mg tablet  •  hydrOXYzine pamoate (VISTARIL) 50 mg capsule    No Known Allergies        Objective     Blood pressure 142/80, pulse (!) 110, temperature 97 6 °F (36 4 °C), temperature source Tympanic, height 5' 9\" (1 753 m), weight 113 kg (249 lb), SpO2 95 %  Body mass index is 36 77 kg/m²  Physical Exam  Vitals and nursing note reviewed  Constitutional:       General: He is not in acute distress  Appearance: Normal appearance  He is not toxic-appearing  HENT:      Head: Normocephalic and atraumatic  Cardiovascular:      Rate and Rhythm: Normal rate  Pulmonary:      Effort: Pulmonary effort is normal  No respiratory distress  Breath sounds: Normal breath sounds  Abdominal:      General: Abdomen is flat  Bowel sounds are normal  There is no distension  Palpations: Abdomen is soft  There is no mass  Tenderness: There is abdominal tenderness  There is no guarding or rebound  Skin:     General: Skin is warm and dry  Coloration: Skin is not jaundiced  Neurological:      General: No focal deficit present  Mental Status: He is alert and oriented to person, place, and time     Psychiatric:         Mood and Affect: Mood normal          Behavior: Behavior normal       " "  Lab Results:   No visits with results within 1 Day(s) from this visit  Latest known visit with results is:   Admission on 05/02/2023, Discharged on 05/02/2023   Component Date Value   • WBC 05/02/2023 5 88    • RBC 05/02/2023 5 12    • Hemoglobin 05/02/2023 16 0    • Hematocrit 05/02/2023 45 0    • MCV 05/02/2023 88    • MCH 05/02/2023 31 3    • MCHC 05/02/2023 35 6    • RDW 05/02/2023 12 1    • MPV 05/02/2023 10 4    • Platelets 12/47/4479 272    • nRBC 05/02/2023 0    • Neutrophils Relative 05/02/2023 50    • Immat GRANS % 05/02/2023 0    • Lymphocytes Relative 05/02/2023 37    • Monocytes Relative 05/02/2023 10    • Eosinophils Relative 05/02/2023 2    • Basophils Relative 05/02/2023 1    • Neutrophils Absolute 05/02/2023 2 96    • Immature Grans Absolute 05/02/2023 0 01    • Lymphocytes Absolute 05/02/2023 2 17    • Monocytes Absolute 05/02/2023 0 58    • Eosinophils Absolute 05/02/2023 0 10    • Basophils Absolute 05/02/2023 0 06    • Sodium 05/02/2023 137    • Potassium 05/02/2023 4 0    • Chloride 05/02/2023 100    • CO2 05/02/2023 27    • ANION GAP 05/02/2023 10    • BUN 05/02/2023 17    • Creatinine 05/02/2023 0 94    • Glucose 05/02/2023 91    • Calcium 05/02/2023 9 7    • AST 05/02/2023 28    • ALT 05/02/2023 46    • Alkaline Phosphatase 05/02/2023 84    • Total Protein 05/02/2023 7 2    • Albumin 05/02/2023 4 4    • Total Bilirubin 05/02/2023 0 48    • eGFR 05/02/2023 114    • Protime 05/02/2023 13 0    • INR 05/02/2023 0 97    • PTT 05/02/2023 29    • Lipase 05/02/2023 25          Radiology Results:   CT abdomen pelvis with contrast    Result Date: 5/2/2023  Narrative: CT ABDOMEN AND PELVIS WITH IV CONTRAST INDICATION:   Nausea/vomiting Abdominal pain, acute, nonlocalized abd pain  \"24 year old male with PMH GERD, stomach ulcers presents for evaluation of abdominal pain  Pt reports he has been having some intermittent issues over the past 2 weeks    He states he was out of his omeprazole for a " "couple weeks as he wasn't able to get a refill  He recently resumed this  He states he has been intermittent nausea, vomiting which he initially contributed to his acid reflux  He notes he has been seeing some red in his vomit which he notes looks like blood  He notes last night he had a bowel movement and also noted some bright red blood\" COMPARISON: CT abdomen pelvis 9/19/2020  TECHNIQUE:  CT examination of the abdomen and pelvis was performed  Multiplanar 2D reformatted images were created from the source data  Radiation dose length product (DLP) for this visit:  1085 mGy-cm   This examination, like all CT scans performed in the [facility], was performed utilizing techniques to minimize radiation dose exposure, including the use of iterative reconstruction and automated exposure control  IV Contrast:  100 mL of iohexol (OMNIPAQUE) Enteric Contrast:  Enteric contrast was not administered  FINDINGS: ABDOMEN LOWER CHEST:  No clinically significant abnormality identified in the visualized lower chest  LIVER/BILIARY TREE:  Unremarkable  GALLBLADDER:  No calcified gallstones  No pericholecystic inflammatory change  SPLEEN:  Unremarkable  PANCREAS:  Unremarkable  ADRENAL GLANDS:  Unremarkable  KIDNEYS/URETERS:  Unremarkable  No hydronephrosis  STOMACH AND BOWEL:  Unremarkable  APPENDIX: Noninflamed  ABDOMINOPELVIC CAVITY:  No ascites  No pneumoperitoneum  No lymphadenopathy  VESSELS:  Unremarkable for patient's age  PELVIS REPRODUCTIVE ORGANS:  Unremarkable for patient's age  URINARY BLADDER:  Unremarkable  ABDOMINAL WALL/INGUINAL REGIONS:  Unremarkable  OSSEOUS STRUCTURES:  No acute fracture or destructive osseous lesion  Reidentified left iliac bone island  Impression: No acute findings  Workstation performed: [id number]       Cristina Jacques PA-C    **Please note:  Dictation voice to text software may have been used in the creation of this record    Occasional wrong word or “sound alike” " substitutions may have occurred due to the inherent limitations of voice recognition software  Read the chart carefully and recognize, using context, where substitutions have occurred  **

## 2023-05-23 ENCOUNTER — OFFICE VISIT (OUTPATIENT)
Dept: PSYCHIATRY | Facility: CLINIC | Age: 23
End: 2023-05-23

## 2023-05-23 DIAGNOSIS — F41.1 GENERALIZED ANXIETY DISORDER: ICD-10-CM

## 2023-05-23 DIAGNOSIS — G47.00 INSOMNIA, UNSPECIFIED TYPE: ICD-10-CM

## 2023-05-23 DIAGNOSIS — F33.1 MODERATE EPISODE OF RECURRENT MAJOR DEPRESSIVE DISORDER (HCC): Primary | ICD-10-CM

## 2023-05-23 RX ORDER — SERTRALINE HYDROCHLORIDE 100 MG/1
200 TABLET, FILM COATED ORAL DAILY
Qty: 60 TABLET | Refills: 2 | Status: SHIPPED | OUTPATIENT
Start: 2023-05-23

## 2023-05-23 NOTE — PSYCH
Regular Visit    Problem List Items Addressed This Visit        Other    Generalized anxiety disorder    Relevant Medications    sertraline (ZOLOFT) 100 mg tablet    cariprazine (Vraylar) 1 5 MG capsule   Other Visit Diagnoses     Moderate episode of recurrent major depressive disorder ([address] )    -  Primary    Relevant Medications    sertraline (ZOLOFT) 100 mg tablet    cariprazine (Vraylar) 1 5 MG capsule    Insomnia, unspecified type                 Encounter provider [facility]    Provider located at    [address] [place] [address] E  [address] [facility] [address] [phone]    Recent Visits  No visits were found meeting these conditions  Showing recent visits within past 7 days and meeting all other requirements  Today's Visits  Date Type Provider Dept   05/23/23 Office Visit [facility]  [facility]   Showing today's visits and meeting all other requirements  Future Appointments  No visits were found meeting these conditions    Showing future appointments within next 150 days and meeting all other requirements       HPI     Current Outpatient Medications   Medication Sig Dispense Refill   • cariprazine (Vraylar) 1 5 MG capsule Take 1 capsule (1 5 mg total) by mouth daily 30 capsule 2   • omeprazole (PriLOSEC) 40 MG capsule Take 1 capsule (40 mg total) by mouth daily 90 capsule 0   • ondansetron (ZOFRAN) 4 mg tablet Take 1 tablet (4 mg total) by mouth every 8 (eight) hours as needed for nausea or vomiting 20 tablet 0   • sertraline (ZOLOFT) 100 mg tablet Take 2 tablets (200 mg total) by mouth daily 60 tablet 2   • traZODone (DESYREL) 50 mg tablet Take 1 tablet (50 mg total) by mouth daily at bedtime 90 tablet 0   • famotidine (PEPCID) 20 mg tablet Take 2 tablets (40 mg total) by mouth daily at bedtime 180 tablet 0   • hydrOXYzine pamoate (VISTARIL) 50 mg capsule Take 1 capsule (50 mg total) by mouth 3 (three) times a day as needed for anxiety 180 capsule 0   • meclizine (ANTIVERT) 25 mg tablet Take 1 tablet (25 mg total) by mouth every 8 (eight) hours as needed for dizziness or nausea 30 tablet 0   • polyethylene glycol (GOLYTELY) 4000 mL solution Take 4,000 mL by mouth once for 1 dose 4000 mL 0   • sucralfate (CARAFATE) 1 g/10 mL suspension Take 10 mL (1 g total) by mouth 4 (four) times a day (with meals and at bedtime) 414 mL 2     No current facility-administered medications for this visit  Review of Systems        MEDICATION MANAGEMENT NOTE        Linch Rayne    Name and Date of Birth:  Rob Vyas 21 y o  2000 MRN: [id number]    Date of Visit: May 23, 2023    No Known Allergies  SUBJECTIVE:    Ralf Cannon is seen today for a follow up for Major Depressive Disorder and Generalized Anxiety Disorder  He continues to improve slowly since the last visit  Reports noticeable improvement in mood symptoms since Vraylar 1 5 mg initiated last visit to augment antidepressant  Specifically reports a more upbeat mood, increased energy, less feelings of hopelessness and despair  Reports only 1 passing moment of fleeting suicidal ideation, denies any intent or plan to hurt self harm  Recently started a new hobby where he planted a garden around his house and is looking forward to maintaining this throughout the summer  Describes how he wanted to start the garden years ago but only found the energy and motivation to do so recently  We will continue Zoloft 200 mg daily, Vraylar 1 5 mg daily, as needed Atarax, trazodone 50 mg at bedtime  Sleeping 7 to 8 hours with the trazodone  No side effects from medication  Continue to encourage healthy diet and regular exercise to work on weight reduction -currently weighs 246 pounds  Continues to be in psychotherapy wait list  Denies SI  He denies any side effects from medications      PLAN:    -Continue Zoloft to 200 mg daily for depressive and anxiety symptoms PARQ completed including serotonin syndrome, SIADH, worsening depression, suicidality, induction of aron, GI upset, headaches, activation, sexual side effects, sedation, potential drug interactions, and others      -Continue Vraylar 1 5mg for treatment resistant depression and augmentation of zoloft     -Continue prn Atarax 50mg daily for breakthrough anxiety     -Continue trazodone 50 mg at bedtime for insomnia  complete pending lab work for next visit  Aware of 24 hour and weekend coverage for urgent situations accessed by calling [facility] main practice number  Referral for individual psychotherapy    Diagnoses and all orders for this visit:    Moderate episode of recurrent major depressive disorder (HCC)  -     cariprazine (Vraylar) 1 5 MG capsule; Take 1 capsule (1 5 mg total) by mouth daily    Generalized anxiety disorder  -     sertraline (ZOLOFT) 100 mg tablet;  Take 2 tablets (200 mg total) by mouth daily    Insomnia, unspecified type        Current Outpatient Medications on File Prior to Visit   Medication Sig Dispense Refill   • omeprazole (PriLOSEC) 40 MG capsule Take 1 capsule (40 mg total) by mouth daily 90 capsule 0   • ondansetron (ZOFRAN) 4 mg tablet Take 1 tablet (4 mg total) by mouth every 8 (eight) hours as needed for nausea or vomiting 20 tablet 0   • traZODone (DESYREL) 50 mg tablet Take 1 tablet (50 mg total) by mouth daily at bedtime 90 tablet 0   • [DISCONTINUED] cariprazine (Vraylar) 1 5 MG capsule Take 1 capsule (1 5 mg total) by mouth daily 30 capsule 1   • [DISCONTINUED] sertraline (ZOLOFT) 100 mg tablet Take 2 tablets (200 mg total) by mouth daily 30 tablet 0   • famotidine (PEPCID) 20 mg tablet Take 2 tablets (40 mg total) by mouth daily at bedtime 180 tablet 0   • hydrOXYzine pamoate (VISTARIL) 50 mg capsule Take 1 capsule (50 mg total) by mouth 3 (three) times a day as needed for anxiety 180 capsule 0   • meclizine (ANTIVERT) 25 mg tablet Take 1 tablet (25 mg total) by mouth every 8 (eight) hours as needed for dizziness or nausea 30 tablet 0   • polyethylene glycol (GOLYTELY) 4000 mL solution Take 4,000 mL by mouth once for 1 dose 4000 mL 0   • sucralfate (CARAFATE) 1 g/10 mL suspension Take 10 mL (1 g total) by mouth 4 (four) times a day (with meals and at bedtime) 414 mL 2     No current facility-administered medications on file prior to visit  HPI ROS Appetite Changes and Sleep:     He reports normal sleep, adequate appetite, adequate energy level   Denies homicidal ideation, denies suicidal ideation    Review Of Systems:      HPI ROS:               Medication Side Effects:  denies     Depression (10 worst): 4/10    Anxiety (10 worst): 4/10    Safety concerns (SI, HI, etc): Denies si and hi    Sleep: 7 hrs with traz    Energy: fair    Appetite: 2-3 meals    Weight Change: 246          Mental Status Evaluation:    Appearance Adequate hygiene and grooming   Behavior calm and cooperative   Mood anxious and depressed  Depression Scale - 4 of 10 (0 = No depression)  Anxiety Scale - 4 of 10 (0 = No anxiety)   Speech Normal rate and volume   Affect mood-congruent   Thought Processes Goal directed and coherent   Thought Content Does not verbalize delusional material   Associations Tightly connected   Perceptual Disturbances Denies hallucinations and does not appear to be responding to internal stimuli   Risk Potential Suicidal/Homicidal Ideation - No evidence of suicidal or homicidal ideation and patient does not verbalize suicidal or homicidal ideation  Risk of Violence - No evidence of risk for violence found on assessment  Risk of Self Mutilation - No evidence of risk for self mutilation found on assessment   Orientation oriented to person, place, time/date and situation   Memory recent and remote memory grossly intact   Consciousness alert and awake   Attention/Concentration attention span and concentration are age appropriate   Insight intact   Judgement intact   Muscle Strength and Gait normal muscle strength and normal muscle tone, normal gait/station and normal balance   Motor Activity no abnormal movements   Language no difficulty naming common objects, no difficulty repeating a phrase, no difficulty writing a sentence   Fund of Knowledge adequate knowledge of current events  adequate fund of knowledge regarding past history  adequate fund of knowledge regarding vocabulary      Traumatic History Update:     New Onset of Abuse Since Last Encounter:   no  Traumatic Events Since Last Encounter:   no    Past Medical History:    History reviewed  No pertinent past medical history  History reviewed  No pertinent surgical history    No Known Allergies  Substance Abuse History:    Social History     Substance and Sexual Activity   Alcohol Use Not Currently     Social History     Substance and Sexual Activity   Drug Use Not Currently     Social History:    Social History     Socioeconomic History   • Marital status: Single     Spouse name: Not on file   • Number of children: Not on file   • Years of education: Not on file   • Highest education level: Not on file   Occupational History   • Not on file   Tobacco Use   • Smoking status: Some Days     Years: 3 00     Types: Cigarettes   • Smokeless tobacco: Never   • Tobacco comments:     once monthy   Vaping Use   • Vaping Use: Former   Substance and Sexual Activity   • Alcohol use: Not Currently   • Drug use: Not Currently   • Sexual activity: Not on file   Other Topics Concern   • Not on file   Social History Narrative   • Not on file     Social Determinants of Health     Financial Resource Strain: Not on file   Food Insecurity: Not on file   Transportation Needs: Not on file   Physical Activity: Not on file   Stress: Not on file   Social Connections: Not on file   Intimate Partner Violence: Not on file   Housing Stability: Not on file     Family Psychiatric History:     Family History   Problem Relation Age of Onset   • No Known Problems Mother    • No Known Problems Father      History Review: The following portions of the patient's history were reviewed and updated as appropriate: allergies, current medications, past family history, past medical history, past social history, past surgical history and problem list     OBJECTIVE:     Vital signs in last 24 hours: There were no vitals filed for this visit  Laboratory Results:   Recent Labs (last 2 months):    Admission on 05/02/2023, Discharged on 05/02/2023   Component Date Value   • WBC 05/02/2023 5 88    • RBC 05/02/2023 5 12    • Hemoglobin 05/02/2023 16 0    • Hematocrit 05/02/2023 45 0    • MCV 05/02/2023 88    • MCH 05/02/2023 31 3    • MCHC 05/02/2023 35 6    • RDW 05/02/2023 12 1    • MPV 05/02/2023 10 4    • Platelets 68/59/5728 272    • nRBC 05/02/2023 0    • Neutrophils Relative 05/02/2023 50    • Immat GRANS % 05/02/2023 0    • Lymphocytes Relative 05/02/2023 37    • Monocytes Relative 05/02/2023 10    • Eosinophils Relative 05/02/2023 2    • Basophils Relative 05/02/2023 1    • Neutrophils Absolute 05/02/2023 2 96    • Immature Grans Absolute 05/02/2023 0 01    • Lymphocytes Absolute 05/02/2023 2 17    • Monocytes Absolute 05/02/2023 0 58    • Eosinophils Absolute 05/02/2023 0 10    • Basophils Absolute 05/02/2023 0 06    • Sodium 05/02/2023 137    • Potassium 05/02/2023 4 0    • Chloride 05/02/2023 100    • CO2 05/02/2023 27    • ANION GAP 05/02/2023 10    • BUN 05/02/2023 17    • Creatinine 05/02/2023 0 94    • Glucose 05/02/2023 91    • Calcium 05/02/2023 9 7    • AST 05/02/2023 28    • ALT 05/02/2023 46    • Alkaline Phosphatase 05/02/2023 84    • Total Protein 05/02/2023 7 2    • Albumin 05/02/2023 4 4    • Total Bilirubin 05/02/2023 0 48    • eGFR 05/02/2023 114    • Protime 05/02/2023 13 0    • INR 05/02/2023 0 97    • PTT 05/02/2023 29    • Lipase 05/02/2023 25      I have personally reviewed all pertinent laboratory/tests results  Suicide/Homicide Risk Assessment:    Risk of Harm to Self:  Protective Factors: no current suicidal ideation, access to mental health treatment, compliant with medications, compliant with mental health treatment, effective decision-making skills, effective problem solving skills, good self-esteem, having a desire to be alive  Based on today's assessment, Imtiaz Escobedo presents the following risk of harm to self: minimal    Risk of Harm to Others:  Based on today's assessment, Imtiaz Escobedo presents the following risk of harm to others: none    The following interventions are recommended: referral for psychotherapy    Medications Risks/Benefits:      Risks, Benefits And Possible Side Effects Of Medications:    Discussed risks and benefits of treatment with patient including risk of suicidality, serotonin syndrome, increased QTc interval and SIADH related to treatment with antidepressants; Risk of induction of manic symptoms in certain patient populations and risk of parkinsonian symptoms, metabolic syndrome, tardive dyskinesia and neuroleptic malignant syndrome related to treatment with antipsychotic medications     Controlled Medication Discussion:     Not applicable    Treatment Plan:    Due for update/Updated:   no  Last treatment plan done 4/5/23   Treatment Plan due on 10/5/23      HOLGER Lowry 05/23/23    Visit Time    Visit Start Time: 2:05  Visit Stop Time: 073  Total Visit Duration: 15 minutes

## 2023-05-24 ENCOUNTER — DOCUMENTATION (OUTPATIENT)
Dept: GASTROENTEROLOGY | Facility: CLINIC | Age: 23
End: 2023-05-24

## 2023-05-24 ENCOUNTER — TELEPHONE (OUTPATIENT)
Dept: GASTROENTEROLOGY | Facility: CLINIC | Age: 23
End: 2023-05-24

## 2023-05-31 ENCOUNTER — HOSPITAL ENCOUNTER (OUTPATIENT)
Dept: PERIOP | Facility: HOSPITAL | Age: 23
Setting detail: OUTPATIENT SURGERY
Discharge: HOME | End: 2023-05-31
Admitting: INTERNAL MEDICINE
Payer: COMMERCIAL

## 2023-05-31 ENCOUNTER — ANESTHESIA (OUTPATIENT)
Dept: PERIOP | Facility: HOSPITAL | Age: 23
End: 2023-05-31

## 2023-05-31 ENCOUNTER — ANESTHESIA EVENT (OUTPATIENT)
Dept: PERIOP | Facility: HOSPITAL | Age: 23
End: 2023-05-31

## 2023-05-31 VITALS
RESPIRATION RATE: 20 BRPM | HEIGHT: 71 IN | HEART RATE: 85 BPM | TEMPERATURE: 98 F | BODY MASS INDEX: 34.44 KG/M2 | SYSTOLIC BLOOD PRESSURE: 113 MMHG | DIASTOLIC BLOOD PRESSURE: 66 MMHG | WEIGHT: 246 LBS | OXYGEN SATURATION: 94 %

## 2023-05-31 DIAGNOSIS — K62.5 BRBPR (BRIGHT RED BLOOD PER RECTUM): ICD-10-CM

## 2023-05-31 DIAGNOSIS — K21.9 GASTROESOPHAGEAL REFLUX DISEASE, UNSPECIFIED WHETHER ESOPHAGITIS PRESENT: ICD-10-CM

## 2023-05-31 DIAGNOSIS — K92.0 HEMATEMESIS WITH NAUSEA: ICD-10-CM

## 2023-05-31 PROCEDURE — 88305 TISSUE EXAM BY PATHOLOGIST: CPT | Performed by: PATHOLOGY

## 2023-05-31 RX ORDER — SODIUM CHLORIDE, SODIUM LACTATE, POTASSIUM CHLORIDE, CALCIUM CHLORIDE 600; 310; 30; 20 MG/100ML; MG/100ML; MG/100ML; MG/100ML
INJECTION, SOLUTION INTRAVENOUS CONTINUOUS PRN
Status: DISCONTINUED | OUTPATIENT
Start: 2023-05-31 | End: 2023-05-31

## 2023-05-31 RX ORDER — LIDOCAINE HYDROCHLORIDE 20 MG/ML
INJECTION, SOLUTION EPIDURAL; INFILTRATION; INTRACAUDAL; PERINEURAL AS NEEDED
Status: DISCONTINUED | OUTPATIENT
Start: 2023-05-31 | End: 2023-05-31

## 2023-05-31 RX ORDER — PROPOFOL 10 MG/ML
INJECTION, EMULSION INTRAVENOUS AS NEEDED
Status: DISCONTINUED | OUTPATIENT
Start: 2023-05-31 | End: 2023-05-31

## 2023-05-31 RX ADMIN — LIDOCAINE HYDROCHLORIDE 100 MG: 20 INJECTION, SOLUTION EPIDURAL; INFILTRATION; INTRACAUDAL; PERINEURAL at 11:36

## 2023-05-31 RX ADMIN — PROPOFOL 50 MG: 10 INJECTION, EMULSION INTRAVENOUS at 11:46

## 2023-05-31 RX ADMIN — PROPOFOL 50 MG: 10 INJECTION, EMULSION INTRAVENOUS at 11:39

## 2023-05-31 RX ADMIN — SODIUM CHLORIDE, SODIUM LACTATE, POTASSIUM CHLORIDE, AND CALCIUM CHLORIDE: .6; .31; .03; .02 INJECTION, SOLUTION INTRAVENOUS at 11:31

## 2023-05-31 RX ADMIN — PROPOFOL 50 MG: 10 INJECTION, EMULSION INTRAVENOUS at 11:37

## 2023-05-31 RX ADMIN — PROPOFOL 30 MG: 10 INJECTION, EMULSION INTRAVENOUS at 11:53

## 2023-05-31 RX ADMIN — PROPOFOL 50 MG: 10 INJECTION, EMULSION INTRAVENOUS at 11:49

## 2023-05-31 RX ADMIN — PROPOFOL 200 MG: 10 INJECTION, EMULSION INTRAVENOUS at 11:36

## 2023-05-31 RX ADMIN — PROPOFOL 50 MG: 10 INJECTION, EMULSION INTRAVENOUS at 11:41

## 2023-05-31 RX ADMIN — PROPOFOL 50 MG: 10 INJECTION, EMULSION INTRAVENOUS at 11:38

## 2023-05-31 RX ADMIN — PROPOFOL 50 MG: 10 INJECTION, EMULSION INTRAVENOUS at 11:51

## 2023-05-31 RX ADMIN — PROPOFOL 50 MG: 10 INJECTION, EMULSION INTRAVENOUS at 11:43

## 2023-05-31 NOTE — H&P
"H&P EXAM - Outpatient Endoscopy   Kate Aragon 21 y o  male MRN: [id number]    [address] [facility]   Encounter: [id number]        History and Physical -  [facility]  Kate Aragon 21 y o  male MRN: [id number]                  HPI: Kate Aragon is a 21y o  year old male who presents for gerd, rectal bleeding, hematemesis      REVIEW OF SYSTEMS: Per the HPI, and otherwise unremarkable  Historical Information   History reviewed  No pertinent past medical history  History reviewed  No pertinent surgical history  Social History   Social History     Substance and Sexual Activity   Alcohol Use Not Currently     Social History     Substance and Sexual Activity   Drug Use Not Currently     Social History     Tobacco Use   Smoking Status Some Days   • Years: 3 00   • Types: Cigarettes   Smokeless Tobacco Never   Tobacco Comments    once monthy     Family History   Problem Relation Age of Onset   • No Known Problems Mother    • No Known Problems Father        Meds/Allergies     (Not in a hospital admission)      No Known Allergies    Objective     /68   Pulse 83   Temp (!) 96 8 °F (36 °C) (Tympanic)   Resp 18   Ht 5' 11\" (1 803 m)   Wt 112 kg (246 lb)   SpO2 93%   BMI 34 31 kg/m²       PHYSICAL EXAM    Gen: NAD  CV: RRR  CHEST: Clear  ABD: soft, NT/ND  EXT: no edema      ASSESSMENT/PLAN:  This is a 21y o  year old male here for egd/colonoscopy, and he is stable and optimized for his procedure            "

## 2023-05-31 NOTE — ANESTHESIA POSTPROCEDURE EVALUATION
Post-Op Assessment Note    CV Status:  Stable  Pain Score: 0    Pain management: adequate     Mental Status:  Alert and awake   Hydration Status:  Euvolemic   PONV Controlled:  Controlled   Airway Patency:  Patent      Post Op Vitals Reviewed: Yes      Staff: CRNA         No notable events documented      BP   140/60   Temp  97 8   Pulse  114   Resp   18   SpO2   98

## 2023-05-31 NOTE — ANESTHESIA PREPROCEDURE EVALUATION
Procedure:  COLONOSCOPY  EGD    Relevant Problems   GI/HEPATIC   (+) Gastroesophageal reflux disease   (+) Hematemesis with nausea      NEURO/PSYCH   (+) Generalized anxiety disorder   (+) Panic disorder        Physical Exam    Airway    Mallampati score: II  TM Distance: >3 FB  Neck ROM: full     Dental       Cardiovascular  Cardiovascular exam normal    Pulmonary  Pulmonary exam normal     Other Findings        Anesthesia Plan  ASA Score- 2     Anesthesia Type- IV sedation with anesthesia with ASA Monitors  Additional Monitors:   Airway Plan:           Plan Factors-Exercise tolerance (METS): >4 METS  Chart reviewed  EKG reviewed  Imaging results reviewed  Existing labs reviewed  Patient summary reviewed  Induction- intravenous  Postoperative Plan-     Informed Consent- Anesthetic plan and risks discussed with patient  I personally reviewed this patient with the CRNA  Discussed and agreed on the Anesthesia Plan with the CRNA  Fuad De La Cruz

## 2023-06-01 ENCOUNTER — OFFICE VISIT (OUTPATIENT)
Dept: FAMILY MEDICINE CLINIC | Facility: CLINIC | Age: 23
End: 2023-06-01

## 2023-06-01 VITALS
WEIGHT: 252.2 LBS | SYSTOLIC BLOOD PRESSURE: 130 MMHG | OXYGEN SATURATION: 98 % | HEART RATE: 83 BPM | DIASTOLIC BLOOD PRESSURE: 78 MMHG | BODY MASS INDEX: 35.31 KG/M2 | HEIGHT: 71 IN

## 2023-06-01 DIAGNOSIS — J06.9 UPPER RESPIRATORY TRACT INFECTION, UNSPECIFIED TYPE: Primary | ICD-10-CM

## 2023-06-01 RX ORDER — AMOXICILLIN 500 MG/1
500 CAPSULE ORAL EVERY 8 HOURS SCHEDULED
Qty: 30 CAPSULE | Refills: 0 | Status: ON HOLD | OUTPATIENT
Start: 2023-06-01 | End: 2023-06-11

## 2023-06-01 NOTE — ASSESSMENT & PLAN NOTE
Prescription for amoxicillin  Recommended that he continue symptomatic relief  Will notify us if symptoms do not improve or worsen

## 2023-06-01 NOTE — LETTER
June 1, 2023     Patient: Andriy Wolf  YOB: 2000  Date of Visit: 6/1/2023      To Whom it May Concern:    Andriy Wolf is under my professional care  Anson Marshall was seen in my office on 6/1/2023  Anson Parents may return to work on 6/2/2023   If you have any questions or concerns, please don't hesitate to call           Sincerely,            Jean Marie Delgado PA-C

## 2023-06-01 NOTE — PROGRESS NOTES
Name: Graciela Petty      : 2000      MRN: [id number]  Encounter Provider: Ellie Thomas PA-C  Encounter Date: 2023   Encounter department: [address] [place] [address]     1  Upper respiratory tract infection, unspecified type  Assessment & Plan:  Prescription for amoxicillin  Recommended that he continue symptomatic relief  Will notify us if symptoms do not improve or worsen    Orders:  -     amoxicillin (AMOXIL) 500 mg capsule; Take 1 capsule (500 mg total) by mouth every 8 (eight) hours for 10 days           Subjective      Janina Dalal is a pleasant 72-year-old male who is here today complaining of cold-like symptoms for the past 2 weeks  He is complaining of runny nose, sinus pressure, congestion, slight cough, and sore throat  He denies any fevers, chills, body aches, chest pains, or shortness of breath  He has been taking over-the-counter allergy and cold medication with no relief  Review of Systems   Constitutional: Negative for chills, diaphoresis, fatigue and fever  HENT: Positive for congestion, rhinorrhea, sinus pressure and sore throat  Negative for ear pain, postnasal drip, sneezing and trouble swallowing  Eyes: Negative for pain and visual disturbance  Respiratory: Positive for cough  Negative for apnea, shortness of breath and wheezing  Cardiovascular: Negative for chest pain and palpitations  Gastrointestinal: Negative for abdominal pain, constipation, diarrhea, nausea and vomiting  Genitourinary: Negative for dysuria  Musculoskeletal: Negative for arthralgias, gait problem and myalgias  Neurological: Negative for dizziness, syncope, weakness, light-headedness, numbness and headaches  Psychiatric/Behavioral: Negative for suicidal ideas  The patient is not nervous/anxious          Current Outpatient Medications on File Prior to Visit   Medication Sig   • cariprazine (Vraylar) 1 5 MG capsule Take 1 capsule (1 5 mg total) by mouth daily   • famotidine "(PEPCID) 20 mg tablet Take 2 tablets (40 mg total) by mouth daily at bedtime   • meclizine (ANTIVERT) 25 mg tablet Take 1 tablet (25 mg total) by mouth every 8 (eight) hours as needed for dizziness or nausea   • omeprazole (PriLOSEC) 40 MG capsule Take 1 capsule (40 mg total) by mouth daily   • ondansetron (ZOFRAN) 4 mg tablet Take 1 tablet (4 mg total) by mouth every 8 (eight) hours as needed for nausea or vomiting   • sertraline (ZOLOFT) 100 mg tablet Take 2 tablets (200 mg total) by mouth daily   • traZODone (DESYREL) 50 mg tablet Take 1 tablet (50 mg total) by mouth daily at bedtime   • hydrOXYzine pamoate (VISTARIL) 50 mg capsule Take 1 capsule (50 mg total) by mouth 3 (three) times a day as needed for anxiety   • sucralfate (CARAFATE) 1 g/10 mL suspension Take 10 mL (1 g total) by mouth 4 (four) times a day (with meals and at bedtime) (Patient not taking: Reported on 6/1/2023)       Objective     /78   Pulse 83   Ht 5' 11\" (1 803 m)   Wt 114 kg (252 lb 3 2 oz)   SpO2 98%   BMI 35 17 kg/m²     Physical Exam  Vitals and nursing note reviewed  Constitutional:       Appearance: He is well-developed  HENT:      Head: Normocephalic and atraumatic  Right Ear: Tympanic membrane, ear canal and external ear normal       Left Ear: Tympanic membrane, ear canal and external ear normal       Nose: Mucosal edema, congestion and rhinorrhea present  Right Turbinates: Swollen  Left Turbinates: Swollen  Mouth/Throat:      Pharynx: No oropharyngeal exudate or posterior oropharyngeal erythema  Eyes:      Extraocular Movements: Extraocular movements intact  Cardiovascular:      Rate and Rhythm: Normal rate and regular rhythm  Heart sounds: Normal heart sounds  No murmur heard  No friction rub  No gallop  Pulmonary:      Effort: Pulmonary effort is normal  No respiratory distress  Breath sounds: Normal breath sounds  No wheezing or rales     Musculoskeletal:         General: " Normal range of motion  Cervical back: Normal range of motion and neck supple  Lymphadenopathy:      Cervical: No cervical adenopathy  Skin:     General: Skin is warm and dry  Neurological:      Mental Status: He is alert and oriented to person, place, and time  Psychiatric:         Behavior: Behavior normal          Thought Content:  Thought content normal          Judgment: Judgment normal        Felipe Chang PA-C

## 2023-06-05 PROCEDURE — 88305 TISSUE EXAM BY PATHOLOGIST: CPT | Performed by: PATHOLOGY

## 2023-06-05 NOTE — RESULT ENCOUNTER NOTE
Results relayed via Mychart  Peptic duodenitis but otherwise unremarkable pathology   Office follow up is scheduled in  August

## 2023-06-08 ENCOUNTER — HOSPITAL ENCOUNTER (EMERGENCY)
Facility: HOSPITAL | Age: 23
End: 2023-06-09
Attending: EMERGENCY MEDICINE
Payer: COMMERCIAL

## 2023-06-08 ENCOUNTER — APPOINTMENT (EMERGENCY)
Dept: RADIOLOGY | Facility: HOSPITAL | Age: 23
End: 2023-06-08
Payer: COMMERCIAL

## 2023-06-08 DIAGNOSIS — R45.851 SUICIDAL IDEATIONS: Primary | ICD-10-CM

## 2023-06-08 DIAGNOSIS — Z00.8 MEDICAL CLEARANCE FOR PSYCHIATRIC ADMISSION: ICD-10-CM

## 2023-06-08 LAB
AMPHETAMINES SERPL QL SCN: NEGATIVE
BARBITURATES UR QL: NEGATIVE
BENZODIAZ UR QL: NEGATIVE
COCAINE UR QL: NEGATIVE
ETHANOL EXG-MCNC: 0 MG/DL
METHADONE UR QL: NEGATIVE
OPIATES UR QL SCN: NEGATIVE
OXYCODONE+OXYMORPHONE UR QL SCN: NEGATIVE
PCP UR QL: NEGATIVE
THC UR QL: NEGATIVE

## 2023-06-08 PROCEDURE — 99285 EMERGENCY DEPT VISIT HI MDM: CPT

## 2023-06-08 PROCEDURE — 73130 X-RAY EXAM OF HAND: CPT

## 2023-06-08 PROCEDURE — 80307 DRUG TEST PRSMV CHEM ANLYZR: CPT | Performed by: EMERGENCY MEDICINE

## 2023-06-08 PROCEDURE — 82075 ASSAY OF BREATH ETHANOL: CPT | Performed by: EMERGENCY MEDICINE

## 2023-06-08 RX ORDER — FAMOTIDINE 20 MG/1
40 TABLET, FILM COATED ORAL
Status: DISCONTINUED | OUTPATIENT
Start: 2023-06-08 | End: 2023-06-09 | Stop reason: HOSPADM

## 2023-06-08 RX ORDER — SERTRALINE HYDROCHLORIDE 100 MG/1
200 TABLET, FILM COATED ORAL DAILY
Status: DISCONTINUED | OUTPATIENT
Start: 2023-06-09 | End: 2023-06-09 | Stop reason: HOSPADM

## 2023-06-08 RX ORDER — MECLIZINE HCL 12.5 MG/1
25 TABLET ORAL EVERY 8 HOURS PRN
Status: DISCONTINUED | OUTPATIENT
Start: 2023-06-08 | End: 2023-06-09 | Stop reason: HOSPADM

## 2023-06-08 RX ORDER — TRAZODONE HYDROCHLORIDE 50 MG/1
50 TABLET ORAL
Status: DISCONTINUED | OUTPATIENT
Start: 2023-06-08 | End: 2023-06-09 | Stop reason: HOSPADM

## 2023-06-08 RX ORDER — PANTOPRAZOLE SODIUM 40 MG/1
40 TABLET, DELAYED RELEASE ORAL
Status: DISCONTINUED | OUTPATIENT
Start: 2023-06-09 | End: 2023-06-09 | Stop reason: HOSPADM

## 2023-06-08 RX ORDER — HYDROXYZINE HYDROCHLORIDE 25 MG/1
50 TABLET, FILM COATED ORAL 3 TIMES DAILY PRN
Status: DISCONTINUED | OUTPATIENT
Start: 2023-06-08 | End: 2023-06-09 | Stop reason: HOSPADM

## 2023-06-09 ENCOUNTER — HOSPITAL ENCOUNTER (INPATIENT)
Facility: HOSPITAL | Age: 23
LOS: 5 days | Discharge: HOME | DRG: 885 | End: 2023-06-14
Attending: HOSPITALIST | Admitting: FAMILY MEDICINE
Payer: COMMERCIAL

## 2023-06-09 VITALS
RESPIRATION RATE: 16 BRPM | TEMPERATURE: 98 F | WEIGHT: 249 LBS | HEART RATE: 103 BPM | DIASTOLIC BLOOD PRESSURE: 82 MMHG | HEIGHT: 71 IN | OXYGEN SATURATION: 97 % | BODY MASS INDEX: 34.86 KG/M2 | SYSTOLIC BLOOD PRESSURE: 132 MMHG

## 2023-06-09 DIAGNOSIS — F41.1 GENERALIZED ANXIETY DISORDER: ICD-10-CM

## 2023-06-09 DIAGNOSIS — Z00.8 MEDICAL CLEARANCE FOR PSYCHIATRIC ADMISSION: ICD-10-CM

## 2023-06-09 DIAGNOSIS — R09.81 SINUS CONGESTION: ICD-10-CM

## 2023-06-09 DIAGNOSIS — F33.2 MDD (MAJOR DEPRESSIVE DISORDER), RECURRENT EPISODE, SEVERE (HCC): Primary | ICD-10-CM

## 2023-06-09 DIAGNOSIS — F41.0 PANIC DISORDER: ICD-10-CM

## 2023-06-09 DIAGNOSIS — G47.00 INSOMNIA: ICD-10-CM

## 2023-06-09 DIAGNOSIS — H81.13 BENIGN PAROXYSMAL POSITIONAL VERTIGO DUE TO BILATERAL VESTIBULAR DISORDER: ICD-10-CM

## 2023-06-09 DIAGNOSIS — Z72.0 TOBACCO ABUSE: ICD-10-CM

## 2023-06-09 DIAGNOSIS — E53.8 VITAMIN B12 DEFICIENCY: ICD-10-CM

## 2023-06-09 DIAGNOSIS — E55.9 VITAMIN D DEFICIENCY: ICD-10-CM

## 2023-06-09 DIAGNOSIS — K92.0 HEMATEMESIS WITH NAUSEA: ICD-10-CM

## 2023-06-09 DIAGNOSIS — K21.9 GASTROESOPHAGEAL REFLUX DISEASE, UNSPECIFIED WHETHER ESOPHAGITIS PRESENT: ICD-10-CM

## 2023-06-09 RX ORDER — HYDROXYZINE 50 MG/1
100 TABLET, FILM COATED ORAL
Status: DISCONTINUED | OUTPATIENT
Start: 2023-06-09 | End: 2023-06-14 | Stop reason: HOSPADM

## 2023-06-09 RX ORDER — HALOPERIDOL 5 MG/ML
2.5 INJECTION INTRAMUSCULAR
Status: DISCONTINUED | OUTPATIENT
Start: 2023-06-09 | End: 2023-06-14 | Stop reason: HOSPADM

## 2023-06-09 RX ORDER — AMOXICILLIN 500 MG/1
500 CAPSULE ORAL EVERY 8 HOURS SCHEDULED
Status: DISPENSED | OUTPATIENT
Start: 2023-06-09 | End: 2023-06-11

## 2023-06-09 RX ORDER — HALOPERIDOL 5 MG/ML
5 INJECTION INTRAMUSCULAR
Status: DISCONTINUED | OUTPATIENT
Start: 2023-06-09 | End: 2023-06-14 | Stop reason: HOSPADM

## 2023-06-09 RX ORDER — BENZTROPINE MESYLATE 1 MG/ML
1 INJECTION INTRAMUSCULAR; INTRAVENOUS
Status: CANCELLED | OUTPATIENT
Start: 2023-06-09

## 2023-06-09 RX ORDER — PANTOPRAZOLE SODIUM 40 MG/1
40 TABLET, DELAYED RELEASE ORAL
Status: CANCELLED | OUTPATIENT
Start: 2023-06-10

## 2023-06-09 RX ORDER — AMOXICILLIN 250 MG
1 CAPSULE ORAL DAILY PRN
Status: DISCONTINUED | OUTPATIENT
Start: 2023-06-09 | End: 2023-06-14 | Stop reason: HOSPADM

## 2023-06-09 RX ORDER — BENZTROPINE MESYLATE 1 MG/ML
1 INJECTION INTRAMUSCULAR; INTRAVENOUS
Status: DISCONTINUED | OUTPATIENT
Start: 2023-06-09 | End: 2023-06-14 | Stop reason: HOSPADM

## 2023-06-09 RX ORDER — HALOPERIDOL 5 MG/ML
2.5 INJECTION INTRAMUSCULAR
Status: CANCELLED | OUTPATIENT
Start: 2023-06-09

## 2023-06-09 RX ORDER — ACETAMINOPHEN 325 MG/1
975 TABLET ORAL EVERY 6 HOURS PRN
Status: DISCONTINUED | OUTPATIENT
Start: 2023-06-09 | End: 2023-06-14 | Stop reason: HOSPADM

## 2023-06-09 RX ORDER — DIPHENHYDRAMINE HYDROCHLORIDE 50 MG/ML
50 INJECTION INTRAMUSCULAR; INTRAVENOUS EVERY 6 HOURS PRN
Status: CANCELLED | OUTPATIENT
Start: 2023-06-09

## 2023-06-09 RX ORDER — ACETAMINOPHEN 325 MG/1
650 TABLET ORAL EVERY 6 HOURS PRN
Status: DISCONTINUED | OUTPATIENT
Start: 2023-06-09 | End: 2023-06-14 | Stop reason: HOSPADM

## 2023-06-09 RX ORDER — ACETAMINOPHEN 325 MG/1
650 TABLET ORAL EVERY 4 HOURS PRN
Status: DISCONTINUED | OUTPATIENT
Start: 2023-06-09 | End: 2023-06-14 | Stop reason: HOSPADM

## 2023-06-09 RX ORDER — HALOPERIDOL 5 MG/1
5 TABLET ORAL
Status: CANCELLED | OUTPATIENT
Start: 2023-06-09

## 2023-06-09 RX ORDER — BENZTROPINE MESYLATE 1 MG/ML
0.5 INJECTION INTRAMUSCULAR; INTRAVENOUS
Status: CANCELLED | OUTPATIENT
Start: 2023-06-09

## 2023-06-09 RX ORDER — LORAZEPAM 2 MG/ML
1 INJECTION INTRAMUSCULAR
Status: CANCELLED | OUTPATIENT
Start: 2023-06-09

## 2023-06-09 RX ORDER — HYDROXYZINE HYDROCHLORIDE 25 MG/1
100 TABLET, FILM COATED ORAL
Status: CANCELLED | OUTPATIENT
Start: 2023-06-09

## 2023-06-09 RX ORDER — HYDROXYZINE 50 MG/1
50 TABLET, FILM COATED ORAL
Status: DISCONTINUED | OUTPATIENT
Start: 2023-06-09 | End: 2023-06-14 | Stop reason: HOSPADM

## 2023-06-09 RX ORDER — HALOPERIDOL 1 MG/1
2 TABLET ORAL
Status: CANCELLED | OUTPATIENT
Start: 2023-06-09

## 2023-06-09 RX ORDER — SERTRALINE HYDROCHLORIDE 100 MG/1
200 TABLET, FILM COATED ORAL DAILY
Status: DISCONTINUED | OUTPATIENT
Start: 2023-06-10 | End: 2023-06-14 | Stop reason: HOSPADM

## 2023-06-09 RX ORDER — HALOPERIDOL 5 MG/1
5 TABLET ORAL
Status: DISCONTINUED | OUTPATIENT
Start: 2023-06-09 | End: 2023-06-14 | Stop reason: HOSPADM

## 2023-06-09 RX ORDER — DIPHENHYDRAMINE HYDROCHLORIDE 50 MG/ML
50 INJECTION INTRAMUSCULAR; INTRAVENOUS EVERY 6 HOURS PRN
Status: DISCONTINUED | OUTPATIENT
Start: 2023-06-09 | End: 2023-06-14 | Stop reason: HOSPADM

## 2023-06-09 RX ORDER — SERTRALINE HYDROCHLORIDE 100 MG/1
200 TABLET, FILM COATED ORAL DAILY
Status: CANCELLED | OUTPATIENT
Start: 2023-06-10

## 2023-06-09 RX ORDER — TRAZODONE HYDROCHLORIDE 50 MG/1
50 TABLET ORAL
Status: DISCONTINUED | OUTPATIENT
Start: 2023-06-09 | End: 2023-06-10

## 2023-06-09 RX ORDER — LORAZEPAM 2 MG/ML
2 INJECTION INTRAMUSCULAR
Status: CANCELLED | OUTPATIENT
Start: 2023-06-09

## 2023-06-09 RX ORDER — AMOXICILLIN 250 MG/1
500 CAPSULE ORAL EVERY 8 HOURS SCHEDULED
Status: CANCELLED | OUTPATIENT
Start: 2023-06-09 | End: 2023-06-11

## 2023-06-09 RX ORDER — AMOXICILLIN 250 MG
1 CAPSULE ORAL DAILY PRN
Status: CANCELLED | OUTPATIENT
Start: 2023-06-09

## 2023-06-09 RX ORDER — PANTOPRAZOLE SODIUM 40 MG/1
40 TABLET, DELAYED RELEASE ORAL
Status: DISCONTINUED | OUTPATIENT
Start: 2023-06-10 | End: 2023-06-14 | Stop reason: HOSPADM

## 2023-06-09 RX ORDER — LANOLIN ALCOHOL/MO/W.PET/CERES
3 CREAM (GRAM) TOPICAL
Status: DISCONTINUED | OUTPATIENT
Start: 2023-06-09 | End: 2023-06-13

## 2023-06-09 RX ORDER — LORAZEPAM 2 MG/ML
2 INJECTION INTRAMUSCULAR EVERY 6 HOURS PRN
Status: CANCELLED | OUTPATIENT
Start: 2023-06-09

## 2023-06-09 RX ORDER — ACETAMINOPHEN 325 MG/1
650 TABLET ORAL EVERY 6 HOURS PRN
Status: CANCELLED | OUTPATIENT
Start: 2023-06-09

## 2023-06-09 RX ORDER — HALOPERIDOL 5 MG/ML
5 INJECTION INTRAMUSCULAR
Status: CANCELLED | OUTPATIENT
Start: 2023-06-09

## 2023-06-09 RX ORDER — TRAZODONE HYDROCHLORIDE 50 MG/1
50 TABLET ORAL
Status: CANCELLED | OUTPATIENT
Start: 2023-06-09

## 2023-06-09 RX ORDER — BENZTROPINE MESYLATE 1 MG/1
1 TABLET ORAL
Status: CANCELLED | OUTPATIENT
Start: 2023-06-09

## 2023-06-09 RX ORDER — BISACODYL 10 MG
10 SUPPOSITORY, RECTAL RECTAL DAILY PRN
Status: CANCELLED | OUTPATIENT
Start: 2023-06-09

## 2023-06-09 RX ORDER — BISACODYL 10 MG
10 SUPPOSITORY, RECTAL RECTAL DAILY PRN
Status: DISCONTINUED | OUTPATIENT
Start: 2023-06-09 | End: 2023-06-10

## 2023-06-09 RX ORDER — MAGNESIUM HYDROXIDE/ALUMINUM HYDROXICE/SIMETHICONE 120; 1200; 1200 MG/30ML; MG/30ML; MG/30ML
30 SUSPENSION ORAL EVERY 4 HOURS PRN
Status: DISCONTINUED | OUTPATIENT
Start: 2023-06-09 | End: 2023-06-14 | Stop reason: HOSPADM

## 2023-06-09 RX ORDER — POLYETHYLENE GLYCOL 3350 17 G/17G
17 POWDER, FOR SOLUTION ORAL DAILY PRN
Status: CANCELLED | OUTPATIENT
Start: 2023-06-09

## 2023-06-09 RX ORDER — ACETAMINOPHEN 325 MG/1
975 TABLET ORAL EVERY 6 HOURS PRN
Status: CANCELLED | OUTPATIENT
Start: 2023-06-09

## 2023-06-09 RX ORDER — LORAZEPAM 2 MG/ML
2 INJECTION INTRAMUSCULAR
Status: DISCONTINUED | OUTPATIENT
Start: 2023-06-09 | End: 2023-06-14 | Stop reason: HOSPADM

## 2023-06-09 RX ORDER — HYDROXYZINE HYDROCHLORIDE 25 MG/1
50 TABLET, FILM COATED ORAL
Status: CANCELLED | OUTPATIENT
Start: 2023-06-09

## 2023-06-09 RX ORDER — PROPRANOLOL HYDROCHLORIDE 20 MG/1
10 TABLET ORAL EVERY 8 HOURS PRN
Status: CANCELLED | OUTPATIENT
Start: 2023-06-09

## 2023-06-09 RX ORDER — HYDROXYZINE HYDROCHLORIDE 25 MG/1
25 TABLET, FILM COATED ORAL
Status: DISCONTINUED | OUTPATIENT
Start: 2023-06-09 | End: 2023-06-14 | Stop reason: HOSPADM

## 2023-06-09 RX ORDER — HALOPERIDOL 2 MG/1
2 TABLET ORAL
Status: DISCONTINUED | OUTPATIENT
Start: 2023-06-09 | End: 2023-06-14 | Stop reason: HOSPADM

## 2023-06-09 RX ORDER — BENZTROPINE MESYLATE 1 MG/ML
0.5 INJECTION INTRAMUSCULAR; INTRAVENOUS
Status: DISCONTINUED | OUTPATIENT
Start: 2023-06-09 | End: 2023-06-14 | Stop reason: HOSPADM

## 2023-06-09 RX ORDER — HYDROXYZINE HYDROCHLORIDE 25 MG/1
25 TABLET, FILM COATED ORAL
Status: CANCELLED | OUTPATIENT
Start: 2023-06-09

## 2023-06-09 RX ORDER — POLYETHYLENE GLYCOL 3350 17 G/17G
17 POWDER, FOR SOLUTION ORAL DAILY PRN
Status: DISCONTINUED | OUTPATIENT
Start: 2023-06-09 | End: 2023-06-10

## 2023-06-09 RX ORDER — PROPRANOLOL HYDROCHLORIDE 10 MG/1
10 TABLET ORAL EVERY 8 HOURS PRN
Status: DISCONTINUED | OUTPATIENT
Start: 2023-06-09 | End: 2023-06-14 | Stop reason: HOSPADM

## 2023-06-09 RX ORDER — BENZTROPINE MESYLATE 1 MG/1
1 TABLET ORAL
Status: DISCONTINUED | OUTPATIENT
Start: 2023-06-09 | End: 2023-06-14 | Stop reason: HOSPADM

## 2023-06-09 RX ORDER — FAMOTIDINE 20 MG/1
40 TABLET, FILM COATED ORAL
Status: DISCONTINUED | OUTPATIENT
Start: 2023-06-09 | End: 2023-06-14 | Stop reason: HOSPADM

## 2023-06-09 RX ORDER — ACETAMINOPHEN 325 MG/1
650 TABLET ORAL EVERY 4 HOURS PRN
Status: CANCELLED | OUTPATIENT
Start: 2023-06-09

## 2023-06-09 RX ORDER — LORAZEPAM 2 MG/ML
1 INJECTION INTRAMUSCULAR
Status: DISCONTINUED | OUTPATIENT
Start: 2023-06-09 | End: 2023-06-14 | Stop reason: HOSPADM

## 2023-06-09 RX ORDER — LANOLIN ALCOHOL/MO/W.PET/CERES
3 CREAM (GRAM) TOPICAL
Status: CANCELLED | OUTPATIENT
Start: 2023-06-09

## 2023-06-09 RX ORDER — FAMOTIDINE 20 MG/1
40 TABLET, FILM COATED ORAL
Status: CANCELLED | OUTPATIENT
Start: 2023-06-09

## 2023-06-09 RX ORDER — MAGNESIUM HYDROXIDE/ALUMINUM HYDROXICE/SIMETHICONE 120; 1200; 1200 MG/30ML; MG/30ML; MG/30ML
30 SUSPENSION ORAL EVERY 4 HOURS PRN
Status: CANCELLED | OUTPATIENT
Start: 2023-06-09

## 2023-06-09 RX ORDER — LORAZEPAM 2 MG/ML
2 INJECTION INTRAMUSCULAR EVERY 6 HOURS PRN
Status: DISCONTINUED | OUTPATIENT
Start: 2023-06-09 | End: 2023-06-14 | Stop reason: HOSPADM

## 2023-06-09 RX ADMIN — FAMOTIDINE 40 MG: 20 TABLET ORAL at 00:30

## 2023-06-09 RX ADMIN — SERTRALINE 200 MG: 100 TABLET, FILM COATED ORAL at 10:23

## 2023-06-09 RX ADMIN — TRAZODONE HYDROCHLORIDE 50 MG: 50 TABLET ORAL at 00:30

## 2023-06-09 RX ADMIN — TRAZODONE HYDROCHLORIDE 50 MG: 50 TABLET ORAL at 22:20
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
## 2023-06-09 NOTE — LETTER
[facility]  [address] [facility] [id number] [phone]  Dept: [phone]    June 14, 2023     Patient: Wallace Jeffers   YOB: 2000   Date of Visit: 6/9/2023       To Whom it May Concern:    Wallace Jeffers is under my professional care  He was seen in the hospital from 6/9/2023 to 06/14/23  He may return to work on 6/19/23 without limitations  If you have any questions or concerns, please don't hesitate to call           Sincerely,          Shannon Osborne
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
## 2023-06-09 NOTE — ED PROVIDER NOTES
Patient has been resting comfortably  Patient is medically clear for psychiatric transfer, treatment       Torsten Leonard MD  06/09/23 9790

## 2023-06-09 NOTE — ED NOTES
Pt presented to the ED with SI with a plan to shoot himself  Pt stated that he has been struggling with his anxiety and depression lately  Pt stated that the symptoms have been worse over the last few days  Pt stated that he came to the ED because he knew he needed help  Pt stated that he owns firearms but they are locked away in a safe at his parents home  Pt stated that what triggered his anxiety and depression today was when he was no able to find his medications  The Pt stated that he was looking for them and when he could not find him it sent him into a anxiety attack which led to his SI  Pt did state that he is currently seeing Dr Cornelius De Santiago for Psychiatry  Pt stated that he is not participating in any individual therapy because he was only offered virtual  Pt stated that he would like to have therapy as long as it is in person  Pt and [facility] talked about inpatient services  Pt is also having both Auditory and Visual Hallucinations  Pt stated that hears people telling him to harm himself  Pt did not go into any further detail  Pt would like to go inpatient and is willing to sign a 201  [facility] explained the 201 process and the rights  Pt understood the process and his rights  201 will be completed and faxed to charge nurse for signatures by the Pt and the Doctor

## 2023-06-09 NOTE — ED NOTES
Crisis reached out to assess the patient  Per nurse Misti Branch from [facility] in [place] was notified and he will be taking over

## 2023-06-09 NOTE — ED NOTES
Patient is accepted at [facility]  Patient is accepted by Dr Marck Chaparro per [facility] at Intake  Transportation is arranged with [employer]  Transportation is scheduled for after 2130  Patient may go to the floor at **  Nurse report is to be called to [phone] prior to patient transfer

## 2023-06-10 PROBLEM — G47.00 INSOMNIA: Status: ACTIVE | Noted: 2023-06-10

## 2023-06-10 PROBLEM — F33.2 MDD (MAJOR DEPRESSIVE DISORDER), RECURRENT EPISODE, SEVERE (HCC): Status: ACTIVE | Noted: 2023-06-10

## 2023-06-10 PROBLEM — F43.10 PTSD (POST-TRAUMATIC STRESS DISORDER): Status: ACTIVE | Noted: 2023-06-10

## 2023-06-10 LAB
ALBUMIN SERPL BCP-MCNC: 4.5 G/DL (ref 3.5–5)
ALP SERPL-CCNC: 88 U/L (ref 34–104)
ALT SERPL W P-5'-P-CCNC: 51 U/L (ref 7–52)
ANION GAP SERPL CALCULATED.3IONS-SCNC: 10 MMOL/L (ref 4–13)
AST SERPL W P-5'-P-CCNC: 33 U/L (ref 13–39)
ATRIAL RATE: 95 BPM
BASOPHILS # BLD AUTO: 0.04 THOUSANDS/ÂΜL (ref 0–0.1)
BASOPHILS NFR BLD AUTO: 1 % (ref 0–1)
BILIRUB SERPL-MCNC: 0.73 MG/DL (ref 0.2–1)
BUN SERPL-MCNC: 17 MG/DL (ref 5–25)
CALCIUM SERPL-MCNC: 10.2 MG/DL (ref 8.4–10.2)
CHLORIDE SERPL-SCNC: 100 MMOL/L (ref 96–108)
CHOLEST SERPL-MCNC: 218 MG/DL
CO2 SERPL-SCNC: 25 MMOL/L (ref 21–32)
CREAT SERPL-MCNC: 0.99 MG/DL (ref 0.6–1.3)
EOSINOPHIL # BLD AUTO: 0.33 THOUSAND/ÂΜL (ref 0–0.61)
EOSINOPHIL NFR BLD AUTO: 5 % (ref 0–6)
ERYTHROCYTE [DISTWIDTH] IN BLOOD BY AUTOMATED COUNT: 12.1 % (ref 11.6–15.1)
GFR SERPL CREATININE-BSD FRML MDRD: 106 ML/MIN/1.73SQ M
GLUCOSE SERPL-MCNC: 126 MG/DL (ref 65–140)
HCT VFR BLD AUTO: 47.3 % (ref 36.5–49.3)
HDLC SERPL-MCNC: 44 MG/DL
HGB BLD-MCNC: 16.4 G/DL (ref 12–17)
IMM GRANULOCYTES # BLD AUTO: 0.02 THOUSAND/UL (ref 0–0.2)
IMM GRANULOCYTES NFR BLD AUTO: 0 % (ref 0–2)
LDLC SERPL CALC-MCNC: 142 MG/DL (ref 0–100)
LYMPHOCYTES # BLD AUTO: 1.91 THOUSANDS/ÂΜL (ref 0.6–4.47)
LYMPHOCYTES NFR BLD AUTO: 27 % (ref 14–44)
MCH RBC QN AUTO: 31.1 PG (ref 26.8–34.3)
MCHC RBC AUTO-ENTMCNC: 34.7 G/DL (ref 31.4–37.4)
MCV RBC AUTO: 90 FL (ref 82–98)
MONOCYTES # BLD AUTO: 0.5 THOUSAND/ÂΜL (ref 0.17–1.22)
MONOCYTES NFR BLD AUTO: 7 % (ref 4–12)
NEUTROPHILS # BLD AUTO: 4.17 THOUSANDS/ÂΜL (ref 1.85–7.62)
NEUTS SEG NFR BLD AUTO: 60 % (ref 43–75)
NONHDLC SERPL-MCNC: 174 MG/DL
NRBC BLD AUTO-RTO: 0 /100 WBCS
P AXIS: 68 DEGREES
PLATELET # BLD AUTO: 300 THOUSANDS/UL (ref 149–390)
PMV BLD AUTO: 10.6 FL (ref 8.9–12.7)
POTASSIUM SERPL-SCNC: 3.7 MMOL/L (ref 3.5–5.3)
PR INTERVAL: 172 MS
PROT SERPL-MCNC: 8.1 G/DL (ref 6.4–8.4)
QRS AXIS: 93 DEGREES
QRSD INTERVAL: 100 MS
QT INTERVAL: 350 MS
QTC INTERVAL: 439 MS
RBC # BLD AUTO: 5.27 MILLION/UL (ref 3.88–5.62)
SODIUM SERPL-SCNC: 135 MMOL/L (ref 135–147)
T WAVE AXIS: 32 DEGREES
TRIGL SERPL-MCNC: 161 MG/DL
TSH SERPL DL<=0.05 MIU/L-ACNC: 1.28 UIU/ML (ref 0.45–4.5)
VENTRICULAR RATE: 95 BPM
WBC # BLD AUTO: 6.97 THOUSAND/UL (ref 4.31–10.16)

## 2023-06-10 PROCEDURE — 93005 ELECTROCARDIOGRAM TRACING: CPT

## 2023-06-10 PROCEDURE — 84443 ASSAY THYROID STIM HORMONE: CPT | Performed by: PSYCHIATRY & NEUROLOGY

## 2023-06-10 PROCEDURE — 99223 1ST HOSP IP/OBS HIGH 75: CPT | Performed by: PSYCHIATRY & NEUROLOGY

## 2023-06-10 PROCEDURE — 82306 VITAMIN D 25 HYDROXY: CPT

## 2023-06-10 PROCEDURE — 85025 COMPLETE CBC W/AUTO DIFF WBC: CPT | Performed by: PSYCHIATRY & NEUROLOGY

## 2023-06-10 PROCEDURE — 82607 VITAMIN B-12: CPT

## 2023-06-10 PROCEDURE — 93010 ELECTROCARDIOGRAM REPORT: CPT | Performed by: INTERNAL MEDICINE

## 2023-06-10 PROCEDURE — 80061 LIPID PANEL: CPT | Performed by: PSYCHIATRY & NEUROLOGY

## 2023-06-10 PROCEDURE — 80053 COMPREHEN METABOLIC PANEL: CPT | Performed by: PSYCHIATRY & NEUROLOGY

## 2023-06-10 PROCEDURE — 82746 ASSAY OF FOLIC ACID SERUM: CPT

## 2023-06-10 RX ORDER — MECLIZINE HCL 12.5 MG/1
25 TABLET ORAL EVERY 8 HOURS PRN
Status: DISCONTINUED | OUTPATIENT
Start: 2023-06-10 | End: 2023-06-14 | Stop reason: HOSPADM

## 2023-06-10 RX ORDER — POLYETHYLENE GLYCOL 3350 17 G/17G
17 POWDER, FOR SOLUTION ORAL DAILY PRN
Status: DISCONTINUED | OUTPATIENT
Start: 2023-06-10 | End: 2023-06-14 | Stop reason: HOSPADM

## 2023-06-10 RX ORDER — ECHINACEA PURPUREA EXTRACT 125 MG
1 TABLET ORAL
Status: DISCONTINUED | OUTPATIENT
Start: 2023-06-10 | End: 2023-06-14 | Stop reason: HOSPADM

## 2023-06-10 RX ORDER — BISACODYL 10 MG
10 SUPPOSITORY, RECTAL RECTAL DAILY PRN
Status: DISCONTINUED | OUTPATIENT
Start: 2023-06-10 | End: 2023-06-14 | Stop reason: HOSPADM

## 2023-06-10 RX ORDER — ONDANSETRON 4 MG/1
4 TABLET, ORALLY DISINTEGRATING ORAL EVERY 6 HOURS PRN
Status: DISCONTINUED | OUTPATIENT
Start: 2023-06-10 | End: 2023-06-14 | Stop reason: HOSPADM

## 2023-06-10 RX ORDER — TRAZODONE HYDROCHLORIDE 100 MG/1
100 TABLET ORAL
Status: DISCONTINUED | OUTPATIENT
Start: 2023-06-10 | End: 2023-06-14 | Stop reason: HOSPADM

## 2023-06-10 RX ADMIN — AMOXICILLIN 500 MG: 500 CAPSULE ORAL at 14:34

## 2023-06-10 RX ADMIN — TRAZODONE HYDROCHLORIDE 100 MG: 100 TABLET ORAL at 21:17

## 2023-06-10 RX ADMIN — CARIPRAZINE 1.5 MG: 1.5 CAPSULE, GELATIN COATED ORAL at 13:00

## 2023-06-10 RX ADMIN — SERTRALINE 200 MG: 100 TABLET, FILM COATED ORAL at 08:43

## 2023-06-10 RX ADMIN — FAMOTIDINE 40 MG: 20 TABLET, FILM COATED ORAL at 21:17

## 2023-06-10 RX ADMIN — ACETAMINOPHEN 975 MG: 325 TABLET ORAL at 17:45

## 2023-06-10 RX ADMIN — PANTOPRAZOLE SODIUM 40 MG: 40 TABLET, DELAYED RELEASE ORAL at 06:09

## 2023-06-10 RX ADMIN — AMOXICILLIN 500 MG: 500 CAPSULE ORAL at 21:20
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
## 2023-06-10 NOTE — PLAN OF CARE
Problem: Alteration in Thoughts and Perception  Goal: Treatment Goal: Gain control of psychotic behaviors/thinking, reduce/eliminate presenting symptoms and demonstrate improved reality functioning upon discharge  Outcome: Not Progressing  Goal: Verbalize thoughts and feelings  Description: Interventions:  - Promote a nonjudgmental and trusting relationship with the patient through active listening and therapeutic communication  - Assess patient's level of functioning, behavior and potential for risk  - Engage patient in 1 on 1 interactions  - Encourage patient to express fears, feelings, frustrations, and discuss symptoms    - [place] patient to reality, help patient recognize reality-based thinking   - Administer medications as ordered and assess for potential side effects  - Provide the patient education related to the signs and symptoms of the illness and desired effects of prescribed medications  Outcome: Not Progressing  Goal: Refrain from acting on delusional thinking/internal stimuli  Description: Interventions:  - Monitor patient closely, per order   - Utilize least restrictive measures   - Set reasonable limits, give positive feedback for acceptable   - Administer medications as ordered and monitor of potential side effects  Outcome: Not Progressing  Goal: Agree to be compliant with medication regime, as prescribed and report medication side effects  Description: Interventions:  - Offer appropriate PRN medication and supervise ingestion; conduct AIMS, as needed   Outcome: Not Progressing  Goal: Attend and participate in unit activities, including therapeutic, recreational, and educational groups  Description: Interventions:  -Encourage Visitation and family involvement in care  Outcome: Not Progressing  Goal: Recognize dysfunctional thoughts, communicate reality-based thoughts at the time of discharge  Description: Interventions:  - Provide medication and psycho-education to assist patient in compliance and developing insight into his/her illness   Outcome: Not Progressing  Goal: Complete daily ADLs, including personal hygiene independently, as able  Description: Interventions:  - Observe, teach, and assist patient with ADLS  - Monitor and promote a balance of rest/activity, with adequate nutrition and elimination   Outcome: Not Progressing     Problem: Ineffective Coping  Goal: Cooperates with admission process  Description: Interventions:   - Complete admission process  Outcome: Not Progressing  Goal: Identifies ineffective coping skills  Outcome: Not Progressing  Goal: Identifies healthy coping skills  Outcome: Not Progressing  Goal: Demonstrates healthy coping skills  Outcome: Not Progressing  Goal: Participates in unit activities  Description: Interventions:  - Provide therapeutic environment   - Provide required programming   - Redirect inappropriate behaviors   Outcome: Not Progressing  Goal: Patient/Family participate in treatment and DC plans  Description: Interventions:  - Provide therapeutic environment  Outcome: Not Progressing  Goal: Patient/Family verbalizes awareness of resources  Outcome: Not Progressing  Goal: Understands least restrictive measures  Description: Interventions:  - Utilize least restrictive behavior  Outcome: Not Progressing  Goal: Free from restraint events  Description: - Utilize least restrictive measures   - Provide behavioral interventions   - Redirect inappropriate behaviors   Outcome: Not Progressing     Problem: Risk for Self Injury/Neglect  Goal: Treatment Goal: Remain safe during length of stay, learn and adopt new coping skills, and be free of self-injurious ideation, impulses and acts at the time of discharge  Outcome: Not Progressing  Goal: Verbalize thoughts and feelings  Description: Interventions:  - Assess and re-assess patient's lethality and potential for self-injury  - Engage patient in 1:1 interactions, daily, for a minimum of 15 minutes  - Encourage patient to express feelings, fears, frustrations, hopes  - Establish rapport/trust with patient   Outcome: Not Progressing  Goal: Refrain from harming self  Description: Interventions:  - Monitor patient closely, per order  - Develop a trusting relationship  - Supervise medication ingestion, monitor effects and side effects   Outcome: Not Progressing  Goal: Attend and participate in unit activities, including therapeutic, recreational, and educational groups  Description: Interventions:  - Provide therapeutic and educational activities daily, encourage attendance and participation, and document same in the medical record  - Obtain collateral information, encourage visitation and family involvement in care   Outcome: Not Progressing  Goal: Recognize maladaptive responses and adopt new coping mechanisms  Outcome: Not Progressing  Goal: Complete daily ADLs, including personal hygiene independently, as able  Description: Interventions:  - Observe, teach, and assist patient with ADLS  - Monitor and promote a balance of rest/activity, with adequate nutrition and elimination  Outcome: Not Progressing

## 2023-06-10 NOTE — PROGRESS NOTES
Pt arrived on the unit with the following belongings:    Remains with Patient:  T-Shirt x1  Shorts x1  Underwear x1  Socks x1 pair    Patient Storage:  Shoes x1 pair  Rag x1    Contraband:  Cell Phone x1  Wallet x1  [place] '[place] x1  Debit Card x3  [employer] Card x2

## 2023-06-10 NOTE — NURSING NOTE
"Patient is pleasant and polite  He is appropriate in conversation and socializes well with peers  States his mood is fair today and that if he is feeling anxious or depressed, he \"can usually sleep it off  \" Denies suicidal thoughts  No complaints of pain  Compliant with medications    "

## 2023-06-10 NOTE — TREATMENT PLAN
TREATMENT PLAN REVIEW - 71 [address]ard 21 y o  2000 male MRN: [id number]    [address] Room / Bed: [place] [address]/[facility] [id number] Encounter: [id number]          Admit Date/Time:  6/9/2023  9:40 PM    Treatment Team: Attending Provider: Oneida Moulton MD; Patient Care Technician: Pedro Foster; Patient Care Assistant: Luana Carr;  Patient Care Assistant: Cecily Abebe; Patient Care Assistant: Prema Victoria; Registered Nurse: Seven Tejada RN    Diagnosis: Principal Problem:    MDD (major depressive disorder), recurrent episode, severe ([facility] [address] )  Active Problems:    Generalized anxiety disorder    Panic disorder    Insomnia    PTSD (post-traumatic stress disorder)      Patient Strengths/Assets: ability for insight, adapts well, average or above intelligence, capable of independent living, cooperative, communication skills, compliant with medication, family ties    Patient Barriers/Limitations: lack of financial means, low self esteem    Short Term Goals: decrease in depressive symptoms, decrease in anxiety symptoms, decrease in suicidal thoughts, ability to stay safe on the unit, ability to stay free of restraints, improvement in ability to express basic needs    Long Term Goals: improvement in depression, improvement in anxiety, free of suicidal thoughts, acceptance of need for psychiatric medications, acceptance of need for psychiatric treatment    Progress Towards Goals: continue psychiatric medications as prescribed    Recommended Treatment: medication management, patient medication education, group therapy, milieu therapy, continued Behavioral Health psychiatric evaluation/assessment process    Treatment Frequency: daily medication monitoring, group and milieu therapy daily, monitoring through interdisciplinary rounds, monitoring through weekly patient care conferences    Expected Discharge Date:  Up to 30 midnights    Discharge Plan: discharge to home, referral for outpatient psychotherapy, return to previous psychiatrist, return to previous living arrangement    Treatment Plan Created/Updated By: Alberto Porras DO
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
## 2023-06-10 NOTE — H&P
Luna Parker#  :[address]dalena Johnson  LWS:[id number]    PGU:[id number]  Adm Date: 2023  9:40 PM   ATT PHY: Ra Correia, [address]         Chief Complaint: suicidal ideations      History of Presenting Illness: René Lei is a(n) 21y o  year old male who is admitted to [address] [place] [address] on voluntary 201 committment basis  Patient originally presented to [address] [facility] ED on 2023 for suicidal ideations  Patient examined at bedside  Patient currently has no acute complaints       No Known Allergies    Current Facility-Administered Medications on File Prior to Encounter   Medication Dose Route Frequency Provider Last Rate Last Admin   • [DISCONTINUED] famotidine (PEPCID) tablet 40 mg  40 mg Oral HS Pat Aguilar MD   40 mg at 23 0030   • [DISCONTINUED] hydrOXYzine HCL (ATARAX) tablet 50 mg  50 mg Oral TID PRN Pat Aguilar MD       • [DISCONTINUED] meclizine (ANTIVERT) tablet 25 mg  25 mg Oral Q8H PRN Pat Aguilar MD       • [DISCONTINUED] pantoprazole (PROTONIX) EC tablet 40 mg  40 mg Oral Early Morning Pat Aguilar MD       • [DISCONTINUED] sertraline (ZOLOFT) tablet 200 mg  200 mg Oral Daily Pat Aguilar MD   200 mg at 23 1023   • [DISCONTINUED] traZODone (DESYREL) tablet 50 mg  50 mg Oral HS Pat Aguilar MD   50 mg at 23 0030     Current Outpatient Medications on File Prior to Encounter   Medication Sig Dispense Refill   • cariprazine (Vraylar) 1 5 MG capsule Take 1 capsule (1 5 mg total) by mouth daily 30 capsule 2   • famotidine (PEPCID) 20 mg tablet Take 2 tablets (40 mg total) by mouth daily at bedtime 180 tablet 0   • hydrOXYzine pamoate (VISTARIL) 50 mg capsule Take 1 capsule (50 mg total) by mouth 3 (three) times a day as needed for anxiety 180 capsule 0   • meclizine (ANTIVERT) 25 mg tablet Take 1 tablet (25 mg total) by mouth every 8 (eight) hours as needed for dizziness or nausea 30 tablet 0   • omeprazole (PriLOSEC) 40 MG capsule Take 1 capsule (40 mg total) by mouth daily 90 capsule 0   • ondansetron (ZOFRAN) 4 mg tablet Take 1 tablet (4 mg total) by mouth every 8 (eight) hours as needed for nausea or vomiting 20 tablet 0   • sertraline (ZOLOFT) 100 mg tablet Take 2 tablets (200 mg total) by mouth daily 60 tablet 2   • traZODone (DESYREL) 50 mg tablet Take 1 tablet (50 mg total) by mouth daily at bedtime 90 tablet 0   • amoxicillin (AMOXIL) 500 mg capsule Take 1 capsule (500 mg total) by mouth every 8 (eight) hours for 10 days 30 capsule 0   • sucralfate (CARAFATE) 1 g/10 mL suspension Take 10 mL (1 g total) by mouth 4 (four) times a day (with meals and at bedtime) (Patient not taking: Reported on 6/1/2023) 414 mL 2     Active Ambulatory Problems     Diagnosis Date Noted   • Generalized anxiety disorder 09/20/2021   • Panic disorder 09/20/2021   • No-show for appointment 10/10/2022   • Gastroesophageal reflux disease 05/01/2023   • History of stomach ulcers 05/01/2023   • Hematemesis with nausea 05/01/2023   • Benign paroxysmal positional vertigo due to bilateral vestibular disorder 05/01/2023   • Upper respiratory tract infection 06/01/2023     Resolved Ambulatory Problems     Diagnosis Date Noted   • No Resolved Ambulatory Problems     Past Medical History:   Diagnosis Date   • Anxiety    • Depression    • Schizoaffective disorder ([facility] )      Past Surgical History:   Procedure Laterality Date   • COLONOSCOPY       Social History:   Social History     Socioeconomic History   • Marital status: Single     Spouse name: None   • Number of children: None   • Years of education: None   • Highest education level: None   Occupational History   • None   Tobacco Use   • Smoking status: Former     Years: 3 00     Types: Cigarettes   • Smokeless tobacco: Never   • Tobacco comments:     once monthy   Vaping Use   • "Vaping Use: Former   • Substances: CBD   Substance and Sexual Activity   • Alcohol use: Not Currently     Alcohol/week: 2 0 standard drinks of alcohol     Types: 2 Shots of liquor per week   • Drug use: Not Currently   • Sexual activity: Yes     Partners: Female   Other Topics Concern   • None   Social History Narrative   • None     Social Determinants of Health     Financial Resource Strain: Not on file   Food Insecurity: Not on file   Transportation Needs: Not on file   Physical Activity: Not on file   Stress: Not on file   Social Connections: Not on file   Intimate Partner Violence: Not on file   Housing Stability: Not on file     Family History:   Family History   Problem Relation Age of Onset   • Depression Mother    • No Known Problems Father      Review of Systems   Constitutional: Negative  Negative for chills and fever  HENT: Negative  Negative for ear pain and sore throat  Eyes: Negative  Negative for pain and visual disturbance  Respiratory: Negative  Negative for cough and shortness of breath  Cardiovascular: Negative  Negative for chest pain and palpitations  Gastrointestinal: Negative  Negative for abdominal pain, constipation, diarrhea, nausea and vomiting  Genitourinary: Negative  Negative for dysuria, frequency, hematuria and urgency  Musculoskeletal: Negative  Negative for arthralgias and back pain  Skin: Negative  Negative for color change and rash  Neurological: Negative  Negative for dizziness and headaches  Psychiatric/Behavioral: Positive for dysphoric mood and sleep disturbance  The patient is nervous/anxious  All other systems reviewed and are negative  Physical Exam   Vitals: Blood pressure 131/82, pulse 103, temperature 98 9 °F (37 2 °C), temperature source Temporal, resp  rate 16, height 5' 11\" (1 803 m), weight 110 kg (243 lb), SpO2 95 %  ,Body mass index is 33 89 kg/m²  Constitutional: Awake, alert, in no acute distress    Head: Normocephalic and " atraumatic  Mouth/Throat: Oropharynx is clear and moist     Eyes: Conjunctivae and EOM are normal    Neck: Neck supple  No thyromegaly present  Cardiovascular: Normal rate, regular rhythm and normal heart sounds  Pulmonary/Chest: Effort normal and breath sounds normal    Abdominal: Soft  Bowel sounds are normal  There is no tenderness  There is no rebound and no guarding  Neurological: No focal deficits  Musculoskeletal:  Nontender spine  Skin: Skin is warm and dry  No edema  Assessment     Marcela Johnson is a(n) 21 y o  male with MDD  1  GERD  Continue Protonix 40 mg daily, Pepcid 40 mg daily at bedtime  2  Vertigo  Stable  Meclizine as needed  3  Insomnia  Patient is on trazodone at bedtime  4  URI  Patient is finishing up amoxicillin 500 mg q8h x 10 days which was prescribed outpatient on 6/1/2023  Saline nasal spray as needed  5  Psych with MDD  This is being managed by the psych team     Prognosis: Fair  Discharge Plan: In progress  Advanced Directives: I have discussed in detail with the patient the advanced directives  The patient not have an appointed POA and does not have a living will  Patient's emergency contact is his mother, Liliana Okeefe, whose number is [phone]  When discussing cardiac and pulmonary resuscitation efforts with the patient, the patient wishes to be full code  I have spent more than 50 minutes gathering data, doing physical examination, and discussing the advanced directives, which was witnessed by caring staff  The patient was discussed with Dr Kiersten Plasencia and he is in agreement with the above note

## 2023-06-11 LAB
25(OH)D3 SERPL-MCNC: 22.2 NG/ML (ref 30–100)
FOLATE SERPL-MCNC: 10.7 NG/ML
VIT B12 SERPL-MCNC: 228 PG/ML (ref 180–914)

## 2023-06-11 PROCEDURE — 99232 SBSQ HOSP IP/OBS MODERATE 35: CPT | Performed by: PSYCHIATRY & NEUROLOGY

## 2023-06-11 RX ORDER — MELATONIN
1000 DAILY
Status: DISCONTINUED | OUTPATIENT
Start: 2023-06-12 | End: 2023-06-14 | Stop reason: HOSPADM

## 2023-06-11 RX ADMIN — PANTOPRAZOLE SODIUM 40 MG: 40 TABLET, DELAYED RELEASE ORAL at 06:04

## 2023-06-11 RX ADMIN — AMOXICILLIN 500 MG: 500 CAPSULE ORAL at 13:25

## 2023-06-11 RX ADMIN — SERTRALINE 200 MG: 100 TABLET, FILM COATED ORAL at 08:33

## 2023-06-11 RX ADMIN — CARIPRAZINE 1.5 MG: 1.5 CAPSULE, GELATIN COATED ORAL at 08:34

## 2023-06-11 RX ADMIN — FAMOTIDINE 40 MG: 20 TABLET, FILM COATED ORAL at 21:11

## 2023-06-11 RX ADMIN — AMOXICILLIN 500 MG: 500 CAPSULE ORAL at 06:05

## 2023-06-11 RX ADMIN — TRAZODONE HYDROCHLORIDE 100 MG: 100 TABLET ORAL at 21:11

## 2023-06-11 NOTE — PROGRESS NOTES
"Progress Note - René Lei 21 y o  male MRN: [id number]    Unit/Bed#: [facility] 245-01 Encounter: [id number]        Subjective:   Patient seen and examined at bedside after reviewing the chart and discussing the case with the caring staff  Patient has no acute symptoms  Physical Exam   Vitals: Blood pressure 119/83, pulse 70, temperature 98 1 °F (36 7 °C), temperature source Temporal, resp  rate 18, height 5' 11\" (1 803 m), weight 110 kg (243 lb), SpO2 95 %  ,Body mass index is 33 89 kg/m²  Constitutional: Patient in no acute distress  HEENT: PERR, EOMI, MMM  Cardiovascular: Normal rate and regular rhythm  Pulmonary/Chest: Effort normal and breath sounds normal    Abdomen: Non distended  Assessment/Plan:  René Lei is a(n) 21 y o  male with MDD      1  GERD  Continue Protonix 40 mg daily, Pepcid 40 mg daily at bedtime  2  Vertigo  Stable  Meclizine as needed  3  Insomnia  Patient is on trazodone at bedtime  4  Vitamin D deficiency  Patient started on vitamin D 1000 units daily  5  Vitamin B12 deficiency  Patient started on vitamin B12 supplement  6  URI  Patient is finishing up amoxicillin 500 mg q8h x 10 days which was prescribed outpatient on 6/1/2023  Saline nasal spray as needed  The patient was discussed with Dr Daryl Jackson and he is in agreement with the above note    "

## 2023-06-11 NOTE — PLAN OF CARE
Problem: Alteration in Thoughts and Perception  Goal: Verbalize thoughts and feelings  Description: Interventions:  - Promote a nonjudgmental and trusting relationship with the patient through active listening and therapeutic communication  - Assess patient's level of functioning, behavior and potential for risk  - Engage patient in 1 on 1 interactions  - Encourage patient to express fears, feelings, frustrations, and discuss symptoms    - Wy[place]o patient to reality, help patient recognize reality-based thinking   - Administer medications as ordered and assess for potential side effects  - Provide the patient education related to the signs and symptoms of the illness and desired effects of prescribed medications  Outcome: Progressing  Goal: Refrain from acting on delusional thinking/internal stimuli  Description: Interventions:  - Monitor patient closely, per order   - Utilize least restrictive measures   - Set reasonable limits, give positive feedback for acceptable   - Administer medications as ordered and monitor of potential side effects  Outcome: Progressing

## 2023-06-11 NOTE — NURSING NOTE
Patient is visible on the unit and is cooperative and pleasant  Patient is medication compliant  Patient denies SI, HI, AVH  Patient is social with staff and peers

## 2023-06-11 NOTE — PROGRESS NOTES
"Progress Note - [address] [facility] 21 y o  male MRN: [id number]  Unit/Bed#: -01 Encounter: [id number]    Assessment/Plan   Principal Problem:    MDD (major depressive disorder), recurrent episode, severe (HCC)  Active Problems:    Generalized anxiety disorder    Panic disorder    Insomnia    PTSD (post-traumatic stress disorder)      • Continue medications as noted below  • Recommend increasing Vraylar to 3 mg daily for improved control of auditory hallucinations and treatment resistant depression  o Patient declined increase today  • Continue to promote patient participation in therapeutic milieu  • Continue medical management by primary team   • Discharge disposition:  Patient tolerating medications, denying symptoms at this time, requesting ASAP discharge, continues to require inpatient psychiatric hospitalization    Subjective: The patient was evaluated this morning for continuity of care and no acute distress noted throughout the evaluation  Over the past 24 hours staff noted the patient was visible on the unit, social with select peers, cooperative with routine  The patient was meal and medication compliant and did not receive any as needed psychiatric medications over the past 24 hours  Today on exam the patient was pleasant and cooperative and reports mood is \"a lot better\" and elaborates he would like to be discharged to home ASAP as he does not believe the behavioral health unit is helpful for him due to the acuity of the unit  Patient adamantly denied suicidal ideation, intent, or plan and contracted for safety  Offered reassurance to patient hospitalization was a good idea based off of presenting symptoms and assured patient that weekday team will work with him to determine appropriate discharge planning  He endorsed good sleep, good appetite, and good energy  Patient reports occasional nonspecific auditory hallucinations of \"mumbling  \"  He denied suicidal or homicidal " ideation, denied visual hallucinations, and denied paranoid delusions  The patient denied adverse effects from his medication and was in agreement with plan to continue current medications      Current Medications:  Current Facility-Administered Medications   Medication Dose Route Frequency Provider Last Rate   • acetaminophen  650 mg Oral Q6H PRN Madyson Beverly PA-C     • acetaminophen  650 mg Oral Q4H PRN Madyson Beverly PA-C     • acetaminophen  975 mg Oral Q6H PRN Coralee José Powell PA-C     • aluminum-magnesium hydroxide-simethicone  30 mL Oral Q4H PRN Madyson Beverly PA-C     • amoxicillin  500 mg Oral [place], [place]     • haloperidol lactate  2 5 mg Intramuscular Q6H PRN Max 4/day [facility] [place] [place]      And   • LORazepam  1 mg Intramuscular Q6H PRN Max 4/day [facility] [place]      And   • benztropine  0 5 mg Intramuscular Q6H PRN Max 4/day [facility]     • haloperidol lactate  5 mg Intramuscular Q4H PRN Max 4/day [facility] ydia CONSUELO Jules      And   • LORazepam  2 mg Intramuscular Q4H PRN Max 4/day [facility] ydia CONSUELO Jules      And   • benztropine  1 mg Intramuscular Q4H PRN Max 4/day [facility] ydia CONSUELO Jules     • benztropine  1 mg Intramuscular Q4H PRN Max 6/day Coralee Myra Santiia CONSUELO Jules     • benztropine  1 mg Oral Q4H PRN Max 6/day Coralee José Clydia CONSUELO Jules     • bisacodyl  10 mg Rectal Daily PRN Agata Cochran MD     • cariprazine  1 5 mg Oral Daily Renea Pineda DO     • [START ON 6/12/2023] cholecalciferol  1,000 Units Oral Daily Judi Gudino PA-C     • [START ON 6/12/2023] cyanocobalamin  1,000 mcg Oral Daily Judi Gudino PA-C     • hydrOXYzine HCL  50 mg Oral Q6H PRN Max 4/day Coralfelicia Jules PA-C      Or   • diphenhydrAMINE  50 mg Intramuscular Q6H PRN Madyson Beverly PA-C     • famotidine  40 mg Oral HS Madyson Beverly PA-C     • glycerin-hypromellose-  1 drop Both Eyes Q3H PRN Edison Kumar CONSUELO Powell     • haloperidol  2 mg Oral Q4H PRN [facility] 6[facility]     • haloperidol  5 mg Oral Q6H PRN Max 4/day [place], [place]     • haloperidol  5 mg Oral Q4H PRN Max 4/day [place], [place]     • hydrOXYzine HCL  100 mg Oral Q6H PRN Max 4/day [place], [place]      Or   • LORazepam  2 mg Intramuscular Q6H PRN Ave CONSUELO oRsenberg     • hydrOXYzine HCL  25 mg Oral Q6H PRN Max 4/day [facility]     • meclizine  25 mg Oral Q8H PRN Judi Gudino PA-C     • melatonin  3 mg Oral HS PRN Carline Rosenberg PA-C     • nicotine polacrilex  2 mg Oral Q2H PRN Ave CONSUELO Rosenberg     • ondansetron  4 mg Oral Q6H PRN Judi Gudino PA-C     • pantoprazole  40 mg Oral Early Morning Ave CONSUELO Rosenberg     • polyethylene glycol  17 g Oral Daily PRN Azeb Paniagua MD     • propranolol  10 mg Oral Q8H PRN Ave CONSUELO Rosenberg     • senna-docusate sodium  1 tablet Oral Daily PRN Carline Rosenberg PA-C     • sertraline  200 mg Oral Daily [facility] Speaker Sherry PA-C     • sodium chloride  1 spray Each Nare Q1H PRN Judi Gudino PA-C     • traZODone  100 mg Oral HS Linda Wang DO         Behavioral Health Medications: all current active meds have been reviewed and continue current psychiatric medications  Vital signs in last 24 hours:  Temp:  [98 1 °F (36 7 °C)-98 6 °F (37 °C)] 98 1 °F (36 7 °C)  HR:  [] 70  Resp:  [18] 18  BP: (119-148)/(81-83) 119/83    Laboratory results:    I have personally reviewed all pertinent laboratory/tests results    Most Recent Labs:   Lab Results   Component Value Date    ALB 4 5 06/10/2023    ALKPHOS 88 06/10/2023    ALT 51 06/10/2023    AST 33 06/10/2023    BUN 17 06/10/2023    CALCIUM 10 2 06/10/2023    CHOLESTEROL 218 (H) 06/10/2023     06/10/2023    CO2 25 06/10/2023    CREATININE 0 99 06/10/2023    GLUC 126 06/10/2023    HCT 47 3 06/10/2023    HDL 44 06/10/2023    HGB 16 4 06/10/2023 "   K 3 7 06/10/2023    LDLCALC 142 (H) 06/10/2023    NEUTROABS 4 17 06/10/2023    NONHDLC 174 06/10/2023     06/10/2023    RBC 5 27 06/10/2023    RDW 12 1 06/10/2023    SODIUM 135 06/10/2023    TBILI 0 73 06/10/2023    TP 8 1 06/10/2023    TRIG 161 (H) 06/10/2023    [id number] 1 280 06/10/2023    WBC 6 97 06/10/2023       Psychiatric Review of Systems:  Behavior over the last 24 hours:  improved  Sleep: normal  Appetite: normal  Medication side effects: No   ROS: no complaints, all other systems are negative    Mental Status Evaluation:    Appearance:  age appropriate, casually dressed, overweight   Behavior:  pleasant, cooperative   Speech:  normal rate, normal volume, normal pitch   Mood:  \"A lot better\"   Affect:  normal range and intensity   Thought Process:  logical, linear   Associations: intact associations   Thought Content:  no overt delusions   Perceptual Disturbances: vague auditory hallucinations of \"mumbling\", no visual hallucinations, does not appear responding to internal stimuli   Risk Potential: Suicidal ideation - None at present  Homicidal ideation - None  Potential for aggression - No   Sensorium:  oriented to person, place and time/date   Memory:  recent and remote memory grossly intact   Consciousness:  alert and awake   Attention/Concentration: attention span and concentration are age appropriate   Insight:  limited   Judgment: limited   Gait/Station: normal gait/station, normal balance   Motor Activity: no abnormal movements         Progress Toward Goals: Progressing    Recommended Treatment:   See above for assessment and plan  Risks, benefits and possible side effects of Medications:   Risks, benefits, and possible side effects of medications explained to patient and patient verbalizes understanding  This note has been constructed using a voice recognition system  There may be translation, syntax, or grammatical errors   If you have any questions, please contact the " dictating provider      LANCE Patel

## 2023-06-11 NOTE — NURSING NOTE
Patient visible out and about in the community  He is social with select peers  He is polite and appropriate in conversation  Reports that his mood is good today and denies depression, anxiety, and suicidal thoughts at this time  No complaints of pain  Compliant with medications

## 2023-06-12 PROCEDURE — 99232 SBSQ HOSP IP/OBS MODERATE 35: CPT | Performed by: PSYCHIATRY & NEUROLOGY

## 2023-06-12 RX ADMIN — TRAZODONE HYDROCHLORIDE 100 MG: 100 TABLET ORAL at 20:57

## 2023-06-12 RX ADMIN — SERTRALINE 200 MG: 100 TABLET, FILM COATED ORAL at 09:11

## 2023-06-12 RX ADMIN — CYANOCOBALAMIN TAB 500 MCG 1000 MCG: 500 TAB at 09:11

## 2023-06-12 RX ADMIN — Medication 1000 UNITS: at 09:11

## 2023-06-12 RX ADMIN — FAMOTIDINE 40 MG: 20 TABLET, FILM COATED ORAL at 20:57

## 2023-06-12 RX ADMIN — PANTOPRAZOLE SODIUM 40 MG: 40 TABLET, DELAYED RELEASE ORAL at 05:24

## 2023-06-12 RX ADMIN — CARIPRAZINE 1.5 MG: 1.5 CAPSULE, GELATIN COATED ORAL at 09:11

## 2023-06-12 NOTE — PLAN OF CARE
Problem: Alteration in Thoughts and Perception  Goal: Treatment Goal: Gain control of psychotic behaviors/thinking, reduce/eliminate presenting symptoms and demonstrate improved reality functioning upon discharge  Outcome: Progressing  Goal: Verbalize thoughts and feelings  Description: Interventions:  - Promote a nonjudgmental and trusting relationship with the patient through active listening and therapeutic communication  - Assess patient's level of functioning, behavior and potential for risk  - Engage patient in 1 on 1 interactions  - Encourage patient to express fears, feelings, frustrations, and discuss symptoms    - Banks patient to reality, help patient recognize reality-based thinking   - Administer medications as ordered and assess for potential side effects  - Provide the patient education related to the signs and symptoms of the illness and desired effects of prescribed medications  Outcome: Progressing  Goal: Refrain from acting on delusional thinking/internal stimuli  Description: Interventions:  - Monitor patient closely, per order   - Utilize least restrictive measures   - Set reasonable limits, give positive feedback for acceptable   - Administer medications as ordered and monitor of potential side effects  Outcome: Progressing  Goal: Agree to be compliant with medication regime, as prescribed and report medication side effects  Description: Interventions:  - Offer appropriate PRN medication and supervise ingestion; conduct AIMS, as needed   Outcome: Progressing  Goal: Attend and participate in unit activities, including therapeutic, recreational, and educational groups  Description: Interventions:  -Encourage Visitation and family involvement in care  Outcome: Progressing  Goal: Recognize dysfunctional thoughts, communicate reality-based thoughts at the time of discharge  Description: Interventions:  - Provide medication and psycho-education to assist patient in compliance and developing insight into his/her illness   Outcome: Progressing  Goal: Complete daily ADLs, including personal hygiene independently, as able  Description: Interventions:  - Observe, teach, and assist patient with ADLS  - Monitor and promote a balance of rest/activity, with adequate nutrition and elimination   Outcome: Progressing     Problem: Risk for Self Injury/Neglect  Goal: Treatment Goal: Remain safe during length of stay, learn and adopt new coping skills, and be free of self-injurious ideation, impulses and acts at the time of discharge  Outcome: Progressing  Goal: Verbalize thoughts and feelings  Description: Interventions:  - Assess and re-assess patient's lethality and potential for self-injury  - Engage patient in 1:1 interactions, daily, for a minimum of 15 minutes  - Encourage patient to express feelings, fears, frustrations, hopes  - Establish rapport/trust with patient   Outcome: Progressing  Goal: Refrain from harming self  Description: Interventions:  - Monitor patient closely, per order  - Develop a trusting relationship  - Supervise medication ingestion, monitor effects and side effects   Outcome: Progressing  Goal: Attend and participate in unit activities, including therapeutic, recreational, and educational groups  Description: Interventions:  - Provide therapeutic and educational activities daily, encourage attendance and participation, and document same in the medical record  - Obtain collateral information, encourage visitation and family involvement in care   Outcome: Progressing  Goal: Recognize maladaptive responses and adopt new coping mechanisms  Outcome: Progressing  Goal: Complete daily ADLs, including personal hygiene independently, as able  Description: Interventions:  - Observe, teach, and assist patient with ADLS  - Monitor and promote a balance of rest/activity, with adequate nutrition and elimination  Outcome: Progressing     Problem: Depression  Goal: Treatment Goal: Demonstrate behavioral control of depressive symptoms, verbalize feelings of improved mood/affect, and adopt new coping skills prior to discharge  Outcome: Progressing  Goal: Verbalize thoughts and feelings  Description: Interventions:  - Assess and re-assess patient's level of risk   - Engage patient in 1:1 interactions, daily, for a minimum of 15 minutes   - Encourage patient to express feelings, fears, frustrations, hopes   Outcome: Progressing  Goal: Refrain from harming self  Description: Interventions:  - Monitor patient closely, per order   - Supervise medication ingestion, monitor effects and side effects   Outcome: Progressing  Goal: Refrain from isolation  Description: Interventions:  - Develop a trusting relationship   - Encourage socialization   Outcome: Progressing  Goal: Refrain from self-neglect  Outcome: Progressing  Goal: Attend and participate in unit activities, including therapeutic, recreational, and educational groups  Description: Interventions:  - Provide therapeutic and educational activities daily, encourage attendance and participation, and document same in the medical record   Outcome: Progressing  Goal: Complete daily ADLs, including personal hygiene independently, as able  Description: Interventions:  - Observe, teach, and assist patient with ADLS  -  Monitor and promote a balance of rest/activity, with adequate nutrition and elimination   Outcome: Progressing     Problem: Anxiety  Goal: Anxiety is at manageable level  Description: Interventions:  - Assess and monitor patient's anxiety level  - Monitor for signs and symptoms (heart palpitations, chest pain, shortness of breath, headaches, nausea, feeling jumpy, restlessness, irritable, apprehensive)  - Collaborate with interdisciplinary team and initiate plan and interventions as ordered    - [place] patient to unit/surroundings  - Explain treatment plan  - Encourage participation in care  - Encourage verbalization of concerns/fears  - Identify coping mechanisms  - Assist in developing anxiety-reducing skills  - Administer/offer alternative therapies  - Limit or eliminate stimulants  Outcome: Progressing

## 2023-06-12 NOTE — NURSING NOTE
Patient is medication compliant this shift  Patient is cooperative and pleasant  Patient is visible on the unit and participates in group  Patient is social with staff and peers  Patient denies SI, HI, AVH

## 2023-06-12 NOTE — PROGRESS NOTES
06/12/23 0730   Activity/Group Checklist   Group   (Morning Meeting and Coffee)   Attendance Attended   Attendance Duration (min) 46-60   Interactions Interacted appropriately   Affect/Mood Appropriate;Calm   Goals Achieved Identified feelings; Discussed coping strategies; Able to listen to others; Able to engage in interactions; Able to reflect/comment on own behavior;Able to manage/cope with feelings

## 2023-06-12 NOTE — PROGRESS NOTES
06/12/23   Team Meeting   Meeting Type Tx Team Meeting   Team Members Present   Team Members Present Physician;Nurse;   Physician Team Member Dr Nadeem Beckham MD   Nursing Team Member Mary Gary RN   Social Work Team Member Yani LAKE   Patient/Family Present   Patient Present Yes   Patient's Family Present No       Treatment team met with pt to review care plan and goals  All in agreement with treatment plan; all signed

## 2023-06-12 NOTE — PROGRESS NOTES
06/12/23   Team Meeting   Meeting Type Daily Rounds   Team Members Present   Team Members Present Physician;Nurse;   Physician Team Member Dr Jane Bernal MD; [address]  Team Member Belen Terry RN   Social Work Team Member Tory Rincon [facility]   Patient/Family Present   Patient Present No   Patient's Family Present No     Pt slept last pm  Denied s/I  minimizes all

## 2023-06-12 NOTE — PROGRESS NOTES
"Progress Note - Wallace Jeffers 21 y o  male MRN: [id number]    Unit/Bed#: [facility] 245-01 Encounter: [id number]        Subjective:   Patient seen and examined at bedside after reviewing the chart and discussing the case with the caring staff  Patient has no acute symptoms  Physical Exam   Vitals: Blood pressure 133/84, pulse 100, temperature 98 8 °F (37 1 °C), temperature source Temporal, resp  rate 16, height 5' 11\" (1 803 m), weight 110 kg (243 lb), SpO2 95 %  ,Body mass index is 33 89 kg/m²  Constitutional: Patient in no acute distress  HEENT: PERR, EOMI, MMM  Cardiovascular: Normal rate and regular rhythm  Pulmonary/Chest: Effort normal and breath sounds normal    Abdomen: Non distended  Assessment/Plan:  Wallace Jeffers is a(n) 21 y o  male with MDD      1  GERD  Continue Protonix 40 mg daily, Pepcid 40 mg daily at bedtime  2  Vertigo  Stable  Meclizine as needed  3  Insomnia  Patient is on trazodone at bedtime  4  Vitamin D deficiency  Patient started on vitamin D 1000 units daily  5  Vitamin B12 deficiency  Patient started on vitamin B12 supplement  6  URI  Patient is finishing up amoxicillin 500 mg q8h x 10 days which was prescribed outpatient on 6/1/2023  Saline nasal spray as needed    "

## 2023-06-12 NOTE — PROGRESS NOTES
06/12/23 1030   Activity/Group Checklist   Group   (TheThings That Matter To Us Most Art Therapy)   Attendance Attended   Attendance Duration (min) Greater than 60   Interactions Interacted appropriately   Affect/Mood Appropriate;Bright;Calm   Goals Achieved Identified feelings; Identified triggers; Identified relapse prevention strategies; Discussed coping strategies; Identified resources and support systems; Able to listen to others; Able to engage in interactions; Able to reflect/comment on own behavior;Able to manage/cope with feelings

## 2023-06-12 NOTE — ED NOTES
Insurance Authorization for admission:   Phone call placed to [facility]  Phone number: [phone]  Automated message indicates to fax clinical information for review  Clinical faxed to [phone] as requested           [facility]  06/12/23    5970

## 2023-06-12 NOTE — NURSING NOTE
No noted suicidal ideations or homicidal behaviors overnight  Fluids at bedside to promote hydration  No aspiration risks noted  Patient observed sleeping during most q 7 minute safety checks  No observable distress or changes in medical condition  No complaints of pain  Will continue to monitor

## 2023-06-12 NOTE — PROGRESS NOTES
Progress Note - [address] [facility] 21 y o  male MRN: @MRN   Unit/Bed#: [facility] 245-01 Encounter: [id number]      Report from staff regarding this patient received and discussed, and records reviewed prior to seeing this patient  Behavior over the last 24 hours: improving  Visible on the unit  Communicated with selected peers  He did not have any self harmful or aggressive or agitated acts while in the unit  Patient remains anxious and appropriate today  Sleep: improved  Appetite: fair  Medication side effects: No       Mental Status Evaluation:    Appearance:  casually dressed   Mood:  improved, anxious   Affect: normal range and intensity, appropriate, reactive    Speech:  normal rate and volume, normal pitch, fluent   Thought Content:  normal   Perceptual Disturbances: no auditory hallucinations, no visual hallucinations, denies auditory hallucinations when asked, does not appear responding to internal stimuli   Risk Potential: Suicidal ideation - None, contracts for safety on the unit   Insight:  improved   Motor Activity: no abnormal movements       Laboratory results:  I have personally reviewed all pertinent laboratory results  Progress Toward Goals: improved    Assessment/Plan   Principal Problem:    MDD (major depressive disorder), recurrent episode, severe (HCC)  Active Problems:    Generalized anxiety disorder    Panic disorder    Insomnia    PTSD (post-traumatic stress disorder)    Recommended Treatment: No medication changes was provided, because the patient already improving  Supportive therapy was provided  The patient denied any suicidal thoughts, stated that the voices he heard were calling his name but did not have any command nature  Denied hearing voices today  Planned medication and treatment changes: All current active medications have been reviewed  Continue treatment with group therapy, milieu therapy, occupational therapy and medication management        ** Please Note: This note has been constructed using a voice recognition system   **      BMP:   Recent Labs     06/10/23  0914   BUN 17      CO2 25   K 3 7     Vitals:    06/12/23 1517   BP:    Pulse: (!) 106   Resp:    Temp:    SpO2:         Medication Administration - last 24 hours from 06/11/2023 1607 to 06/12/2023 1607       Date/Time Order Dose Route Action Action by     06/11/2023 2111 EDT famotidine (PEPCID) tablet 40 mg 40 mg Oral Given [employer], LPN     19/53/7160 9200 EDT pantoprazole (PROTONIX) EC tablet 40 mg 40 mg Oral Given Aliyah Luna RN     06/12/2023 0911 EDT sertraline (ZOLOFT) tablet 200 mg 200 mg Oral Given Karen Lorenzo LPN     59/53/0793 7471 EDT cariprazine (VRAYLAR) capsule 1 5 mg 1 5 mg Oral Given Karen Lorenzo LPN     25/30/6555 9592 EDT traZODone (DESYREL) tablet 100 mg 100 mg Oral Given [employer]OSH     05/41/9310 0034 EDT cholecalciferol (VITAMIN D3) tablet 1,000 Units 1,000 Units Oral Given Minoo Ledbetter LPN     74/19/3811 2536 EDT cyanocobalamin (VITAMIN B-12) tablet 1,000 mcg 1,000 mcg Oral Given Minoo Ledbetter LPN

## 2023-06-12 NOTE — NURSING NOTE
"Patient visible and social with peers on the unit  Patient stated that he does not have any SI/HI/AVH depression and anxiety  He was forthcoming with regretting the SI statements he made  He stated that he normally \"just goes to sleep and the thoughts pass  \" He also stated he punched a wall in frustration over not being able to find his medication, which he also regrets  The patient would like to start therapy \"to get to the root of my problems,\" and would like help finding a therapist  Patient is medication and meal compliant  Pleasant and cooperative  Continuous visual safety checks performed throughout the shift  All safety precautions maintained    "

## 2023-06-13 PROCEDURE — 99232 SBSQ HOSP IP/OBS MODERATE 35: CPT | Performed by: PSYCHIATRY & NEUROLOGY

## 2023-06-13 RX ORDER — TRAZODONE HYDROCHLORIDE 50 MG/1
50 TABLET ORAL
Status: DISCONTINUED | OUTPATIENT
Start: 2023-06-13 | End: 2023-06-14 | Stop reason: HOSPADM

## 2023-06-13 RX ORDER — SERTRALINE HYDROCHLORIDE 100 MG/1
200 TABLET, FILM COATED ORAL DAILY
Qty: 60 TABLET | Refills: 1 | Status: SHIPPED | OUTPATIENT
Start: 2023-06-13 | End: 2023-08-12

## 2023-06-13 RX ORDER — TRAZODONE HYDROCHLORIDE 100 MG/1
100 TABLET ORAL
Qty: 30 TABLET | Refills: 1 | Status: SHIPPED | OUTPATIENT
Start: 2023-06-13 | End: 2023-08-12

## 2023-06-13 RX ADMIN — SERTRALINE 200 MG: 100 TABLET, FILM COATED ORAL at 08:43

## 2023-06-13 RX ADMIN — TRAZODONE HYDROCHLORIDE 100 MG: 100 TABLET ORAL at 21:26

## 2023-06-13 RX ADMIN — CARIPRAZINE 1.5 MG: 1.5 CAPSULE, GELATIN COATED ORAL at 08:43

## 2023-06-13 RX ADMIN — ACETAMINOPHEN 650 MG: 325 TABLET ORAL at 20:10

## 2023-06-13 RX ADMIN — CARIPRAZINE 1.5 MG: 1.5 CAPSULE, GELATIN COATED ORAL at 13:15

## 2023-06-13 RX ADMIN — PANTOPRAZOLE SODIUM 40 MG: 40 TABLET, DELAYED RELEASE ORAL at 05:16

## 2023-06-13 RX ADMIN — FAMOTIDINE 40 MG: 20 TABLET, FILM COATED ORAL at 21:26

## 2023-06-13 RX ADMIN — Medication 1000 UNITS: at 08:42

## 2023-06-13 RX ADMIN — CYANOCOBALAMIN TAB 500 MCG 1000 MCG: 500 TAB at 08:42

## 2023-06-13 RX ADMIN — TRAZODONE HYDROCHLORIDE 50 MG: 50 TABLET ORAL at 21:50

## 2023-06-13 NOTE — PROGRESS NOTES
Pt has the following items in his room    1 pair of Crocs 1 gray t shirt 1 pair of socks 1 pay of under are 1 pair of jeans
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
## 2023-06-13 NOTE — PROGRESS NOTES
06/13/23   Team Meeting   Meeting Type Daily Rounds   Team Members Present   Team Members Present Physician;Nurse;   Physician Team Member Dr Dion Jacobo MD; [address]   Nursing Team Member Chivo Olivera RN   Social Work Team Member Xuan LAKE   Patient/Family Present   Patient Present No   Patient's Family Present No

## 2023-06-13 NOTE — PROGRESS NOTES
"Progress Note - Kalina Lemos 21 y o  male MRN: [id number]    Unit/Bed#: [facility] 245-01 Encounter: [id number]        Subjective:   Patient seen and examined at bedside after reviewing the chart and discussing the case with the caring staff  Patient has no acute symptoms  Physical Exam   Vitals: Blood pressure 117/63, pulse 84, temperature 98 3 °F (36 8 °C), temperature source Temporal, resp  rate 18, height 5' 11\" (1 803 m), weight 110 kg (243 lb), SpO2 96 %  ,Body mass index is 33 89 kg/m²  Constitutional: Patient in no acute distress  HEENT: PERR, EOMI, MMM  Cardiovascular: Normal rate and regular rhythm  Pulmonary/Chest: Effort normal and breath sounds normal    Abdomen: Non distended  Assessment/Plan:  Kalina Lemos is a(n) 21 y o  male with MDD      1  GERD  Continue Protonix 40 mg daily, Pepcid 40 mg daily at bedtime  2  Vertigo  Stable  Meclizine as needed  3  Insomnia  Patient is on trazodone at bedtime  4  Vitamin D deficiency  Patient started on vitamin D 1000 units daily  5  Vitamin B12 deficiency  Patient started on vitamin B12 supplement  6  URI  Patient is finishing up amoxicillin 500 mg q8h x 10 days which was prescribed outpatient on 6/1/2023  Saline nasal spray as needed    "

## 2023-06-13 NOTE — PROGRESS NOTES
"Progress Note - [address] [facility] 21 y o  male MRN: [id number]  Unit/Bed#: -01 Encounter: [id number]    Assessment/Plan   Principal Problem:    MDD (major depressive disorder), recurrent episode, severe (HCC)  Active Problems:    Generalized anxiety disorder    Panic disorder    Insomnia    PTSD (post-traumatic stress disorder)      Behavior over the last 24 hours:  unchanged  Sleep: normal  Appetite: normal  Medication side effects: No  ROS: no complaints and all other systems are negative    Fatemeh Bustamante was seen today for psychiatric follow-up  Remains visible in the milieu and social with peers; attends and participates in group appropriately  Denies current SI and stated \"I never really had a plan, they just asked me what my plan would be in the ER and I said shoot myself  A lot was going on at the time and it was a dumb thing to say  \"  Patient described having increased anxiety and depression with inability to find his Atarax to prevent anxiety attack prior to hospitalization  \"I punched a wall then and didn't even realize until after my panic attack  \"  Reports he was agreeable to come into the hospital for medication adjustment  While on unit, Fatemeh Bustamante has been maintaining safety with no threats or gestures of SI  Continues to endorse vague AH that are described as \"mumbling and sounds like someone calling my name from [employer]  \"  Denies VH/HI, nor did patient appear internally preoccupied  Thoughts were organized and goal directed with no delusional content verbalized identified an adequate safety plan to utilize post discharge and exhibits forward thought process with plan to return to work  Describes family and friends of strong protective factors against suicide      Mental Status Evaluation:  Appearance:  age appropriate and casually dressed   Behavior:  cooperative, social, good eye contact   Speech:  normal pitch and normal volume   Mood:  \"Good\"   Affect:  constricted and " "mood-congruent   Thought Process:  goal directed and Organized   Associations: circumstantial associations   Thought Content:  No overt delusions or paranoia verbalized   Perceptual Disturbances: Vague AH described as \"mumbling\" and \"less prevalent\"   Denies VH   Risk Potential: Suicidal Ideations none  Homicidal Ideations none  Potential for Aggression No   Sensorium:  person, place, time/date and situation   Memory:  recent and remote memory grossly intact   Consciousness:  alert and awake    Attention: attention span and concentration were age appropriate   Insight:  Limited,but improving   Judgment: limited but improving   Gait/Station: normal gait/station and normal balance   Motor Activity: no abnormal movements     Progress Toward Goals: Continues to exhibit improvement  Will further titrate Vraylar 3 mg PO QD residual AH, depression, and mood stabilization  Otherwise, continue remainder psychotropic regimen as ordered  At this time, patient continues to maintain safety and denies SI  Thoughts are forward thinking and able to describe adequate safety plan and protective factors  Plan is to discharge home tomorrow, 6/14/2023, and follow-up with outpatient psychiatry  Recommended Treatment: Continue with group therapy, milieu therapy and occupational therapy  Risks, benefits and possible side effects of Medications:   Risks, benefits, and possible side effects of medications explained to patient and patient verbalizes understanding        Medications:   Increase Vraylar 3mg PO QD  all current active meds have been reviewed and current meds:   Current Facility-Administered Medications   Medication Dose Route Frequency   • acetaminophen (TYLENOL) tablet 650 mg  650 mg Oral Q6H PRN   • acetaminophen (TYLENOL) tablet 650 mg  650 mg Oral Q4H PRN   • acetaminophen (TYLENOL) tablet 975 mg  975 mg Oral Q6H PRN   • aluminum-magnesium hydroxide-simethicone (MYLANTA) oral suspension 30 mL  30 mL Oral Q4H PRN " • haloperidol lactate (HALDOL) injection 2 5 mg  2 5 mg Intramuscular Q6H PRN Max 4/day    And   • LORazepam (ATIVAN) injection 1 mg  1 mg Intramuscular Q6H PRN Max 4/day    And   • benztropine (COGENTIN) injection 0 5 mg  0 5 mg Intramuscular Q6H PRN Max 4/day   • haloperidol lactate (HALDOL) injection 5 mg  5 mg Intramuscular Q4H PRN Max 4/day    And   • LORazepam (ATIVAN) injection 2 mg  2 mg Intramuscular Q4H PRN Max 4/day    And   • benztropine (COGENTIN) injection 1 mg  1 mg Intramuscular Q4H PRN Max 4/day   • benztropine (COGENTIN) injection 1 mg  1 mg Intramuscular Q4H PRN Max 6/day   • benztropine (COGENTIN) tablet 1 mg  1 mg Oral Q4H PRN Max 6/day   • bisacodyl (DULCOLAX) rectal suppository 10 mg  10 mg Rectal Daily PRN   • [START ON 6/14/2023] cariprazine (VRAYLAR) capsule 3 mg  3 mg Oral Daily   • cholecalciferol (VITAMIN D3) tablet 1,000 Units  1,000 Units Oral Daily   • cyanocobalamin (VITAMIN B-12) tablet 1,000 mcg  1,000 mcg Oral Daily   • hydrOXYzine HCL (ATARAX) tablet 50 mg  50 mg Oral Q6H PRN Max 4/day    Or   • diphenhydrAMINE (BENADRYL) injection 50 mg  50 mg Intramuscular Q6H PRN   • famotidine (PEPCID) tablet 40 mg  40 mg Oral HS   • glycerin-hypromellose- (ARTIFICIAL TEARS) ophthalmic solution 1 drop  1 drop Both Eyes Q3H PRN   • haloperidol (HALDOL) tablet 2 mg  2 mg Oral Q4H PRN Max 6/day   • haloperidol (HALDOL) tablet 5 mg  5 mg Oral Q6H PRN Max 4/day   • haloperidol (HALDOL) tablet 5 mg  5 mg Oral Q4H PRN Max 4/day   • hydrOXYzine HCL (ATARAX) tablet 100 mg  100 mg Oral Q6H PRN Max 4/day    Or   • LORazepam (ATIVAN) injection 2 mg  2 mg Intramuscular Q6H PRN   • hydrOXYzine HCL (ATARAX) tablet 25 mg  25 mg Oral Q6H PRN Max 4/day   • meclizine (ANTIVERT) tablet 25 mg  25 mg Oral Q8H PRN   • nicotine polacrilex (NICORETTE) gum 2 mg  2 mg Oral Q2H PRN   • ondansetron (ZOFRAN-ODT) dispersible tablet 4 mg  4 mg Oral Q6H PRN   • pantoprazole (PROTONIX) EC tablet 40 mg  40 mg Oral Early Morning   • polyethylene glycol (MIRALAX) packet 17 g  17 g Oral Daily PRN   • propranolol (INDERAL) tablet 10 mg  10 mg Oral Q8H PRN   • senna-docusate sodium (SENOKOT S) 8 6-50 mg per tablet 1 tablet  1 tablet Oral Daily PRN   • sertraline (ZOLOFT) tablet 200 mg  200 mg Oral Daily   • sodium chloride (OCEAN) 0 65 % nasal spray 1 spray  1 spray Each Nare Q1H PRN   • traZODone (DESYREL) tablet 100 mg  100 mg Oral HS   • traZODone (DESYREL) tablet 50 mg  50 mg Oral HS PRN     Labs: I have personally reviewed all pertinent laboratory/tests results  CBC:   Lab Results   Component Value Date    HCT 47 3 06/10/2023    HGB 16 4 06/10/2023    MCH 31 1 06/10/2023    MCHC 34 7 06/10/2023    MCV 90 06/10/2023    MPV 10 6 06/10/2023    NEUTROABS 4 17 06/10/2023     06/10/2023    RBC 5 27 06/10/2023    RDW 12 1 06/10/2023    WBC 6 97 06/10/2023     CMP:   Lab Results   Component Value Date    AGAP 10 06/10/2023    ALB 4 5 06/10/2023    ALKPHOS 88 06/10/2023    ALT 51 06/10/2023    AST 33 06/10/2023    BUN 17 06/10/2023    CALCIUM 10 2 06/10/2023     06/10/2023    CO2 25 06/10/2023    CREATININE 0 99 06/10/2023    EGFR 106 06/10/2023    GLUC 126 06/10/2023    K 3 7 06/10/2023    SODIUM 135 06/10/2023    TBILI 0 73 06/10/2023    TP 8 1 06/10/2023     Drug Screen:   Lab Results   Component Value Date    AMPMETHUR Negative 06/08/2023    BARBTUR Negative 06/08/2023    BDZUR Negative 06/08/2023    COCAINEUR Negative 06/08/2023    METHADONEUR Negative 06/08/2023    OPIATEUR Negative 06/08/2023    PCPUR Negative 06/08/2023    THCUR Negative 06/08/2023     Counseling / Coordination of Care  Total floor / unit time spent today 30 minutes  Greater than 50% of total time was spent with the patient and / or family counseling and / or coordination of care   A description of the counseling / coordination of care: Medication education, treatment plan, supportive therapy, safety/discharge planning

## 2023-06-13 NOTE — DISCHARGE INSTR - OTHER ORDERS
Arniebernice Richards, you are being discharged to your home at [address] in [address] [place] [id number]  Your contact number is [phone]  Triggers you have identified during your hospitalization that led to your admission include a distressed mood  Coping skills you have identified during your hospitalization include reading and playing video games  If you are unable to deal with your distressed mood alone please contact your mother Marva Mayberry at [phone], [facility] at [phone], or Cira Anthony [facility] at [phone]  If that is not effective and you continue to have a distressed mood, feel overwhelmed, or are in crisis, please contact [facility]: [phone], dial [phone], or go to the nearest emergency center  [facility] Crisis: [phone]  [facility] Drug and Alcohol: [phone] [facility] [facility] [address]:  [phone]  *Alcohol Anonymous: [address] on [address] [facility] ([facility]) [employer]: 228.464.4750/Website: www adolfo org  *Substance Abuse and [address] [facility]) [employer], which is a confidential, free, 24-hour-a-day, 365-day-a-year, information service for individuals and family members facing mental health and/or substance use disorders  This service provides referrals to local treatment facilities, support groups, and community-based organizations  Callers can also order free publications and other information  Call 8-498.645.7696/Website: www [facility] gov  *[facility] 2-1-1: This is a toll free, confidential, 24-hour-a-day service which connects you to a community  in your area who can help you find services and resources that are available to you locally and provide critical services that can improve and save lives    Call: 211  Vicente Moe: https://debby-saida newsome/    Cortez Rodriguez or Hailey, juan francisco Pickard, will be calling you after your discharge, on the phone number that you provided  They will be available as an additional support, if needed  If you wish to speak with one of them, you may contact Merline Herman at [phone] or Chilo Henderson at [phone]

## 2023-06-13 NOTE — PROGRESS NOTES
06/13/23 0730   Activity/Group Checklist   Group   (Morning Meeting and Coffee)   Attendance Attended   Attendance Duration (min) 16-30   Interactions Interacted appropriately   Affect/Mood Appropriate;Bright;Calm   Goals Achieved Identified feelings; Discussed coping strategies; Able to listen to others; Able to engage in interactions; Identified resources and support systems; Able to reflect/comment on own behavior;Able to manage/cope with feelings

## 2023-06-13 NOTE — NURSING NOTE
"Patient observed within the milieu, social and pleasant with peers and staff  He admits to intermittent suicidal thoughts but denies intent, he states he is not sure why he has them because he reports \"a low stress life with a lot to live for\"  He reports anxiety regarding miniscule things at times but states his medication has helped him improve, ongoing intermittent AH but states they are not command in nature and are not distressing, he describes them as whispers or mumbles of his name or his mother's voice  Reports goals and aspirations, hopes to continue care and go to therapy with OP psychiatrist  Jabari Spain with medication as ordered  Appetite and sleep improved  q7 minute monitoring ongoing     "

## 2023-06-13 NOTE — NURSING NOTE
Patient visible and social on the unit  Attends group  Polite and cooperative  Meal and medication compliant  Patient denies SI/HI/AVH depression and anxiety  Patient is looking forward to discharge tomorrow  Continuous visual safety checks performed throughout the shift  All safety precautions maintained

## 2023-06-13 NOTE — SOCIAL WORK
SW placed call to pt's mother Ace Mills at [phone] to discuss pt's access to guns in the household  Pt's mother stated that she and her , pt's stepfather, locked all guns in a closet on Saturday SW to convey to team

## 2023-06-13 NOTE — DISCHARGE INSTR - AVS FIRST PAGE
You were prescribed a 30-day supply of psychotropic medications with 1 refill to cover until medication management appointment

## 2023-06-14 ENCOUNTER — TRANSITIONAL CARE MANAGEMENT (OUTPATIENT)
Dept: FAMILY MEDICINE CLINIC | Facility: CLINIC | Age: 23
End: 2023-06-14

## 2023-06-14 VITALS
OXYGEN SATURATION: 94 % | WEIGHT: 243 LBS | DIASTOLIC BLOOD PRESSURE: 77 MMHG | TEMPERATURE: 98 F | HEART RATE: 80 BPM | SYSTOLIC BLOOD PRESSURE: 116 MMHG | HEIGHT: 71 IN | BODY MASS INDEX: 34.02 KG/M2 | RESPIRATION RATE: 18 BRPM

## 2023-06-14 PROBLEM — F33.2 MDD (MAJOR DEPRESSIVE DISORDER), RECURRENT EPISODE, SEVERE (HCC): Status: RESOLVED | Noted: 2023-06-10 | Resolved: 2023-06-14

## 2023-06-14 PROBLEM — G47.00 INSOMNIA: Status: RESOLVED | Noted: 2023-06-10 | Resolved: 2023-06-14

## 2023-06-14 PROCEDURE — 99238 HOSP IP/OBS DSCHRG MGMT 30/<: CPT | Performed by: NURSE PRACTITIONER

## 2023-06-14 RX ORDER — ONDANSETRON 4 MG/1
4 TABLET, FILM COATED ORAL EVERY 8 HOURS PRN
Qty: 20 TABLET | Refills: 0 | Status: SHIPPED | OUTPATIENT
Start: 2023-06-14

## 2023-06-14 RX ORDER — MELATONIN
1000 DAILY
Qty: 30 TABLET | Refills: 0 | Status: SHIPPED | OUTPATIENT
Start: 2023-06-15

## 2023-06-14 RX ORDER — MECLIZINE HYDROCHLORIDE 25 MG/1
25 TABLET ORAL EVERY 8 HOURS PRN
Qty: 30 TABLET | Refills: 0 | Status: SHIPPED | OUTPATIENT
Start: 2023-06-14

## 2023-06-14 RX ORDER — FAMOTIDINE 20 MG/1
40 TABLET, FILM COATED ORAL
Qty: 30 TABLET | Refills: 0 | Status: SHIPPED | OUTPATIENT
Start: 2023-06-14

## 2023-06-14 RX ORDER — ECHINACEA PURPUREA EXTRACT 125 MG
1 TABLET ORAL
Qty: 104 ML | Refills: 0 | Status: SHIPPED | OUTPATIENT
Start: 2023-06-14

## 2023-06-14 RX ORDER — OMEPRAZOLE 40 MG/1
40 CAPSULE, DELAYED RELEASE ORAL DAILY
Qty: 90 CAPSULE | Refills: 0 | Status: SHIPPED | OUTPATIENT
Start: 2023-06-14

## 2023-06-14 RX ADMIN — PANTOPRAZOLE SODIUM 40 MG: 40 TABLET, DELAYED RELEASE ORAL at 05:22

## 2023-06-14 RX ADMIN — CARIPRAZINE 3 MG: 1.5 CAPSULE, GELATIN COATED ORAL at 08:55

## 2023-06-14 RX ADMIN — SERTRALINE 200 MG: 100 TABLET, FILM COATED ORAL at 08:55

## 2023-06-14 RX ADMIN — Medication 1000 UNITS: at 08:55

## 2023-06-14 RX ADMIN — CYANOCOBALAMIN TAB 500 MCG 1000 MCG: 500 TAB at 08:55

## 2023-06-14 NOTE — BH TRANSITION RECORD
Contact Information: If you have any questions, concerns, pended studies, tests and/or procedures, or emergencies regarding your inpatient behavioral health visit  Please contact Sam Casas[facility] behavioral health unit [phone] and ask to speak to a , nurse or physician  A contact is available 24 hours/ 7 days a week at this number  Summary of Procedures Performed During your Stay:  Below is a list of major procedures performed during your hospital stay and a summary of results:  - No major procedures performed  Pending Studies (From admission, onward)    None        Please follow up on the above pending studies with your PCP and/or referring provider  Satisfactory

## 2023-06-14 NOTE — PROGRESS NOTES
06/13/23 1300   Activity/Group Checklist   Group   (Self Reflection Art Therapy)   Attendance Attended   Attendance Duration (min) Greater than 60   Interactions Interacted appropriately   Affect/Mood Appropriate;Bright;Calm   Goals Achieved Identified feelings; Discussed coping strategies; Identified resources and support systems; Able to listen to others; Able to engage in interactions; Able to reflect/comment on own behavior;Able to manage/cope with feelings

## 2023-06-14 NOTE — NURSING NOTE
Patient noted to be present on the milieu  Social w/ staff and peers  Maintained pleasant and cooperative demeanor during interaction  Denied SI/HI/AVH, depression and anxiety during assessment  Vocalized being ready for discharge tomorrow  Patient reports staff positively exceeding his expectations  Compliant w/ medication regimen  Utilized prn Tylenol 650 at 2010 for headache rated 4/10 w/ positive effect 1 hr s/p  Utilized prn Trazodone 50 mg at hs for trouble w/ sleep  No acute behaviors noted nor reported  Q 7 min safety checks continuous and maintained

## 2023-06-14 NOTE — PROGRESS NOTES
06/13/23 1000   Activity/Group Checklist   Group   (Self Assessment and Relapse Prevention Plan)   Attendance Attended   Attendance Duration (min) 31-45   Interactions Interacted appropriately   Affect/Mood Appropriate   Goals Achieved Identified feelings; Identified triggers; Identified relapse prevention strategies; Discussed coping strategies; Increased hopefulness; Identified resources and support systems; Able to listen to others; Able to engage in interactions; Able to reflect/comment on own behavior;Able to manage/cope with feelings; Able to self-disclose     AT met with pt to complete self assessment and relapse prevention plan  PT displayed a bright mood and affect, maintained good eye contact and was pleasant and cooperative throughout interview  PT identified his biggest stressor leading to this admission as his depression and self reflection  His greatest current challenge is none at the moment  What he likes most about life is his family and stated that they are the ones who make life meaningful  What he likes least about life is his weight and stated that he always struggled with his self image  PT completed high school and attended college and reports that he currently is working as a   PT enjoys reading, music, gardening, computers and social media  PT would like to learn more about coping with the symptoms of his illness, and knowledge about his illness and medications  Upon D/C pt would like to be able to see his family again and go back to work  PT also completed the RP plan where he identified his warning signs to reach out for help as when he holds his wrist and when he becomes quiet  PT's positive coping skills include reading and video games  His mother is his main support person  PT has been attending most art therapy groups and is social within the community

## 2023-06-14 NOTE — PROGRESS NOTES
06/14/23 0730   Activity/Group Checklist   Group   (Morning Meeting and Coffee)   Attendance Attended   Attendance Duration (min) 46-60   Interactions Interacted appropriately   Affect/Mood Appropriate;Bright   Goals Achieved Identified feelings; Discussed coping strategies; Increased hopefulness; Able to listen to others; Able to engage in interactions; Able to reflect/comment on own behavior;Able to manage/cope with feelings

## 2023-06-14 NOTE — NURSING NOTE
Patient discharged to home  Patient verbalized understanding of all discharge instructions and medication  Patient denies any SI  Safety plan reviewed and reinforced  All belongings accounted for and returned to patient

## 2023-06-14 NOTE — NURSING NOTE
Belongings returned to patient    t shirt x 2  Jeans x 1   Crocs x 1 pair   Sneakers x 1 pair   Cell phone x 1  Rag x 1   Shorts x 1   Underwear x 2  socks x 2  Wallet x 1  Insurance card x 2  Drivers license x 1  Debit card x 3

## 2023-06-14 NOTE — DISCHARGE SUMMARY
Discharge Summary - [id number] [facility] 21 y o  male MRN: [id number]  Unit/Bed#: [facility] 245-01 Encounter: [id number]     Admission Date: 6/9/2023         Discharge Date: 6/14/23    Attending Psychiatrist: Aide Nicole MD    Reason for Admission/HPI: Mood disorder [facility]) [F39]      According to H&P by Dr Minerva Hardin 6/10/23:    History of Present Illness     Melissa Do is a 21 y o  male, presenting to [place] [id number] , possessing pertinent psychiatric history of MDD, GIOVANI, PTSD, and insomnia, subsequent to worsening anxiety and suicidal ideation with a nonspecific plan  Per ED department note Soniya Womack MD on 508/23:  80-year-old male who presents for hand pain as well as SI with plan   Patient states that things little set him off tonight, was not specific as to what, and he then punched a wall with his right hand   Patient is concerned that he may have fractured something in his hand   He denies any numbness going to his, tingling   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  He denies any wrist pain   He states that he does have SI, and he does have a plan at home, he has access to a firearm and states that he thought about shooting himself   ROS otherwise negative      Per crisis evaluation note by Jacki Faulkner on 06/09/23:  Pt presented to the ED with SI with a plan to shoot himself  Pt stated that he has been struggling with his anxiety and depression lately  Pt stated that the symptoms have been worse over the last few days  Pt stated that he came to the ED because he knew he needed help  Pt stated that he owns firearms but they are locked away in a safe at his parents home  Pt stated that what triggered his anxiety and depression today was when he was no able to find his medications   The Pt stated that he was looking for them and when he could not find him it sent him into a anxiety attack which led to "his SI  Pt did state that he is currently seeing Dr Princess Pinto for Psychiatry  Pt stated that he is not participating in any individual therapy because he was only offered virtual  Pt stated that he would like to have therapy as long as it is in person  Pt and [facility] talked about inpatient services   Pt is also having both Auditory and Visual Hallucinations  Pt stated that hears people telling him to harm himself  Pt did not go into any further detail   Pt would like to go inpatient and is willing to sign a 201  [facility] explained the 201 process and the rights  Pt understood the process and his rights  201 will be completed and faxed to charge nurse for signatures by the Pt and the Doctor      The patient was admitted to the behavioral health unit for safety and monitoring, medication management, group and milieu therapy, and disposition planning  On evaluation today the patient was alert and oriented, pleasant and cooperative, and offered appropriate responses  The patient endorsed prior to admission symptoms of increased anger, irritability, depression, anxiety, panic attack, and suicidal ideation without plan  He reports getting into an argument with friends and his girlfriend and then developed an anxiety attack  He reports he was unable to find PRN Atarax so he punched a wall and told family he was suicidal   In the ED he continued to report suicidal ideation but now says \" I said something I shouldn't have  \"  Despite prior to admission suicidal ideation the patient adamantly denies current suicidal ideation, intent, or plan and contracts for safety \"I could never take my life  \"  The patient is followed by outpatient psychiatrist Natalie Pabon and reports compliance with Zoloft 200 mg daily, Vraylar 1 5 mg daily, trazodone 100 mg nightly (prescribed 50 mg nightly), and as needed Atarax  The patient believes medication was working well to control symptoms of depression and anxiety    The patient is " "agreeable to continue hospitalization for now but states he hopes for discharge soon  Hospital Course: The patient was admitted to the inpatient psychiatric unit and started on every 7 minutes precautions  During the hospitalization the patient was attending individual therapy, group therapy, milieu therapy and occupational therapy  Psychiatric medications were titrated over the hospital stay to address depression, depressive symptoms, irritability, impulsivity, anxiety symptoms and panic attacks, anger, and suicidal ideation  Flores manley was continued on antidepressant Zoloft, antipsychotic medication Vraylar and hypnotic medication Trazodone  Medication doses were titrated during the hospital course  Prior to beginning of treatment medications risks and benefits and possible side effects including risk of parkinsonian symptoms, Tardive Dyskinesia and metabolic syndrome related to treatment with antipsychotic medications, risk of cardiovascular events in elderly related to treatment with antipsychotic medications, risk of suicidality and serotonin syndrome related to treatment with antidepressants and risk of impaired next-day mental alertness, complex sleep-related behavior and dependence related to treatment with hypnotic medications were reviewed with the patient  Flores manley verbalized understanding and agreement for treatment  Corey's symptoms improved gradually over the hospital course  During his inpatient stay, he was visible and participatory in milieu activities, social with peers, and engaged in treatment  He was remorseful of events preceding admission and stated \"I just said something I shouldn't have  \"  While on the unit, [place] exhibited no suicidal threats or gestures and maintained safety  He continued to endorse intermittent vague noncommanding auditory hallucinations that sounded \"from afar\" and \"like someone calling my name  \"  Eloina Robeson was further titrated to address AH, depression, and mood " "stabilization with positive therapeutic effect  Throughout hospitalization, Rand Pineda verbalized significant improvement with anxiety and depression; exhibited no panic attacks  Rated anxiety and depression as 1/10 nearing discharge  Vague AH persisted, however described as \"less prevalent, less intense, and barely there  \"  Thoughts were organized, goal directed, and forward thinking with no delusional content verbalized  Also exhibited improvement with insight and judgment  Since patient maintaining improvement and safety on unit, decision was made to begin discharge preparations  Case management also spoke with patient's mother who agreed patient sounded \"so much better  \"    At the end of treatment, Rand Pineda was doing well  Mood was stable at the time of discharge with no signs or symptoms of acute psychosis, aron, or hypomania  He adamantly denied suicidal and homicidal ideation, intent or plan at the time of discharge  Sleep, appetite, and self-care were improved  Also exhibited improved insight and judgment  Prior to discharge, Rand Pineda verbalized an adequate safety plan to utilize in time of crisis should he feel he become a danger to himself or others  This plan includes talking with his friends or family, utilizing crisis hotline, or returning to the nearest emergency department  Rand Pineda also identified his family and friends as strong protective factors against suicide  Thoughts were goal directed and forward thinking with plan to maintain medication compliance and outpatient psychiatric follow-up as well as returning to work  Rand Pineda was stabilized on the following psychotropic regimen: Zoloft 200 mg PO QD, Vraylar 3mg PO QD, and Trazodone 100mg PO QHS  He was tolerating medications and was not reporting any significant side effects at the time of discharge      We felt that Rand Pineda achieved the maximum benefit of inpatient stay at that point, was at baseline at the end of the hospitalization and " "could now follow up with outpatient treatment  Prior to discharge  spoke with Corey's mother Mitali Gaytan) to address support and Chuy Small readiness for discharge; stated patient sounded improved over the phone  Diana Riojas also confirmed that all guns were securely locked at home  Chuy Small also felt stable and ready for discharge at the end of the hospital stay  The outpatient follow up 6/20/23 @ 2:30 with [facility] [address] for psychiatric medication management was arranged by the unit  upon discharge  Chuy Small was also placed on a wait-list for therapy through [facility] [address]       Mental Status at time of Discharge:     Appearance:  age appropriate, casually dressed and Well-groomed   Behavior:  Cooperative, calm, good eye contact, social   Speech:  normal pitch and normal volume   Mood:  \"Really good\"   Affect:  mood-congruent   Thought Process:  goal directed and Linear, forward thinking   Thought Content:  No overt delusions or paranoia   Perceptual Disturbances: Denied AVH, did not appear internally preoccupied   Risk Potential: Suicidal Ideations none, Homicidal Ideations none and Potential for Aggression No   Sensorium:  person, place, time/date and situation   Cognition:  recent and remote memory grossly intact   Consciousness:  alert and awake    Attention: attention span and concentration were age appropriate   Insight:  Improved, fair   Judgment: Improved, fair   Gait/Station: normal gait/station and normal balance   Motor Activity: no abnormal movements     Admission Diagnosis:Mood disorder ([facility] ) [F39]    Discharge Diagnosis:   Principal Problem (Resolved):    MDD (major depressive disorder), recurrent episode, severe ([facility] )  Active Problems:    Generalized anxiety disorder    Panic disorder    PTSD (post-traumatic stress disorder)  Resolved Problems:    Insomnia    Lab results:  Admission on 06/09/2023   Component Date Value   • Sodium 06/10/2023 135    • " Potassium 06/10/2023 3 7    • Chloride 06/10/2023 100    • CO2 06/10/2023 25    • ANION GAP 06/10/2023 10    • BUN 06/10/2023 17    • Creatinine 06/10/2023 0 99    • Glucose 06/10/2023 126    • Calcium 06/10/2023 10 2    • AST 06/10/2023 33    • ALT 06/10/2023 51    • Alkaline Phosphatase 06/10/2023 88    • Total Protein 06/10/2023 8 1    • Albumin 06/10/2023 4 5    • Total Bilirubin 06/10/2023 0 73    • eGFR 06/10/2023 106    • WBC 06/10/2023 6 97    • RBC 06/10/2023 5 27    • Hemoglobin 06/10/2023 16 4    • Hematocrit 06/10/2023 47 3    • MCV 06/10/2023 90    • MCH 06/10/2023 31 1    • MCHC 06/10/2023 34 7    • RDW 06/10/2023 12 1    • MPV 06/10/2023 10 6    • Platelets 68/13/3769 300    • nRBC 06/10/2023 0    • Neutrophils Relative 06/10/2023 60    • Immat GRANS % 06/10/2023 0    • Lymphocytes Relative 06/10/2023 27    • Monocytes Relative 06/10/2023 7    • Eosinophils Relative 06/10/2023 5    • Basophils Relative 06/10/2023 1    • Neutrophils Absolute 06/10/2023 4 17    • Immature Grans Absolute 06/10/2023 0 02    • Lymphocytes Absolute 06/10/2023 1 91    • Monocytes Absolute 06/10/2023 0 50    • Eosinophils Absolute 06/10/2023 0 33    • Basophils Absolute 06/10/2023 0 04    • TSH 3RD GENERATON 06/10/2023 1 280    • Cholesterol 06/10/2023 218 (H)    • Triglycerides 06/10/2023 161 (H)    • HDL, Direct 06/10/2023 44    • LDL Calculated 06/10/2023 142 (H)    • Non-HDL-Chol (CHOL-HDL) 06/10/2023 174    • Folate 06/10/2023 10 7    • Vitamin B-12 06/10/2023 228    • Vit D, 25-Hydroxy 06/10/2023 22 2 (L)    • Ventricular Rate 06/10/2023 95    • Atrial Rate 06/10/2023 95    • TX Interval 06/10/2023 172    • QRSD Interval 06/10/2023 100    • QT Interval 06/10/2023 350    • QTC Interval 06/10/2023 439    • P Axis 06/10/2023 68    • QRS Axis 06/10/2023 93    • T Wave Axis 06/10/2023 32      Discharge Medications:  Current Discharge Medication List      START taking these medications    Details   cholecalciferol (VITAMIN D3) 1,000 units tablet Take 1 tablet (1,000 Units total) by mouth daily Do not start before Ashley 15, 2023  Qty: 30 tablet, Refills: 0    Associated Diagnoses: Vitamin D deficiency      cyanocobalamin (VITAMIN B-12) 1000 MCG tablet Take 1 tablet (1,000 mcg total) by mouth daily Do not start before Ashley 15, 2023  Qty: 30 tablet, Refills: 0    Associated Diagnoses: Vitamin B12 deficiency      nicotine polacrilex (NICORETTE) 2 mg gum Chew 1 each (2 mg total) every 2 (two) hours as needed for smoking cessation  Qty: 100 each, Refills: 0    Associated Diagnoses: Tobacco abuse      sodium chloride (OCEAN) 0 65 % nasal spray 1 spray into each nostril every hour as needed for congestion  Qty: 104 mL, Refills: 0    Associated Diagnoses: Sinus congestion            Current Discharge Medication List      STOP taking these medications       hydrOXYzine pamoate (VISTARIL) 50 mg capsule Comments:   Reason for Stopping:         amoxicillin (AMOXIL) 500 mg capsule Comments:   Reason for Stopping:         sucralfate (CARAFATE) 1 g/10 mL suspension Comments:   Reason for Stopping:              Current Discharge Medication List      CONTINUE these medications which have CHANGED    Details   cariprazine (VRAYLAR) 3 MG capsule Take 1 capsule (3 mg total) by mouth daily Do not start before June 14, 2023    Qty: 30 capsule, Refills: 1    Associated Diagnoses: MDD (major depressive disorder), recurrent episode, severe (HCC)      famotidine (PEPCID) 20 mg tablet Take 2 tablets (40 mg total) by mouth daily at bedtime  Qty: 30 tablet, Refills: 0    Associated Diagnoses: Gastroesophageal reflux disease, unspecified whether esophagitis present      meclizine (ANTIVERT) 25 mg tablet Take 1 tablet (25 mg total) by mouth every 8 (eight) hours as needed for dizziness or nausea  Qty: 30 tablet, Refills: 0    Associated Diagnoses: Benign paroxysmal positional vertigo due to bilateral vestibular disorder      omeprazole (PriLOSEC) 40 MG capsule Take 1 capsule (40 mg total) by mouth daily  Qty: 90 capsule, Refills: 0    Associated Diagnoses: Gastroesophageal reflux disease, unspecified whether esophagitis present      ondansetron (ZOFRAN) 4 mg tablet Take 1 tablet (4 mg total) by mouth every 8 (eight) hours as needed for nausea or vomiting  Qty: 20 tablet, Refills: 0    Associated Diagnoses: Hematemesis with nausea      sertraline (ZOLOFT) 100 mg tablet Take 2 tablets (200 mg total) by mouth daily  Qty: 60 tablet, Refills: 1    Associated Diagnoses: Generalized anxiety disorder      traZODone (DESYREL) 100 mg tablet Take 1 tablet (100 mg total) by mouth daily at bedtime  Qty: 30 tablet, Refills: 1    Associated Diagnoses: MDD (major depressive disorder), [facility] episode, [facility] ([facility] ); Insomnia            Current Discharge Medication List         Discharge instructions/Information to patient and family:   See after visit summary for information provided to patient and family  Provisions for Follow-Up Care:  See after visit summary for information related to follow-up care and any pertinent home health orders  Discharge Statement:    I spent 25 minutes discharging the patient  This time was spent on the day of discharge  I had direct contact with the patient on the day of discharge  I reviewed with Clayton Guillaume importance of compliance with medications and outpatient treatment after discharge  I discussed the medication regimen and possible side effects of the medications with Clayton Guillaume prior to discharge  At the time of discharge he was tolerating psychiatric medications  I discussed outpatient follow up with Clayton Guillaume  I reviewed with Clayton Guillaume crisis plan and safety plan upon discharge  I discussed with Clayton Guillaume recommendation to follow up with outpatient drug and alcohol counseling and AA meetings  Clayton Guillaume agreed to abstain from drug and alcohol use after discharge    Clayton Guillaume has been filing controlled prescriptions on time as prescribed according to [facility] Evie Nissen 06/14/23

## 2023-06-14 NOTE — PLAN OF CARE
Problem: Alteration in Thoughts and Perception  Goal: Treatment Goal: Gain control of psychotic behaviors/thinking, reduce/eliminate presenting symptoms and demonstrate improved reality functioning upon discharge  Outcome: Progressing  Goal: Verbalize thoughts and feelings  Description: Interventions:  - Promote a nonjudgmental and trusting relationship with the patient through active listening and therapeutic communication  - Assess patient's level of functioning, behavior and potential for risk  - Engage patient in 1 on 1 interactions  - Encourage patient to express fears, feelings, frustrations, and discuss symptoms    - Harmony patient to reality, help patient recognize reality-based thinking   - Administer medications as ordered and assess for potential side effects  - Provide the patient education related to the signs and symptoms of the illness and desired effects of prescribed medications  Outcome: Progressing     Problem: Risk for Self Injury/Neglect  Goal: Refrain from harming self  Description: Interventions:  - Monitor patient closely, per order  - Develop a trusting relationship  - Supervise medication ingestion, monitor effects and side effects   Outcome: Adequate for Discharge     Problem: Depression  Goal: Refrain from isolation  Description: Interventions:  - Develop a trusting relationship   - Encourage socialization   Outcome: Adequate for Discharge

## 2023-06-14 NOTE — PROGRESS NOTES
"Progress Note - Sowmya Mirza 21 y o  male MRN: [id number]    Unit/Bed#: [facility] 245-01 Encounter: [id number]        Subjective:   Patient seen and examined at bedside after reviewing the chart and discussing the case with the caring staff  Patient has no acute symptoms  Patient is being discharged today  Patient is requesting all his prescriptions  I reviewed and reconciled patient's problem list and medications  Physical Exam   Vitals: Blood pressure 116/77, pulse 80, temperature 98 °F (36 7 °C), temperature source Temporal, resp  rate 18, height 5' 11\" (1 803 m), weight 110 kg (243 lb), SpO2 94 %  ,Body mass index is 33 89 kg/m²  Constitutional: Patient in no acute distress  HEENT: PERR, EOMI, MMM  Cardiovascular: Normal rate and regular rhythm  Pulmonary/Chest: Effort normal and breath sounds normal    Abdomen: Non distended  Assessment/Plan:  Sowmya Mirza is a(n) 21 y o  male with MDD  Medical clearance  Patient is medically cleared for discharge  All scripts will be sent out for the patient      1  GERD  Continue Protonix 40 mg daily, Pepcid 40 mg daily at bedtime  2  Vertigo  Stable  Meclizine as needed  3  Insomnia  Patient is on trazodone at bedtime  4  Vitamin D deficiency  Patient started on vitamin D 1000 units daily  5  Vitamin B12 deficiency  Patient started on vitamin B12 supplement  6  URI  Patient is finishing up amoxicillin 500 mg q8h x 10 days which was prescribed outpatient on 6/1/2023  Saline nasal spray as needed    "

## 2023-06-20 ENCOUNTER — OFFICE VISIT (OUTPATIENT)
Dept: PSYCHIATRY | Facility: CLINIC | Age: 23
End: 2023-06-20
Payer: COMMERCIAL

## 2023-06-20 DIAGNOSIS — F43.10 PTSD (POST-TRAUMATIC STRESS DISORDER): ICD-10-CM

## 2023-06-20 DIAGNOSIS — F33.1 MODERATE EPISODE OF RECURRENT MAJOR DEPRESSIVE DISORDER (HCC): Primary | ICD-10-CM

## 2023-06-20 DIAGNOSIS — F41.1 GENERALIZED ANXIETY DISORDER: ICD-10-CM

## 2023-06-20 PROCEDURE — 99213 OFFICE O/P EST LOW 20 MIN: CPT

## 2023-06-20 NOTE — PSYCH
Regular Visit    Problem List Items Addressed This Visit        Other    Generalized anxiety disorder   Other Visit Diagnoses     Moderate episode of recurrent major depressive disorder ([facility] [address] )    -  Primary    Insomnia, unspecified type                 Encounter provider HOLGER Skaggs    Provider located at    [id number] [facility]  [address] [address] [phone]    Recent Visits  No visits were found meeting these conditions  Showing recent visits within past 7 days and meeting all other requirements  Today's Visits  Date Type Provider Dept   06/20/23 Office Visit HOLGER Skaggs [facility]   Showing today's visits and meeting all other requirements  Future Appointments  No visits were found meeting these conditions    Showing future appointments within next 150 days and meeting all other requirements       HPI     Current Outpatient Medications   Medication Sig Dispense Refill   • cariprazine (VRAYLAR) 3 MG capsule Take 1 capsule (3 mg total) by mouth daily Do not start before June 14, 2023  30 capsule 1   • ondansetron (ZOFRAN) 4 mg tablet Take 1 tablet (4 mg total) by mouth every 8 (eight) hours as needed for nausea or vomiting 20 tablet 0   • sertraline (ZOLOFT) 100 mg tablet Take 2 tablets (200 mg total) by mouth daily 60 tablet 1   • sodium chloride (OCEAN) 0 65 % nasal spray 1 spray into each nostril every hour as needed for congestion 104 mL 0   • traZODone (DESYREL) 100 mg tablet Take 1 tablet (100 mg total) by mouth daily at bedtime 30 tablet 1   • cholecalciferol (VITAMIN D3) 1,000 units tablet Take 1 tablet (1,000 Units total) by mouth daily Do not start before Ashley 15, 2023  30 tablet 0   • cyanocobalamin (VITAMIN B-12) 1000 MCG tablet Take 1 tablet (1,000 mcg total) by mouth daily Do not start before Ashley 15, 2023  30 tablet 0   • famotidine (PEPCID) 20 mg tablet Take 2 tablets (40 mg total) by mouth daily at bedtime 30 tablet 0   • meclizine (ANTIVERT) 25 mg tablet Take 1 tablet (25 mg total) by mouth every 8 (eight) hours as needed for dizziness or nausea 30 tablet 0   • nicotine polacrilex (NICORETTE) 2 mg gum Chew 1 each (2 mg total) every 2 (two) hours as needed for smoking cessation 100 each 0   • omeprazole (PriLOSEC) 40 MG capsule Take 1 capsule (40 mg total) by mouth daily 90 capsule 0     No current facility-administered medications for this visit  Review of Systems        MEDICATION MANAGEMENT NOTE        [address] [place] [address]    Name and Date of Birth:  Anuj Javier 21 y o  2000 MRN: [id number]    Date of Visit: June 20, 2023    No Known Allergies  SUBJECTIVE:    Chloé Rios is seen today for a follow up for Major Depressive Disorder, Generalized Anxiety Disorder and insomnia  He continues to improve slowly since the last visit  Patient recently discharged from psychiatric inpatient hospitalization on 6/14/2023 for increased depression, argument with friends, suicidal ideation with plan to shoot himself  Patient denies ever being suicidal with a plan but states he knew he had to say that in order to be admitted  He wanted to be admitted due to recent high stress levels with his friends and girlfriend  He appears to get easily overwhelmed and has difficulty regulating his emotions which leads to overreactions and anger  Will continue to highly encourage psychotherapy for emotional regulation, coping skills  Provider spoke to therapist in-office to have him added to the wait list and pt agreed to plan  Eloina Lionel was titrated to 3 mg daily during inpatient stay, remaining medications remain the same  He is tolerating the increase in dosage of Vraylar with no side effects and we will continue   Vaguely reported auditory hallucinations of hearing his mother's voice leading up to inpatient stay, patient states his house is haunted and this is not abnormal for him  He denies command hallucinations, visual hallucinations, recent auditory hallucinations  Denies feeling paranoid  No evidence of aron or psychosis at this time  Mood is euthymic and appropriate, he is looking forward to returning to work and a note was provided during today's office visit  Continue to use recently learned coping skills such as journaling on a daily basis going forward  He denies suicidal and homicidal ideation  Patient agreeable to go to the ER immediately if he ever develops suicidal ideation with plan  Patient has the crisis hotline and suicide hotline  He denies any side effects from medications      PLAN:    -Continue Zoloft to 200 mg daily for depressive and anxiety symptoms PARQ completed including serotonin syndrome, SIADH, worsening depression, suicidality, induction of aron, GI upset, headaches, activation, sexual side effects, sedation, potential drug interactions, and others      -Continue Vraylar 3mg for treatment resistant depression and augmentation of zoloft     -Continue prn Atarax 50mg daily for breakthrough anxiety     -Continue trazodone 100 mg at bedtime for insomnia          Aware of 24 hour and weekend coverage for urgent situations accessed by calling [facility] main practice number  Referral for individual psychotherapy    Diagnoses and all orders for this visit:    Moderate episode of recurrent major depressive disorder ([facility] [address] )    Generalized anxiety disorder    Insomnia, unspecified type        Current Outpatient Medications on File Prior to Visit   Medication Sig Dispense Refill   • cariprazine (VRAYLAR) 3 MG capsule Take 1 capsule (3 mg total) by mouth daily Do not start before June 14, 2023  30 capsule 1   • ondansetron (ZOFRAN) 4 mg tablet Take 1 tablet (4 mg total) by mouth every 8 (eight) hours as needed for nausea or vomiting 20 tablet 0   • sertraline (ZOLOFT) 100 mg tablet Take 2 tablets (200 mg total) by mouth daily 60 tablet 1   • sodium chloride (OCEAN) 0 65 % nasal spray 1 spray into each nostril every hour as needed for congestion 104 mL 0   • traZODone (DESYREL) 100 mg tablet Take 1 tablet (100 mg total) by mouth daily at bedtime 30 tablet 1   • cholecalciferol (VITAMIN D3) 1,000 units tablet Take 1 tablet (1,000 Units total) by mouth daily Do not start before Ashley 15, 2023  30 tablet 0   • cyanocobalamin (VITAMIN B-12) 1000 MCG tablet Take 1 tablet (1,000 mcg total) by mouth daily Do not start before Ashley 15, 2023  30 tablet 0   • famotidine (PEPCID) 20 mg tablet Take 2 tablets (40 mg total) by mouth daily at bedtime 30 tablet 0   • meclizine (ANTIVERT) 25 mg tablet Take 1 tablet (25 mg total) by mouth every 8 (eight) hours as needed for dizziness or nausea 30 tablet 0   • nicotine polacrilex (NICORETTE) 2 mg gum Chew 1 each (2 mg total) every 2 (two) hours as needed for smoking cessation 100 each 0   • omeprazole (PriLOSEC) 40 MG capsule Take 1 capsule (40 mg total) by mouth daily 90 capsule 0     No current facility-administered medications on file prior to visit  HPI ROS Appetite Changes and Sleep:     He reports normal sleep, adequate appetite, fluctuating energy levels   Denies homicidal ideation, denies suicidal ideation    Review Of Systems:      HPI ROS:               Medication Side Effects:  denies    Depression (10 worst): 4/10    Anxiety (10 worst): 2-5/10    Safety concerns (SI, HI, etc): Denies si and hi    Sleep: 7 hrs    Energy: adequate    Appetite: good    Weight Change: denies        Mental Status Evaluation:    Appearance Adequate hygiene and grooming   Behavior calm and cooperative   Mood anxious and depressed  Depression Scale - 4 of 10 (0 = No depression)  Anxiety Scale - 5 of 10 (0 = No anxiety)   Speech Normal rate and volume   Affect constricted   Thought Processes Goal directed and coherent   Thought Content Does not verbalize delusional material   Associations Tightly connected Perceptual Disturbances Denies hallucinations and does not appear to be responding to internal stimuli   Risk Potential Suicidal/Homicidal Ideation - No evidence of suicidal or homicidal ideation and patient does not verbalize suicidal or homicidal ideation  Risk of Violence - No evidence of risk for violence found on assessment  Risk of Self Mutilation - No evidence of risk for self mutilation found on assessment   Orientation oriented to person, place, time/date and situation   Memory recent and remote memory grossly intact   Consciousness alert and awake   Attention/Concentration attention span and concentration are age appropriate   Insight intact   Judgement intact   Muscle Strength and Gait normal muscle strength and normal muscle tone, normal gait/station and normal balance   Motor Activity no abnormal movements   Language no difficulty naming common objects, no difficulty repeating a phrase, no difficulty writing a sentence   Fund of Knowledge adequate knowledge of current events  adequate fund of knowledge regarding past history  adequate fund of knowledge regarding vocabulary      Traumatic History Update:     New Onset of Abuse Since Last Encounter:   no  Traumatic Events Since Last Encounter:   no    Past Medical History:    Past Medical History:   Diagnosis Date   • Anxiety    • Depression    • Schizoaffective disorder ([facility] [address] )         Past Surgical History:   Procedure Laterality Date   • COLONOSCOPY       No Known Allergies  Substance Abuse History:    Social History     Substance and Sexual Activity   Alcohol Use Not Currently   • Alcohol/week: 2 0 standard drinks of alcohol   • Types: 2 Shots of liquor per week     Social History     Substance and Sexual Activity   Drug Use Not Currently     Social History:    Social History     Socioeconomic History   • Marital status: Single     Spouse name: Not on file   • Number of children: Not on file   • Years of education: Not on file   • Highest education level: Not on file   Occupational History   • Not on file   Tobacco Use   • Smoking status: Former     Years: 3 00     Types: Cigarettes   • Smokeless tobacco: Never   • Tobacco comments:     once monthy   Vaping Use   • Vaping Use: Former   • Substances: CBD   Substance and Sexual Activity   • Alcohol use: Not Currently     Alcohol/week: 2 0 standard drinks of alcohol     Types: 2 Shots of liquor per week   • Drug use: Not Currently   • Sexual activity: Yes     Partners: Female   Other Topics Concern   • Not on file   Social History Narrative   • Not on file     Social Determinants of Health     Financial Resource Strain: Not on file   Food Insecurity: Not on file   Transportation Needs: Not on file   Physical Activity: Not on file   Stress: Not on file   Social Connections: Not on file   Intimate Partner Violence: Not on file   Housing Stability: Not on file     Family Psychiatric History:     Family History   Problem Relation Age of Onset   • Depression Mother    • No Known Problems Father      History Review: The following portions of the patient's history were reviewed and updated as appropriate: allergies, current medications, past family history, past medical history, past social history, past surgical history and problem list     OBJECTIVE:     Vital signs in last 24 hours: There were no vitals filed for this visit  Laboratory Results:   Recent Labs (last 2 months):    Admission on 06/09/2023, Discharged on 06/14/2023   Component Date Value   • Sodium 06/10/2023 135    • Potassium 06/10/2023 3 7    • Chloride 06/10/2023 100    • CO2 06/10/2023 25    • ANION GAP 06/10/2023 10    • BUN 06/10/2023 17    • Creatinine 06/10/2023 0 99    • Glucose 06/10/2023 126    • Calcium 06/10/2023 10 2    • AST 06/10/2023 33    • ALT 06/10/2023 51    • Alkaline Phosphatase 06/10/2023 88    • Total Protein 06/10/2023 8 1    • Albumin 06/10/2023 4 5    • Total Bilirubin 06/10/2023 0 73    • eGFR 06/10/2023 106    • WBC 06/10/2023 6 97    • RBC 06/10/2023 5 27    • Hemoglobin 06/10/2023 16 4    • Hematocrit 06/10/2023 47 3    • MCV 06/10/2023 90    • MCH 06/10/2023 31 1    • MCHC 06/10/2023 34 7    • RDW 06/10/2023 12 1    • MPV 06/10/2023 10 6    • Platelets 07/65/4446 300    • nRBC 06/10/2023 0    • Neutrophils Relative 06/10/2023 60    • Immat GRANS % 06/10/2023 0    • Lymphocytes Relative 06/10/2023 27    • Monocytes Relative 06/10/2023 7    • Eosinophils Relative 06/10/2023 5    • Basophils Relative 06/10/2023 1    • Neutrophils Absolute 06/10/2023 4 17    • Immature Grans Absolute 06/10/2023 0 02    • Lymphocytes Absolute 06/10/2023 1 91    • Monocytes Absolute 06/10/2023 0 50    • Eosinophils Absolute 06/10/2023 0 33    • Basophils Absolute 06/10/2023 0 04    • TSH 3RD GENERATON 06/10/2023 1 280    • Cholesterol 06/10/2023 218 (H)    • Triglycerides 06/10/2023 161 (H)    • HDL, Direct 06/10/2023 44    • LDL Calculated 06/10/2023 142 (H)    • Non-HDL-Chol (CHOL-HDL) 06/10/2023 174    • Folate 06/10/2023 10 7    • Vitamin B-12 06/10/2023 228    • Vit D, 25-Hydroxy 06/10/2023 22 2 (L)    • Ventricular Rate 06/10/2023 95    • Atrial Rate 06/10/2023 95    • VA Interval 06/10/2023 172    • QRSD Interval 06/10/2023 100    • QT Interval 06/10/2023 350    • QTC Interval 06/10/2023 439    • P Axis 06/10/2023 68    • QRS Axis 06/10/2023 93    • T Wave [place] 06/10/2023 32    Admission on 06/08/2023, Discharged on 06/09/2023   Component Date Value   • Amph/Meth UR 06/08/2023 Negative    • Barbiturate Ur 06/08/2023 Negative    • Benzodiazepine Urine 06/08/2023 Negative    • Cocaine Urine 06/08/2023 Negative    • Methadone Urine 06/08/2023 Negative    • Opiate Urine 06/08/2023 Negative    • PCP Ur 06/08/2023 Negative    • THC Urine 06/08/2023 Negative    • Oxycodone Urine 06/08/2023 Negative    • EXTBreath Alcohol 06/08/2023 0 000    Hospital Outpatient Visit on 05/31/2023   Component Date Value   • Case Report 05/31/2023 Value:Surgical Pathology Report                         Case: [id number]                                   Authorizing Provider:  Korin Bowling MD       Collected:           05/31/2023 1138              Ordering Location:     [facility] Received:            05/31/2023 1339                                     Operating Room                                                               Pathologist:           Sangita Carrillo MD                                                    Specimens:   A) - Duodenum, cold fcp bx r/o celiacs                                                              B) - Stomach, cold fcp bx r/o h pylori                                                              C) - Colon, cold fcp bx r/o microscopic colitis-random                                    • Final Diagnosis 05/31/2023                      Value: This result contains rich text formatting which cannot be displayed here  • Additional Information 05/31/2023                      Value: This result contains rich text formatting which cannot be displayed here  • Gross Description 05/31/2023                      Value: This result contains rich text formatting which cannot be displayed here     Admission on 05/02/2023, Discharged on 05/02/2023   Component Date Value   • WBC 05/02/2023 5 88    • RBC 05/02/2023 5 12    • Hemoglobin 05/02/2023 16 0    • Hematocrit 05/02/2023 45 0    • MCV 05/02/2023 88    • MCH 05/02/2023 31 3    • MCHC 05/02/2023 35 6    • RDW 05/02/2023 12 1    • MPV 05/02/2023 10 4    • Platelets 96/42/2062 272    • nRBC 05/02/2023 0    • Neutrophils Relative 05/02/2023 50    • Immat GRANS % 05/02/2023 0    • Lymphocytes Relative 05/02/2023 37    • Monocytes Relative 05/02/2023 10    • Eosinophils Relative 05/02/2023 2    • Basophils Relative 05/02/2023 1    • Neutrophils Absolute 05/02/2023 2 96    • Immature Grans Absolute 05/02/2023 0 01    • Lymphocytes Absolute 05/02/2023 2 17    • Monocytes Absolute 05/02/2023 0 58    • Eosinophils Absolute 05/02/2023 0 10    • Basophils Absolute 05/02/2023 0 06    • Sodium 05/02/2023 137    • Potassium 05/02/2023 4 0    • Chloride 05/02/2023 100    • CO2 05/02/2023 27    • ANION GAP 05/02/2023 10    • BUN 05/02/2023 17    • Creatinine 05/02/2023 0 94    • Glucose 05/02/2023 91    • Calcium 05/02/2023 9 7    • AST 05/02/2023 28    • ALT 05/02/2023 46    • Alkaline Phosphatase 05/02/2023 84    • Total Protein 05/02/2023 7 2    • Albumin 05/02/2023 4 4    • Total Bilirubin 05/02/2023 0 48    • eGFR 05/02/2023 114    • Protime 05/02/2023 13 0    • INR 05/02/2023 0 97    • PTT 05/02/2023 29    • Lipase 05/02/2023 25      I have personally reviewed all pertinent laboratory/tests results  Suicide/Homicide Risk Assessment:    Risk of Harm to Self:  Protective Factors: no current suicidal ideation, access to mental health treatment, compliant with medications, compliant with mental health treatment, connection to community, having a desire to be alive, having a sense of purpose or meaning in life, medical compliance, resiliency, stable job, strong relationships  Based on today's assessment, Clayton Guillaume presents the following risk of harm to self: minimal    Risk of Harm to Others:  Based on today's assessment, Claytonban Guillaume presents the following risk of harm to others: minimal    The following interventions are recommended: referral for psychotherapy    Medications Risks/Benefits:      Risks, Benefits And Possible Side Effects Of Medications:    Discussed risks and benefits of treatment with patient including risk of suicidality, serotonin syndrome, increased QTc interval and SIADH related to treatment with antidepressants;  Risk of induction of manic symptoms in certain patient populations and risk of parkinsonian symptoms, metabolic syndrome, tardive dyskinesia and neuroleptic malignant syndrome related to treatment with antipsychotic medications     Controlled Medication Discussion: Not applicable    Treatment Plan:    Due for update/Updated:   no  Last treatment plan done 4/5/23   Treatment Plan due on 11/5/23      HOLGER Hogan 06/20/23    Visit Time    Visit Start Time: 216  Visit Stop Time: 255  Total Visit Duration: 20 minutes

## 2023-06-21 ENCOUNTER — OFFICE VISIT (OUTPATIENT)
Dept: FAMILY MEDICINE CLINIC | Facility: CLINIC | Age: 23
End: 2023-06-21
Payer: COMMERCIAL

## 2023-06-21 VITALS
WEIGHT: 252.4 LBS | HEIGHT: 71 IN | BODY MASS INDEX: 35.34 KG/M2 | SYSTOLIC BLOOD PRESSURE: 118 MMHG | DIASTOLIC BLOOD PRESSURE: 66 MMHG | OXYGEN SATURATION: 99 % | HEART RATE: 67 BPM

## 2023-06-21 DIAGNOSIS — F43.10 PTSD (POST-TRAUMATIC STRESS DISORDER): ICD-10-CM

## 2023-06-21 DIAGNOSIS — F41.1 GENERALIZED ANXIETY DISORDER: ICD-10-CM

## 2023-06-21 DIAGNOSIS — F32.9 CURRENT EPISODE OF MAJOR DEPRESSIVE DISORDER WITHOUT PRIOR EPISODE, UNSPECIFIED DEPRESSION EPISODE SEVERITY: ICD-10-CM

## 2023-06-21 DIAGNOSIS — Z09 HOSPITAL DISCHARGE FOLLOW-UP: Primary | ICD-10-CM

## 2023-06-21 PROBLEM — Z91.199 NO-SHOW FOR APPOINTMENT: Status: RESOLVED | Noted: 2022-10-10 | Resolved: 2023-06-21

## 2023-06-21 PROBLEM — J06.9 UPPER RESPIRATORY TRACT INFECTION: Status: RESOLVED | Noted: 2023-06-01 | Resolved: 2023-06-21

## 2023-06-21 PROCEDURE — 99495 TRANSJ CARE MGMT MOD F2F 14D: CPT | Performed by: PHYSICIAN ASSISTANT

## 2023-06-21 NOTE — ASSESSMENT & PLAN NOTE
Recommended that patient continue to follow-up with psychiatry as scheduled  No homicidal or suicidal thoughts

## 2023-06-21 NOTE — PROGRESS NOTES
Name: Kalina Lemos      : 2000      MRN: [id number]  Encounter Provider: Zully Acevedo PA-C  Encounter Date: 2023   Encounter department: [address] [place] [address] Road     1  Hospital discharge follow-up  Assessment & Plan:  Recommended that patient continue to follow-up with psychiatry as scheduled  No homicidal or suicidal thoughts      2  PTSD (post-traumatic stress disorder)  Assessment & Plan:  Recommended that patient continue to follow with psychiatry  Encourage patient to establish with a counselor      3  Generalized anxiety disorder  Assessment & Plan:  Recommended that patient continue to follow with psychiatry  Encouraged him to establish with a counselor      4  Current episode of major depressive disorder without prior episode, unspecified depression episode severity  Assessment & Plan:  Recommended that patient continue to follow with psychiatry  Encourage patient to establish with a counselor             Deja Estrada is a pleasant 49-year-old male who is here today for follow-up from his recent hospitalization  He was hospitalized from 2023-2023 for suicidal thoughts  During his hospitalization his Vraylar was titrated  He also participated in counseling and group therapy  He admits that his symptoms did improve  He had his follow-up outpatient with psychiatry yesterday  His Vraylar was increased to 3 mg daily  He admits that he is doing much better  He denies any suicidal or homicidal thoughts  He is in the process of getting in with a counselor  He admits that he has good support from his family  He lives with his parents  He denies any other concerns at this time  Review of Systems   Constitutional: Negative for chills, diaphoresis, fatigue and fever  HENT: Negative for congestion, ear pain, postnasal drip, rhinorrhea, sneezing, sore throat and trouble swallowing  Eyes: Negative for pain and visual disturbance     Respiratory: "Negative for apnea, cough, shortness of breath and wheezing  Cardiovascular: Negative for chest pain and palpitations  Gastrointestinal: Negative for abdominal pain, constipation, diarrhea, nausea and vomiting  Genitourinary: Negative for dysuria  Musculoskeletal: Negative for arthralgias, gait problem and myalgias  Neurological: Negative for dizziness, syncope, weakness, light-headedness, numbness and headaches  Psychiatric/Behavioral: Positive for dysphoric mood and sleep disturbance  Negative for suicidal ideas  The patient is nervous/anxious  Current Outpatient Medications on File Prior to Visit   Medication Sig   • cariprazine (VRAYLAR) 3 MG capsule Take 1 capsule (3 mg total) by mouth daily Do not start before June 14, 2023  • cholecalciferol (VITAMIN D3) 1,000 units tablet Take 1 tablet (1,000 Units total) by mouth daily Do not start before Ashley 15, 2023  • cyanocobalamin (VITAMIN B-12) 1000 MCG tablet Take 1 tablet (1,000 mcg total) by mouth daily Do not start before Ashley 15, 2023  • famotidine (PEPCID) 20 mg tablet Take 2 tablets (40 mg total) by mouth daily at bedtime   • meclizine (ANTIVERT) 25 mg tablet Take 1 tablet (25 mg total) by mouth every 8 (eight) hours as needed for dizziness or nausea   • omeprazole (PriLOSEC) 40 MG capsule Take 1 capsule (40 mg total) by mouth daily   • ondansetron (ZOFRAN) 4 mg tablet Take 1 tablet (4 mg total) by mouth every 8 (eight) hours as needed for nausea or vomiting   • sertraline (ZOLOFT) 100 mg tablet Take 2 tablets (200 mg total) by mouth daily   • sodium chloride (OCEAN) 0 65 % nasal spray 1 spray into each nostril every hour as needed for congestion   • traZODone (DESYREL) 100 mg tablet Take 1 tablet (100 mg total) by mouth daily at bedtime       Objective     /66   Pulse 67   Ht 5' 11\" (1 803 m)   Wt 114 kg (252 lb 6 4 oz)   SpO2 99%   BMI 35 20 kg/m²     Physical Exam  Vitals and nursing note reviewed     Constitutional:      " Appearance: He is well-developed  HENT:      Head: Normocephalic and atraumatic  Right Ear: External ear normal       Left Ear: External ear normal       Nose: Nose normal    Eyes:      Extraocular Movements: Extraocular movements intact  Cardiovascular:      Rate and Rhythm: Normal rate and regular rhythm  Heart sounds: Normal heart sounds  No murmur heard  No friction rub  No gallop  Pulmonary:      Effort: Pulmonary effort is normal  No respiratory distress  Breath sounds: Normal breath sounds  No wheezing or rales  Musculoskeletal:         General: Normal range of motion  Cervical back: Normal range of motion and neck supple  Right lower leg: No edema  Left lower leg: No edema  Skin:     General: Skin is warm and dry  Findings: No rash  Neurological:      Mental Status: He is alert and oriented to person, place, and time  Gait: Gait normal    Psychiatric:         Mood and Affect: Mood is anxious and depressed  Behavior: Behavior normal          Thought Content: Thought content normal  Thought content does not include homicidal or suicidal ideation  Thought content does not include homicidal or suicidal plan           Judgment: Judgment normal        Cephus Krabbe, PA-C

## 2023-06-21 NOTE — ASSESSMENT & PLAN NOTE
Recommended that patient continue to follow with psychiatry  Encourage patient to establish with a counselor

## 2023-06-21 NOTE — ASSESSMENT & PLAN NOTE
Recommended that patient continue to follow with psychiatry  Encouraged him to establish with a counselor

## 2023-08-07 ENCOUNTER — HOSPITAL ENCOUNTER (EMERGENCY)
Facility: HOSPITAL | Age: 23
Discharge: HOME | End: 2023-08-07
Attending: EMERGENCY MEDICINE
Payer: COMMERCIAL

## 2023-08-07 ENCOUNTER — APPOINTMENT (EMERGENCY)
Dept: CT IMAGING | Facility: HOSPITAL | Age: 23
End: 2023-08-07
Payer: COMMERCIAL

## 2023-08-07 ENCOUNTER — OFFICE VISIT (OUTPATIENT)
Dept: FAMILY MEDICINE CLINIC | Facility: CLINIC | Age: 23
End: 2023-08-07

## 2023-08-07 VITALS
SYSTOLIC BLOOD PRESSURE: 133 MMHG | BODY MASS INDEX: 35.28 KG/M2 | HEIGHT: 71 IN | WEIGHT: 252 LBS | HEART RATE: 83 BPM | DIASTOLIC BLOOD PRESSURE: 70 MMHG | TEMPERATURE: 96.9 F | OXYGEN SATURATION: 96 % | RESPIRATION RATE: 18 BRPM

## 2023-08-07 VITALS
HEIGHT: 71 IN | SYSTOLIC BLOOD PRESSURE: 128 MMHG | BODY MASS INDEX: 34.61 KG/M2 | OXYGEN SATURATION: 98 % | WEIGHT: 247.2 LBS | HEART RATE: 79 BPM | DIASTOLIC BLOOD PRESSURE: 78 MMHG

## 2023-08-07 DIAGNOSIS — F07.81 POST CONCUSSION SYNDROME: ICD-10-CM

## 2023-08-07 DIAGNOSIS — F07.81 CONCUSSION SYNDROME: ICD-10-CM

## 2023-08-07 DIAGNOSIS — M25.562 ACUTE PAIN OF LEFT KNEE: ICD-10-CM
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
## 2023-08-07 DIAGNOSIS — M25.512 ACUTE PAIN OF LEFT SHOULDER: ICD-10-CM

## 2023-08-07 PROCEDURE — 70450 CT HEAD/BRAIN W/O DYE: CPT

## 2023-08-07 PROCEDURE — 99284 EMERGENCY DEPT VISIT MOD MDM: CPT

## 2023-08-07 PROCEDURE — 99284 EMERGENCY DEPT VISIT MOD MDM: CPT | Performed by: EMERGENCY MEDICINE

## 2023-08-07 PROCEDURE — 99214 OFFICE O/P EST MOD 30 MIN: CPT | Performed by: PHYSICIAN ASSISTANT

## 2023-08-07 RX ORDER — IBUPROFEN 600 MG/1
600 TABLET ORAL EVERY 8 HOURS PRN
Qty: 30 TABLET | Refills: 0 | Status: SHIPPED | OUTPATIENT
Start: 2023-08-07
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
## 2023-08-07 NOTE — Clinical Note
Yemi Waite was seen and treated in our emergency department on 8/7/2023. Diagnosis: Flako Martinez  is off the rest of the shift today. He may return on this date: If you have any questions or concerns, please don't hesitate to call.       Christina Guerra, DO    ______________________________           _______________          _______________  Hospital Representative                              Date                                Time

## 2023-08-07 NOTE — LETTER
August 7, 2023     Patient: Alejandra Amos  YOB: 2000  Date of Visit: 8/7/2023      To Whom it May Concern:    Alejandra Amos is under my professional care. Albaro Waggoner was seen in my office on 8/7/2023. Albaro Waggoner may return to work on 8/16/2023 . If you have any questions or concerns, please don't hesitate to call.          Sincerely,          Yara Huizar PA-C
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
## 2023-08-07 NOTE — ASSESSMENT & PLAN NOTE
CT head in ER negative for any acute pathology  Recommended that he avoid electronics, get plenty of rest, may take Tylenol or Motrin as needed for headaches  Follow-up in 1 week for a recheck  If symptoms do not improve or worsen, will refer to concussion therapy

## 2023-08-07 NOTE — ASSESSMENT & PLAN NOTE
Recommended ice, rest, ibuprofen, and Tylenol as needed  He will follow-up in 1 week for a recheck to be cleared to return to work  He will call us sooner with any concerns
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
## 2023-08-07 NOTE — LETTER
August 7, 2023     Patient: Hi Pratt  YOB: 2000  Date of Visit: 8/7/2023      To Whom it May Concern:    Hi Pratt is under my professional care. Rosalind Riojas was seen in my office on 8/7/2023. Rosalind Riojas is going to be reevaluated next week (approximately    If you have any questions or concerns, please don't hesitate to call.          Sincerely,          Maira Rodriguez PA-C        CC: No Recipients

## 2023-08-15 ENCOUNTER — OFFICE VISIT (OUTPATIENT)
Dept: FAMILY MEDICINE CLINIC | Facility: CLINIC | Age: 23
End: 2023-08-15
Payer: COMMERCIAL

## 2023-08-15 VITALS
BODY MASS INDEX: 34.02 KG/M2 | HEART RATE: 97 BPM | DIASTOLIC BLOOD PRESSURE: 70 MMHG | SYSTOLIC BLOOD PRESSURE: 124 MMHG | HEIGHT: 71 IN | OXYGEN SATURATION: 98 % | WEIGHT: 243 LBS

## 2023-08-15 DIAGNOSIS — F07.81 POST CONCUSSION SYNDROME: ICD-10-CM

## 2023-08-15 DIAGNOSIS — M25.512 ACUTE PAIN OF LEFT SHOULDER: ICD-10-CM
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
## 2023-08-15 DIAGNOSIS — M25.562 ACUTE PAIN OF LEFT KNEE: ICD-10-CM

## 2023-08-15 PROCEDURE — 99214 OFFICE O/P EST MOD 30 MIN: CPT | Performed by: PHYSICIAN ASSISTANT

## 2023-08-15 NOTE — ASSESSMENT & PLAN NOTE
Recommended that he continue to avoid electronics, get plenty of rest, and may take Tylenol or Motrin as needed for headaches. Referral placed to PT for concussion therapy.

## 2023-08-15 NOTE — LETTER
August 15, 2023     Patient: Joel Christian  YOB: 2000  Date of Visit: 8/15/2023      To Whom it May Concern:    Joel Christian is under my professional care. Santhosh Khan was seen in my office on 8/15/2023. Santhosh Khan may return to work on 8/21/2023 . If you have any questions or concerns, please don't hesitate to call.          Sincerely,            Maribell He PA-C
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
## 2023-08-21 ENCOUNTER — OFFICE VISIT (OUTPATIENT)
Dept: FAMILY MEDICINE CLINIC | Facility: CLINIC | Age: 23
End: 2023-08-21
Payer: COMMERCIAL

## 2023-08-21 ENCOUNTER — TELEPHONE (OUTPATIENT)
Dept: FAMILY MEDICINE CLINIC | Facility: CLINIC | Age: 23
End: 2023-08-21

## 2023-08-21 VITALS
SYSTOLIC BLOOD PRESSURE: 118 MMHG | WEIGHT: 248.6 LBS | HEIGHT: 71 IN | HEART RATE: 105 BPM | BODY MASS INDEX: 34.8 KG/M2 | OXYGEN SATURATION: 98 % | DIASTOLIC BLOOD PRESSURE: 76 MMHG

## 2023-08-21 DIAGNOSIS — F07.81 POST CONCUSSION SYNDROME: Primary | ICD-10-CM
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
## 2023-08-21 PROCEDURE — 99213 OFFICE O/P EST LOW 20 MIN: CPT | Performed by: PHYSICIAN ASSISTANT
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
## 2023-08-21 NOTE — LETTER
August 21, 2023     Patient: Hoda Easton  YOB: 2000  Date of Visit: 8/21/2023      To Whom it May Concern:    Hoda Easton is under my professional care. Jim Andrews was seen in my office on 8/21/2023. Jim Andrews may return to work on 8/28/2023 . If you have any questions or concerns, please don't hesitate to call.          Sincerely,          Juice Dangelo PA-C

## 2023-08-21 NOTE — ASSESSMENT & PLAN NOTE
Recommended that he continue to avoid electronics, get plenty of rest, and may take Tylenol or Motrin as needed for headaches. Referral was placed to PT for concussion therapy, but patient never received call. Gave him the phone number to call and schedule. Referral placed to sports medicine for evaluation. CT head in ER was negative for any acute pathology. We will keep out of work for 1 additional week.
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
## 2023-08-23 ENCOUNTER — EVALUATION (OUTPATIENT)
Dept: PHYSICAL THERAPY | Facility: CLINIC | Age: 23
End: 2023-08-23
Payer: COMMERCIAL
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
## 2023-08-23 DIAGNOSIS — F07.81 POST CONCUSSION SYNDROME: ICD-10-CM

## 2023-08-23 PROCEDURE — 97535 SELF CARE MNGMENT TRAINING: CPT | Performed by: PHYSICAL THERAPIST

## 2023-08-23 PROCEDURE — 97161 PT EVAL LOW COMPLEX 20 MIN: CPT | Performed by: PHYSICAL THERAPIST

## 2023-08-23 PROCEDURE — 97112 NEUROMUSCULAR REEDUCATION: CPT | Performed by: PHYSICAL THERAPIST
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
## 2023-08-29 ENCOUNTER — TELEPHONE (OUTPATIENT)
Dept: FAMILY MEDICINE CLINIC | Facility: CLINIC | Age: 23
End: 2023-08-29

## 2023-08-29 ENCOUNTER — OFFICE VISIT (OUTPATIENT)
Dept: PHYSICAL THERAPY | Facility: CLINIC | Age: 23
End: 2023-08-29
Payer: COMMERCIAL
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
## 2023-08-29 DIAGNOSIS — F07.81 POST CONCUSSION SYNDROME: ICD-10-CM

## 2023-08-29 PROCEDURE — 97112 NEUROMUSCULAR REEDUCATION: CPT | Performed by: PHYSICAL THERAPIST

## 2023-08-29 PROCEDURE — 97530 THERAPEUTIC ACTIVITIES: CPT | Performed by: PHYSICAL THERAPIST

## 2023-08-29 PROCEDURE — 97110 THERAPEUTIC EXERCISES: CPT | Performed by: PHYSICAL THERAPIST
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
## 2023-08-29 NOTE — TELEPHONE ENCOUNTER
Pts mother, Dayan Cannon, states that it is not through [facility]. She states that the paper requires just a 'Plan of Action' to be completed.     Dayan Cannon can be reached at [phone]    Thank you

## 2023-08-29 NOTE — TELEPHONE ENCOUNTER
Elizabeth Rodriguez states that he got a message stating that Kamlesh Austin is not able to fill out his paperwork.      He is asking for someone to call him so he can explain it.    [phone]

## 2023-09-05 ENCOUNTER — OFFICE VISIT (OUTPATIENT)
Dept: PHYSICAL THERAPY | Facility: CLINIC | Age: 23
End: 2023-09-05
Payer: COMMERCIAL

## 2023-09-05 DIAGNOSIS — F07.81 POST CONCUSSION SYNDROME: ICD-10-CM
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
## 2023-09-05 PROCEDURE — 97112 NEUROMUSCULAR REEDUCATION: CPT | Performed by: PHYSICAL THERAPIST

## 2023-09-05 PROCEDURE — 97530 THERAPEUTIC ACTIVITIES: CPT | Performed by: PHYSICAL THERAPIST
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
## 2023-09-12 ENCOUNTER — APPOINTMENT (OUTPATIENT)
Dept: PHYSICAL THERAPY | Facility: CLINIC | Age: 23
End: 2023-09-12
Payer: COMMERCIAL

## 2023-09-13 DIAGNOSIS — M25.512 ACUTE PAIN OF LEFT SHOULDER: ICD-10-CM

## 2023-09-13 DIAGNOSIS — M25.562 ACUTE PAIN OF LEFT KNEE: ICD-10-CM
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
## 2023-09-13 DIAGNOSIS — F07.81 POST CONCUSSION SYNDROME: ICD-10-CM

## 2023-09-13 RX ORDER — IBUPROFEN 600 MG/1
600 TABLET ORAL EVERY 8 HOURS PRN
Qty: 30 TABLET | Refills: 0 | Status: SHIPPED | OUTPATIENT
Start: 2023-09-13

## 2023-09-19 ENCOUNTER — OFFICE VISIT (OUTPATIENT)
Dept: PHYSICAL THERAPY | Facility: CLINIC | Age: 23
End: 2023-09-19
Payer: COMMERCIAL

## 2023-09-19 DIAGNOSIS — F07.81 POST CONCUSSION SYNDROME: ICD-10-CM
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
## 2023-09-19 PROCEDURE — 97530 THERAPEUTIC ACTIVITIES: CPT | Performed by: PHYSICAL THERAPIST

## 2023-09-19 PROCEDURE — 97112 NEUROMUSCULAR REEDUCATION: CPT | Performed by: PHYSICAL THERAPIST

## 2023-09-19 PROCEDURE — 97535 SELF CARE MNGMENT TRAINING: CPT | Performed by: PHYSICAL THERAPIST
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
## 2023-09-26 ENCOUNTER — OFFICE VISIT (OUTPATIENT)
Dept: PHYSICAL THERAPY | Facility: CLINIC | Age: 23
End: 2023-09-26
Payer: COMMERCIAL

## 2023-09-26 DIAGNOSIS — F07.81 POST CONCUSSION SYNDROME: ICD-10-CM
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
## 2023-09-26 PROCEDURE — 97530 THERAPEUTIC ACTIVITIES: CPT | Performed by: PHYSICAL THERAPIST

## 2023-09-26 PROCEDURE — 97112 NEUROMUSCULAR REEDUCATION: CPT | Performed by: PHYSICAL THERAPIST
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
## 2023-10-02 ENCOUNTER — OFFICE VISIT (OUTPATIENT)
Dept: FAMILY MEDICINE CLINIC | Facility: CLINIC | Age: 23
End: 2023-10-02
Payer: COMMERCIAL

## 2023-10-02 VITALS
OXYGEN SATURATION: 97 % | HEIGHT: 71 IN | SYSTOLIC BLOOD PRESSURE: 118 MMHG | WEIGHT: 246.8 LBS | DIASTOLIC BLOOD PRESSURE: 72 MMHG | BODY MASS INDEX: 34.55 KG/M2 | HEART RATE: 105 BPM

## 2023-10-02 DIAGNOSIS — R51.9 CHRONIC NONINTRACTABLE HEADACHE, UNSPECIFIED HEADACHE TYPE: ICD-10-CM

## 2023-10-02 DIAGNOSIS — R11.2 NAUSEA AND VOMITING, UNSPECIFIED VOMITING TYPE: Primary | ICD-10-CM

## 2023-10-02 DIAGNOSIS — G89.29 CHRONIC NONINTRACTABLE HEADACHE, UNSPECIFIED HEADACHE TYPE: ICD-10-CM

## 2023-10-02 DIAGNOSIS — K21.9 GASTROESOPHAGEAL REFLUX DISEASE, UNSPECIFIED WHETHER ESOPHAGITIS PRESENT: ICD-10-CM

## 2023-10-02 PROBLEM — K92.0 HEMATEMESIS WITH NAUSEA: Status: RESOLVED | Noted: 2023-05-01 | Resolved: 2023-10-02

## 2023-10-02 PROCEDURE — 99214 OFFICE O/P EST MOD 30 MIN: CPT | Performed by: PHYSICIAN ASSISTANT

## 2023-10-02 RX ORDER — IBUPROFEN 600 MG/1
600 TABLET ORAL EVERY 6 HOURS PRN
Qty: 30 TABLET | Refills: 2 | Status: SHIPPED | OUTPATIENT
Start: 2023-10-02

## 2023-10-02 RX ORDER — SUCRALFATE ORAL 1 G/10ML
1 SUSPENSION ORAL
Qty: 414 ML | Refills: 1 | Status: SHIPPED | OUTPATIENT
Start: 2023-10-02

## 2023-10-03 ENCOUNTER — OFFICE VISIT (OUTPATIENT)
Dept: PHYSICAL THERAPY | Facility: CLINIC | Age: 23
End: 2023-10-03
Payer: COMMERCIAL

## 2023-10-03 DIAGNOSIS — F07.81 POST CONCUSSION SYNDROME: ICD-10-CM
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
## 2023-10-03 PROCEDURE — 97110 THERAPEUTIC EXERCISES: CPT

## 2023-10-03 PROCEDURE — 97112 NEUROMUSCULAR REEDUCATION: CPT

## 2023-10-03 NOTE — ASSESSMENT & PLAN NOTE
Refilled ibuprofen. Recommended that he use this sparingly and take with food to avoid GI upset. Referral placed to neurology.
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
## 2023-10-03 NOTE — ASSESSMENT & PLAN NOTE
Recommended that patient continue Pepcid and omeprazole. Recommended that he schedule a follow-up with gastroenterology. Recent EGD and colonoscopy were unremarkable. Will add on Carafate to see if this helps with nausea and vomiting. Recommended small, frequent meals. Suggested bland diet. Weight has been stable. He will notify us if symptoms do not improve or worsen.

## 2023-10-04 ENCOUNTER — OFFICE VISIT (OUTPATIENT)
Dept: PSYCHIATRY | Facility: CLINIC | Age: 23
End: 2023-10-04
Payer: COMMERCIAL

## 2023-10-04 DIAGNOSIS — F43.10 PTSD (POST-TRAUMATIC STRESS DISORDER): ICD-10-CM

## 2023-10-04 DIAGNOSIS — F33.41 RECURRENT MAJOR DEPRESSIVE DISORDER, IN PARTIAL REMISSION (HCC): Primary | ICD-10-CM

## 2023-10-04 DIAGNOSIS — F41.1 GENERALIZED ANXIETY DISORDER: ICD-10-CM

## 2023-10-04 DIAGNOSIS — G47.00 INSOMNIA, UNSPECIFIED TYPE: ICD-10-CM

## 2023-10-04 PROCEDURE — 99214 OFFICE O/P EST MOD 30 MIN: CPT

## 2023-10-04 RX ORDER — HYDROXYZINE 50 MG/1
50 TABLET, FILM COATED ORAL 2 TIMES DAILY PRN
Qty: 90 TABLET | Refills: 0 | Status: SHIPPED | OUTPATIENT
Start: 2023-10-04

## 2023-10-04 RX ORDER — TRAZODONE HYDROCHLORIDE 100 MG/1
100 TABLET ORAL
Qty: 30 TABLET | Refills: 2 | Status: SHIPPED | OUTPATIENT
Start: 2023-10-04 | End: 2024-01-02

## 2023-10-04 RX ORDER — SERTRALINE HYDROCHLORIDE 100 MG/1
200 TABLET, FILM COATED ORAL DAILY
Qty: 60 TABLET | Refills: 2 | Status: SHIPPED | OUTPATIENT
Start: 2023-10-04 | End: 2024-01-02

## 2023-10-04 NOTE — BH TREATMENT PLAN
TREATMENT PLAN (Medication Management Only)        [address] [facility]    Name and Date of Birth:  Barbra Garduno 21 y.o. 2000  Date of Treatment Plan: October 4, 2023  Diagnosis/Diagnoses:    1. Moderate episode of recurrent major depressive disorder ([address])    2. Generalized anxiety disorder    3. PTSD (post-traumatic stress disorder)    4. Insomnia, unspecified type      Strengths/Personal Resources for Self-Care: supportive family, taking medications as prescribed, ability to adapt to life changes. Area/Areas of need (in own words): anxiety symptoms, depressive symptoms  1. Long Term Goal: continue improvement in depression. Target Date:6 months - 4/4/2024  Person/Persons responsible for completion of goal: Corey  2. Short Term Objective (s) - How will we reach this goal?:   A. Provider new recommended medication/dosage changes and/or continue medication(s): continue current medications as prescribed. B. Attend medication management appointments regularly. C. Take psychiatric medications responsibly. Target Date:6 months - 4/4/2024  Person/Persons Responsible for Completion of Goal: Willard Childers  Progress Towards Goals: continuing treatment  Treatment Modality: medication management every 2 months  Review due 180 days from date of this plan: 6 months - 4/4/2024  Expected length of service: ongoing treatment  My Physician/PA/NP and I have developed this plan together and I agree to work on the goals and objectives. I understand the treatment goals that were developed for my treatment.
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
## 2023-10-06 ENCOUNTER — TELEPHONE (OUTPATIENT)
Dept: FAMILY MEDICINE CLINIC | Facility: CLINIC | Age: 23
End: 2023-10-06
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
## 2023-10-06 NOTE — TELEPHONE ENCOUNTER
Since the Carafate is not covered, he can try increasing the frequency of his omeprazole to twice daily.   I would like him to make sure that he schedules a follow-up with gastroenterology

## 2023-10-06 NOTE — TELEPHONE ENCOUNTER
Pt pharm denied request for SUCRALFATE - not FDA approved dx - must be ACTIVE duodenal ulcer  X  k26 .0 thru k26.3

## 2023-10-10 ENCOUNTER — OFFICE VISIT (OUTPATIENT)
Dept: PHYSICAL THERAPY | Facility: CLINIC | Age: 23
End: 2023-10-10
Payer: COMMERCIAL

## 2023-10-10 DIAGNOSIS — F07.81 POST CONCUSSION SYNDROME: ICD-10-CM
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
## 2023-10-10 PROCEDURE — 97535 SELF CARE MNGMENT TRAINING: CPT | Performed by: PHYSICAL THERAPIST

## 2023-10-10 PROCEDURE — 97112 NEUROMUSCULAR REEDUCATION: CPT | Performed by: PHYSICAL THERAPIST

## 2023-10-10 PROCEDURE — 97530 THERAPEUTIC ACTIVITIES: CPT | Performed by: PHYSICAL THERAPIST
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
## 2023-10-19 ENCOUNTER — DOCTOR'S OFFICE (OUTPATIENT)
Dept: URBAN - NONMETROPOLITAN AREA CLINIC 1 | Facility: CLINIC | Age: 23
Setting detail: OPHTHALMOLOGY
End: 2023-10-19
Payer: COMMERCIAL

## 2023-10-19 ENCOUNTER — RX ONLY (RX ONLY)
Age: 23
End: 2023-10-19

## 2023-10-19 DIAGNOSIS — H52.12: ICD-10-CM

## 2023-10-19 DIAGNOSIS — H52.223: ICD-10-CM

## 2023-10-19 DIAGNOSIS — H52.4: ICD-10-CM

## 2023-10-19 PROBLEM — Z01.00 ENCOUNTER FOR EXAMINATION OF EYES AND VISION WITHOUT ABNORMAL FINDINGS 
- GOOD OCULAR HEALTH FROM EXTENT OF UNDILATED VIEW: Status: ACTIVE | Noted: 2023-10-19

## 2023-10-19 PROCEDURE — 92015 DETERMINE REFRACTIVE STATE: CPT

## 2023-10-19 PROCEDURE — 92004 COMPRE OPH EXAM NEW PT 1/>: CPT

## 2023-10-19 ASSESSMENT — REFRACTION_MANIFEST
OS_SPHERE: -0.25
OS_VA1: 20/20
OD_AXIS: 140
OS_AXIS: 020
OU_VA: 20/20
OD_CYLINDER: -0.50
OS_VA2: 20/20
OD_VA2: 20/20
OD_VA1: 20/20
OS_CYLINDER: -0.50
OD_SPHERE: PL

## 2023-10-19 ASSESSMENT — REFRACTION_AUTOREFRACTION
OD_SPHERE: +0.25
OS_SPHERE: PL
OS_AXIS: 04
OD_CYLINDER: -0.50
OS_CYLINDER: -0.50
OD_AXIS: 145

## 2023-10-19 ASSESSMENT — VISUAL ACUITY
OD_BCVA: 20/20-2
OS_BCVA: 20/20-1

## 2023-10-19 ASSESSMENT — CONFRONTATIONAL VISUAL FIELD TEST (CVF)
OD_FINDINGS: FULL
OS_FINDINGS: FULL

## 2023-10-19 ASSESSMENT — SPHEQUIV_DERIVED
OS_SPHEQUIV: -0.5
OD_SPHEQUIV: 0

## 2023-10-19 ASSESSMENT — TONOMETRY
OD_IOP_MMHG: 18
OS_IOP_MMHG: 18

## 2023-11-02 ENCOUNTER — OFFICE VISIT (OUTPATIENT)
Dept: GASTROENTEROLOGY | Facility: CLINIC | Age: 23
End: 2023-11-02
Payer: COMMERCIAL

## 2023-11-02 VITALS
HEART RATE: 80 BPM | SYSTOLIC BLOOD PRESSURE: 120 MMHG | BODY MASS INDEX: 34.44 KG/M2 | TEMPERATURE: 97.6 F | WEIGHT: 246 LBS | OXYGEN SATURATION: 98 % | HEIGHT: 71 IN | DIASTOLIC BLOOD PRESSURE: 78 MMHG | RESPIRATION RATE: 18 BRPM

## 2023-11-02 DIAGNOSIS — K21.9 GASTROESOPHAGEAL REFLUX DISEASE WITHOUT ESOPHAGITIS: Primary | ICD-10-CM

## 2023-11-02 DIAGNOSIS — R19.5 LOOSE STOOLS: ICD-10-CM

## 2023-11-02 DIAGNOSIS — K62.5 BRBPR (BRIGHT RED BLOOD PER RECTUM): ICD-10-CM

## 2023-11-02 DIAGNOSIS — K92.0 HEMATEMESIS WITH NAUSEA: ICD-10-CM

## 2023-11-02 PROCEDURE — 99214 OFFICE O/P EST MOD 30 MIN: CPT | Performed by: PHYSICIAN ASSISTANT

## 2023-11-02 RX ORDER — PANTOPRAZOLE SODIUM 40 MG/1
40 TABLET, DELAYED RELEASE ORAL 2 TIMES DAILY
Qty: 60 TABLET | Refills: 3 | Status: SHIPPED | OUTPATIENT
Start: 2023-11-02

## 2023-11-02 RX ORDER — ONDANSETRON 4 MG/1
4 TABLET, FILM COATED ORAL EVERY 8 HOURS PRN
Qty: 30 TABLET | Refills: 5 | Status: SHIPPED | OUTPATIENT
Start: 2023-11-02

## 2023-11-02 RX ORDER — FAMOTIDINE 40 MG/1
40 TABLET, FILM COATED ORAL
Qty: 30 TABLET | Refills: 3 | Status: SHIPPED | OUTPATIENT
Start: 2023-11-02

## 2023-11-02 NOTE — PATIENT INSTRUCTIONS
Stop omeprazole.  pantoprazole and take this before breakfast and before dinner.  famotidine and take this before bed. I sent in more ondansetron/Zofran which are nausea pills to take as needed. Keep working on small frequent meals and foods that are low in saturated fats as these may be difficult to propel through your stomach. Because you are still having significant nausea and vomiting, I am going to check a repeat gastric emptying study. Your colonoscopy thankfully did not show any evidence of inflammatory bowel disease. I think the source of bleeding was secondary to the internal hemorrhoids. Please start on a fiber supplement like Benefiber or Metamucil to help add bulk and formed your stool and keep you moving your bowels a bit more regularly.

## 2023-11-02 NOTE — PROGRESS NOTES
Krysta Amaro [facility] - Outpatient Follow-up Note  Cate Roth 21 y.o. male MRN: [id number]  Encounter: [id number]    ASSESSMENT AND PLAN:      1. Gastroesophageal reflux disease without esophagitis  2. Hematemesis with nausea    Pt with hx of heartburn symptoms with recent EGD demonstrating gastritis only. On omeprazole BID and H2RA, though today it seems pt may be taking all medications at once. We did review given years of omeprazole usage and what appears to be suboptimal response to medication, recommend formulary swap and will trial pantoprazole twice daily and famotidine nightly. Okay for PRN anti-emetics. Recommend repeating GES (last completed in 2019) to evaluate for delayed gastric emptying. Previous head CT from 08/2023 wnl. Consider fasting AM cortisol in future for comprehensive evaluation. Continue diet and lifestyle modifications for GERD. Continue cannabis cessation. If patient continues to have treatment refractory symptoms, I think it may be reasonable to evaluate for true acid exposure versus a functional complaint with either Bravo testing or 24-hour impedence pH testing.    - pantoprazole (PROTONIX) 40 mg tablet; Take 1 tablet (40 mg total) by mouth 2 (two) times a day  Dispense: 60 tablet; Refill: 3  - famotidine (PEPCID) 40 MG tablet; Take 1 tablet (40 mg total) by mouth daily at bedtime  Dispense: 30 tablet; Refill: 3  - ondansetron (ZOFRAN) 4 mg tablet; Take 1 tablet (4 mg total) by mouth every 8 (eight) hours as needed for nausea or vomiting  Dispense: 30 tablet; Refill: 5  - NM gastric emptying; Future    3. BRBPR (bright red blood per rectum)  4. Loose stools    Patient with a history of BRBPR, colonoscopy completed and this demonstrated internal hemorrhoids only. No evidence of IBD or microscopic colitis. Suspect patient has underlying irritable bowel syndrome with outlet bleeding.   Recommend he trial a fiber supplement to help add bulk and formed to his stool and improve his regularity so long as he tolerates this without adverse effect. Consider low FODMAP diet, Beano, IBgard. No indication to repeat endoscopic evaluation at this time. We will follow up in 3 months to reassess symptoms. ______________________________________________________________________    SUBJECTIVE: Patient is a 21 y.o. male who presents today for follow-up regarding nausea and emesis. Pmhx sig for GERD, generalized anxiety disorder, BPPV. Pt was last evaluated in 05/2023. At that time, he was complaining of intractable nausea, emesis, hematemesis. Previously on omeprazole but d/c. He was restarted on omeprazole, famotidine, and prescribed sucralfate for added relief. He underwent an upper endoscopy which demonstrated gastritis, though was otherwise largely unremarkable. He was also complaining of BRBPR, and underwent repeat colonoscopy which demonstrated internal hemorrhoids only. 11/02/23:     Patient shares that he was continuing to have heartburn despite increasing his omeprazole to twice daily dosing and taking nightly Pepcid. He is also still having intermittent nausea and nonbloody emesis, no recurrence of hematemesis. No dysphagia or odynophagia. Nausea and emesis can be triggered by certain foul smells and occur post-prandially. He notes some post-prandial early satiety. Despite this has been gaining weight. Does have migraines, though nausea occurs irregardless of this. Pertaining to bowels, pt shares his stool output tends to be soft. Usually 1 soft stool daily, though there can be several days between bowel movements. Denies any recurrence of BRBPR. No melena. No constipation or straining. No nocturnal Bms.      NSAIDs: prn   Cannabis: socially   Tobacco: none     05/2023: CT A/P: no acute findings   05/2023: Hb 16, MCV 88, platelets 157, BUN 17, creatinine 0.94, AST 28, ALT 46, ALP 84, albumin 4.4, T. bili 0.48, INR 0.97     Endoscopic history: EGD: 05/2023: Mild, patchy erythematous mucosa in the stomach, otherwise normal   A. Duodenum, biopsy: Fragments of duodenal mucosa with with features suggestive of peptic duodenitis. There is no blunting of the intestinal villi or increased number of intraepithelial lymphocytes to suggest the presence of gluten sensitive enteropathy. B. Stomach, biopsy: Fragments of gastric antral and oxyntic mucosa with mild chronic nonspecific inflammation. No Helicobacter pylori organisms are identified with routine H&E stain. Negative for intestinal metaplasia, dysplasia or carcinoma  EGD: 06/2019: LA Grade B esophagitis   Colon: 10/2020: Hyperplastic polyp only; normal mucosa  Colon: 05/2023: internal hemorrhoids   C. Colon, biopsy-random: Fragments of colonic mucosa without significant histopathologic changes. There is no increase in the number of intraepithelial lymphocytes or thickened basement membranes to suggest the presence of microscopic colitis    Review of Systems   Constitutional:  Negative for fever. Gastrointestinal:  Positive for abdominal pain, constipation, diarrhea, nausea and vomiting. Genitourinary:  Negative for dysuria, frequency and hematuria. Musculoskeletal:  Positive for myalgias. Negative for arthralgias. Neurological:  Negative for headaches.    Otherwise Per HPI    Historical Information   Past Medical History:   Diagnosis Date    Anxiety     Depression     Schizoaffective disorder ([address] [facility])      Past Surgical History:   Procedure Laterality Date    COLONOSCOPY       Social History   Social History     Substance and Sexual Activity   Alcohol Use Not Currently    Alcohol/week: 2.0 standard drinks of alcohol    Types: 2 Shots of liquor per week     Social History     Substance and Sexual Activity   Drug Use Yes    Types: Marijuana     Social History     Tobacco Use   Smoking Status Some Days    Years: 3.00    Types: Cigarettes   Smokeless Tobacco Never   Tobacco Comments    once monthy Family History   Problem Relation Age of Onset    Depression Mother     No Known Problems Father      Meds/Allergies       Current Outpatient Medications:     cariprazine (VRAYLAR) 3 MG capsule    cholecalciferol (VITAMIN D3) 1,000 units tablet    cyanocobalamin (VITAMIN B-12) 1000 MCG tablet    famotidine (PEPCID) 20 mg tablet    hydrOXYzine HCL (ATARAX) 50 mg tablet    ibuprofen (MOTRIN) 600 mg tablet    meclizine (ANTIVERT) 25 mg tablet    omeprazole (PriLOSEC) 40 MG capsule    ondansetron (ZOFRAN) 4 mg tablet    sertraline (ZOLOFT) 100 mg tablet    sodium chloride (OCEAN) 0.65 % nasal spray    sucralfate (CARAFATE) 1 g/10 mL suspension    traZODone (DESYREL) 100 mg tablet    No Known Allergies    Objective     Blood pressure 120/78, pulse 80, temperature 97.6 °F (36.4 °C), temperature source Tympanic, resp. rate 18, height 5' 11" (1.803 m), weight 112 kg (246 lb), SpO2 98 %. Body mass index is 34.31 kg/m². Physical Exam  Vitals and nursing note reviewed. Constitutional:       Appearance: Normal appearance. HENT:      Head: Normocephalic and atraumatic. Eyes:      General: No scleral icterus. Conjunctiva/sclera: Conjunctivae normal.   Cardiovascular:      Rate and Rhythm: Normal rate. Pulmonary:      Effort: Pulmonary effort is normal. No respiratory distress. Abdominal:      General: Bowel sounds are normal. There is no distension. Palpations: Abdomen is soft. Tenderness: There is no abdominal tenderness. There is no guarding or rebound. Skin:     General: Skin is warm and dry. Neurological:      General: No focal deficit present. Mental Status: He is alert and oriented to person, place, and time. Psychiatric:         Mood and Affect: Mood normal.         Behavior: Behavior normal.       Lab Results:   No visits with results within 1 Day(s) from this visit.    Latest known visit with results is:   Admission on 06/08/2023, Discharged on 06/09/2023   Component Date Value Amph/Meth UR 06/08/2023 Negative     Barbiturate Ur 06/08/2023 Negative     Benzodiazepine Urine 06/08/2023 Negative     Cocaine Urine 06/08/2023 Negative     Methadone Urine 06/08/2023 Negative     Opiate Urine 06/08/2023 Negative     PCP Ur 06/08/2023 Negative     THC Urine 06/08/2023 Negative     Oxycodone Urine 06/08/2023 Negative     EXTBreath Alcohol 06/08/2023 0.000      Radiology Results:   No results found. Caren Bailey PA-C    **Please note:  Dictation voice to text software may have been used in the creation of this record. Occasional wrong word or “sound alike” substitutions may have occurred due to the inherent limitations of voice recognition software. Read the chart carefully and recognize, using context, where substitutions have occurred. **

## 2023-11-10 ENCOUNTER — HOSPITAL ENCOUNTER (OUTPATIENT)
Dept: NUCLEAR MEDICINE | Facility: HOSPITAL | Age: 23
End: 2023-11-10
Payer: COMMERCIAL

## 2023-11-10 DIAGNOSIS — K92.0 HEMATEMESIS WITH NAUSEA: ICD-10-CM

## 2023-11-10 PROCEDURE — G1004 CDSM NDSC: HCPCS

## 2023-11-10 PROCEDURE — A9541 TC99M SULFUR COLLOID: HCPCS

## 2023-11-10 PROCEDURE — 78264 GASTRIC EMPTYING IMG STUDY: CPT

## 2023-12-07 ENCOUNTER — TELEPHONE (OUTPATIENT)
Dept: PSYCHIATRY | Facility: CLINIC | Age: 23
End: 2023-12-07

## 2023-12-19 NOTE — TELEPHONE ENCOUNTER
Contacted patient off of Talk Therapy  to verify needs of services in attempts to offer patient an appointment at [facility] . LVM for patient to contact intake dept  in regards to wait list    Patient removed from list due to unable to contact patient.   
Contacted patient off of Talk Therapy  to verify needs of services in attempts to offer patient an appointment at [facility] .  LV for patient to contact intake dept  in regards to wait list
Statement Selected

## 2024-01-10 DIAGNOSIS — F41.1 GENERALIZED ANXIETY DISORDER: ICD-10-CM

## 2024-01-10 DIAGNOSIS — F33.41 RECURRENT MAJOR DEPRESSIVE DISORDER, IN PARTIAL REMISSION (HCC): ICD-10-CM

## 2024-01-10 RX ORDER — TRAZODONE HYDROCHLORIDE 100 MG/1
100 TABLET ORAL
Qty: 30 TABLET | Refills: 1 | Status: SHIPPED | OUTPATIENT
Start: 2024-01-10 | End: 2024-03-10

## 2024-01-10 RX ORDER — SERTRALINE HYDROCHLORIDE 100 MG/1
200 TABLET, FILM COATED ORAL DAILY
Qty: 60 TABLET | Refills: 1 | Status: SHIPPED | OUTPATIENT
Start: 2024-01-10 | End: 2024-03-10

## 2024-02-08 ENCOUNTER — OFFICE VISIT (OUTPATIENT)
Dept: PSYCHIATRY | Facility: CLINIC | Age: 24
End: 2024-02-08
Payer: COMMERCIAL

## 2024-02-08 DIAGNOSIS — F41.1 GENERALIZED ANXIETY DISORDER: ICD-10-CM

## 2024-02-08 DIAGNOSIS — F33.41 RECURRENT MAJOR DEPRESSIVE DISORDER, IN PARTIAL REMISSION (HCC): Primary | ICD-10-CM

## 2024-02-08 PROBLEM — M25.531 RIGHT WRIST PAIN: Status: ACTIVE | Noted: 2024-01-23

## 2024-02-08 PROCEDURE — 99214 OFFICE O/P EST MOD 30 MIN: CPT

## 2024-02-08 NOTE — PSYCH
Regular Visit    Problem List Items Addressed This Visit          Other    Generalized anxiety disorder     Other Visit Diagnoses       Recurrent major depressive disorder, in partial remission (HCC)    -  Primary                 Encounter provider HOLGER Peace    Provider located at    [address] [facility] [address] [phone]    Recent Visits  No visits were found meeting these conditions.  Showing recent visits within past 7 days and meeting all other requirements  Future Appointments  No visits were found meeting these conditions.  Showing future appointments within next 150 days and meeting all other requirements       HPI     Current Outpatient Medications   Medication Sig Dispense Refill    cariprazine (VRAYLAR) 3 MG capsule Take 1 capsule (3 mg total) by mouth daily 30 capsule 1    sertraline (ZOLOFT) 100 mg tablet Take 2 tablets (200 mg total) by mouth daily 60 tablet 1    traZODone (DESYREL) 100 mg tablet Take 1 tablet (100 mg total) by mouth daily at bedtime 30 tablet 1    cholecalciferol (VITAMIN D3) 1,000 units tablet Take 1 tablet (1,000 Units total) by mouth daily Do not start before Ashley 15, 2023. 30 tablet 0    cyanocobalamin (VITAMIN B-12) 1000 MCG tablet Take 1 tablet (1,000 mcg total) by mouth daily Do not start before Ashley 15, 2023. 30 tablet 0    famotidine (PEPCID) 40 MG tablet Take 1 tablet (40 mg total) by mouth daily at bedtime 30 tablet 3    hydrOXYzine HCL (ATARAX) 50 mg tablet Take 1 tablet (50 mg total) by mouth 2 (two) times a day as needed for itching 90 tablet 0    ibuprofen (MOTRIN) 600 mg tablet Take 1 tablet (600 mg total) by mouth every 6 (six) hours as needed for headaches 30 tablet 2    meclizine (ANTIVERT) 25 mg tablet Take 1 tablet (25 mg total) by mouth every 8 (eight) hours as needed for dizziness or nausea 30 tablet 0    ondansetron (ZOFRAN) 4 mg tablet Take 1 tablet (4 mg total)  by mouth every 8 (eight) hours as needed for nausea or vomiting 30 tablet 5    pantoprazole (PROTONIX) 40 mg tablet Take 1 tablet (40 mg total) by mouth 2 (two) times a day 60 tablet 3    sodium chloride (OCEAN) 0.65 % nasal spray 1 spray into each nostril every hour as needed for congestion 104 mL 0     No current facility-administered medications for this visit.       Review of Systems        MEDICATION MANAGEMENT NOTE        [facility]    Name and Date of Birth:  Corey Parker 23 y.o. 2000 MRN: [id number]    Date of Visit: February 8, 2024    No Known Allergies  SUBJECTIVE:    Corey is seen today for a follow up for Major Depressive Disorder, Generalized Anxiety Disorder and insomnia. He continues to do fairly well since the last visit. Patient shares his recent accident at work where a machine sprained his wrist, currently on worker's compensation and in a soft cast.  He is receiving appropriate medical condition, some numbness in his fingers which is getting worked up.  States he is handling the stressors adequately with no severe frustration, anger, depression.  He is rather bright, optimistic, pleasant, talkative during today's encounter.  Appears to enjoy the social aspect of talking to provider.  Reports chronic 4/10 depression is lessened over the past 2 months, denies any debilitating symptoms of this time.  Continues to find significant benefit from Vraylar and Zoloft combination with no reported side effects.  Continues to sleep well with trazodone.  Identifies his mother as a strong support system.  He is looking forward to returning to work when his hand heals as he is getting bored sitting around at home.  He denies suicidal ideation.        He denies any side effects from medications.    PLAN:    -Continue Zoloft to 200 mg daily for depressive and anxiety symptoms   PARQ completed including serotonin syndrome, SIADH, worsening depression,  suicidality, induction of aron, GI upset, headaches, activation, sexual side effects, sedation, potential drug interactions, and others.     -Continue Vraylar 3mg for treatment resistant depression and augmentation of Zoloft   Patient education completed including dizziness, sedation, GI distress, Akathisia, agitation, orthostatic hypotension, headache, and cardiovascular risks, metabolic syndrome, NMS, TD, EPS, Seizures, and others      -Continue prn Atarax 50mg daily for breakthrough anxiety     -Continue trazodone 100 mg at bedtime for insomnia.       Aware of 24 hour and weekend coverage for urgent situations accessed by calling [facility] main practice number  Referral for individual psychotherapy    Diagnoses and all orders for this visit:    Recurrent major depressive disorder, in partial remission (HCC)    Generalized anxiety disorder          Current Outpatient Medications on File Prior to Visit   Medication Sig Dispense Refill    cariprazine (VRAYLAR) 3 MG capsule Take 1 capsule (3 mg total) by mouth daily 30 capsule 1    sertraline (ZOLOFT) 100 mg tablet Take 2 tablets (200 mg total) by mouth daily 60 tablet 1    traZODone (DESYREL) 100 mg tablet Take 1 tablet (100 mg total) by mouth daily at bedtime 30 tablet 1    cholecalciferol (VITAMIN D3) 1,000 units tablet Take 1 tablet (1,000 Units total) by mouth daily Do not start before Ashley 15, 2023. 30 tablet 0    cyanocobalamin (VITAMIN B-12) 1000 MCG tablet Take 1 tablet (1,000 mcg total) by mouth daily Do not start before Ashley 15, 2023. 30 tablet 0    famotidine (PEPCID) 40 MG tablet Take 1 tablet (40 mg total) by mouth daily at bedtime 30 tablet 3    hydrOXYzine HCL (ATARAX) 50 mg tablet Take 1 tablet (50 mg total) by mouth 2 (two) times a day as needed for itching 90 tablet 0    ibuprofen (MOTRIN) 600 mg tablet Take 1 tablet (600 mg total) by mouth every 6 (six) hours as needed for headaches 30 tablet 2    meclizine (ANTIVERT) 25  mg tablet Take 1 tablet (25 mg total) by mouth every 8 (eight) hours as needed for dizziness or nausea 30 tablet 0    ondansetron (ZOFRAN) 4 mg tablet Take 1 tablet (4 mg total) by mouth every 8 (eight) hours as needed for nausea or vomiting 30 tablet 5    pantoprazole (PROTONIX) 40 mg tablet Take 1 tablet (40 mg total) by mouth 2 (two) times a day 60 tablet 3    sodium chloride (OCEAN) 0.65 % nasal spray 1 spray into each nostril every hour as needed for congestion 104 mL 0     No current facility-administered medications on file prior to visit.         HPI ROS Appetite Changes and Sleep:     He reports normal sleep, adequate appetite, fluctuating energy levels. Denies homicidal ideation, denies suicidal ideation    Review Of Systems:      HPI ROS:               Medication Side Effects:  denies    Depression (10 worst): 4/10    Anxiety (10 worst): 6/10    Safety concerns (SI, HI, etc): Denies si and hi    Sleep: 6-8 hrs    Energy: adequate    Appetite: 2-3 meals     Weight Change: denies        Mental Status Evaluation:    Appearance Adequate hygiene and grooming   Behavior calm and cooperative   Mood anxious and depressed  Depression Scale - 4 of 10 (0 = No depression)  Anxiety Scale - 6 of 10 (0 = No anxiety)   Speech Normal rate and volume   Affect constricted   Thought Processes Goal directed and coherent   Thought Content Does not verbalize delusional material   Associations Tightly connected   Perceptual Disturbances Denies hallucinations and does not appear to be responding to internal stimuli   Risk Potential Suicidal/Homicidal Ideation - No evidence of suicidal or homicidal ideation and patient does not verbalize suicidal or homicidal ideation  Risk of Violence - No evidence of risk for violence found on assessment  Risk of Self Mutilation - No evidence of risk for self mutilation found on assessment   Orientation oriented to person, place, time/date and situation   Memory recent and remote memory grossly  intact   Consciousness alert and awake   Attention/Concentration attention span and concentration are age appropriate   Insight intact   Judgement intact   Muscle Strength and Gait normal muscle strength and normal muscle tone, normal gait/station and normal balance   Motor Activity no abnormal movements   Language no difficulty naming common objects, no difficulty repeating a phrase, no difficulty writing a sentence   Fund of Knowledge adequate knowledge of current events  adequate fund of knowledge regarding past history  adequate fund of knowledge regarding vocabulary      Traumatic History Update:     New Onset of Abuse Since Last Encounter:   no  Traumatic Events Since Last Encounter:   no    Past Medical History:    Past Medical History:   Diagnosis Date    Anxiety     Depression     Schizoaffective disorder (HCC)         Past Surgical History:   Procedure Laterality Date    COLONOSCOPY       No Known Allergies  Substance Abuse History:    Social History     Substance and Sexual Activity   Alcohol Use Not Currently    Alcohol/week: 2.0 standard drinks of alcohol    Types: 2 Shots of liquor per week     Social History     Substance and Sexual Activity   Drug Use Yes    Types: Marijuana     Social History:    Social History     Socioeconomic History    Marital status: Single     Spouse name: Not on file    Number of children: Not on file    Years of education: Not on file    Highest education level: Not on file   Occupational History    Not on file   Tobacco Use    Smoking status: Some Days     Types: Cigarettes    Smokeless tobacco: Never    Tobacco comments:     once monthy   Vaping Use    Vaping status: Former    Substances: CBD   Substance and Sexual Activity    Alcohol use: Not Currently     Alcohol/week: 2.0 standard drinks of alcohol     Types: 2 Shots of liquor per week    Drug use: Yes     Types: Marijuana    Sexual activity: Yes     Partners: Female   Other Topics Concern    Not on file   Social  History Narrative    Not on file     Social Determinants of Health     Financial Resource Strain: Not on file   Food Insecurity: Not on file   Transportation Needs: Not on file   Physical Activity: Not on file   Stress: Not on file   Social Connections: Not on file   Intimate Partner Violence: Not on file   Housing Stability: Not on file     Family Psychiatric History:     Family History   Problem Relation Age of Onset    Depression Mother     No Known Problems Father      History Review:The following portions of the patient's history were reviewed and updated as appropriate: allergies, current medications, past family history, past medical history, past social history, past surgical history and problem list     OBJECTIVE:     Vital signs in last 24 hours:    There were no vitals filed for this visit.  Laboratory Results:   Recent Labs (last 2 months):   No visits with results within 2 Month(s) from this visit.   Latest known visit with results is:   Admission on 06/09/2023, Discharged on 06/14/2023   Component Date Value    Sodium 06/10/2023 135     Potassium 06/10/2023 3.7     Chloride 06/10/2023 100     CO2 06/10/2023 25     ANION GAP 06/10/2023 10     BUN 06/10/2023 17     Creatinine 06/10/2023 0.99     Glucose 06/10/2023 126     Calcium 06/10/2023 10.2     AST 06/10/2023 33     ALT 06/10/2023 51     Alkaline Phosphatase 06/10/2023 88     Total Protein 06/10/2023 8.1     Albumin 06/10/2023 4.5     Total Bilirubin 06/10/2023 0.73     eGFR 06/10/2023 106     WBC 06/10/2023 6.97     RBC 06/10/2023 5.27     Hemoglobin 06/10/2023 16.4     Hematocrit 06/10/2023 47.3     MCV 06/10/2023 90     MCH 06/10/2023 31.1     MCHC 06/10/2023 34.7     RDW 06/10/2023 12.1     MPV 06/10/2023 10.6     Platelets 06/10/2023 300     nRBC 06/10/2023 0     Neutrophils Relative 06/10/2023 60     Immat GRANS % 06/10/2023 0     Lymphocytes Relative 06/10/2023 27     Monocytes Relative 06/10/2023 7     Eosinophils Relative 06/10/2023 5      Basophils Relative 06/10/2023 1     Neutrophils Absolute 06/10/2023 4.17     Immature Grans Absolute 06/10/2023 0.02     Lymphocytes Absolute 06/10/2023 1.91     Monocytes Absolute 06/10/2023 0.50     Eosinophils Absolute 06/10/2023 0.33     Basophils Absolute 06/10/2023 0.04     TSH 3RD GENERATON 06/10/2023 1.280     Cholesterol 06/10/2023 218 (H)     Triglycerides 06/10/2023 161 (H)     HDL, Direct 06/10/2023 44     LDL Calculated 06/10/2023 142 (H)     Non-HDL-Chol (CHOL-HDL) 06/10/2023 174     Folate 06/10/2023 10.7     Vitamin B-12 06/10/2023 228     Vit D, 25-Hydroxy 06/10/2023 22.2 (L)     Ventricular Rate 06/10/2023 95     Atrial Rate 06/10/2023 95     OK Interval 06/10/2023 172     QRSD Interval 06/10/2023 100     QT Interval 06/10/2023 350     QTC Interval 06/10/2023 439     P Axis 06/10/2023 68     QRS [place] 06/10/2023 93     T Wave Axis 06/10/2023 32      I have personally reviewed all pertinent laboratory/tests results.    Suicide/Homicide Risk Assessment:    Risk of Harm to Self:  Protective Factors: no current suicidal ideation, access to mental health treatment, compliant with medications, compliant with mental health treatment, connection to community, having a desire to be alive, having a sense of purpose or meaning in life, medical compliance, resiliency, stable job, strong relationships  Based on today's assessment, Corey presents the following risk of harm to self: minimal    Risk of Harm to Others:  Based on today's assessment, Corey presents the following risk of harm to others: minimal    The following interventions are recommended: referral for psychotherapy    Medications Risks/Benefits:      Risks, Benefits And Possible Side Effects Of Medications:    Discussed risks and benefits of treatment with patient including risk of suicidality, serotonin syndrome, increased QTc interval and SIADH related to treatment with antidepressants; Risk of induction of manic symptoms in certain patient  populations and risk of parkinsonian symptoms, metabolic syndrome, tardive dyskinesia and neuroleptic malignant syndrome related to treatment with antipsychotic medications     Controlled Medication Discussion:     Not applicable    Treatment Plan:    Due for update/Updated:   no  Last treatment plan done 10/4, due 4/4/24    HOLGER Peace 02/08/24    Visit Time    Visit Start Time: 325  Visit Stop Time: 345  Total Visit Duration:  20 minutes

## 2024-02-08 NOTE — BH CRISIS PLAN
Hermila Safety Plan    Creation Date: 2/8/24       Step 1: Warning Signs:   Warning Signs   I get very overwhelmed, irritible            Step 2: Internal Coping Strategies:   Internal Coping Strategies   I play video games, talk to a friend            Step 3: People and social settings that provide distraction:   Name Contact Information   My mother Arely           Step 4: People whom I can ask for help during a crisis:      Step 5: Professionals or agencies I can contact during a crisis:    [facility]inican/Agency Name Phone Emergency Contact     psychiatry      Local Emergency Department Emergency Department Phone Emergency   Department Address    [address]        Crisis Phone Numbers:   Suicide Prevention Lifeline: Call or Text  982 Crisis Text Line: Text HOME   to [phone]   Please note: Some [facility] do not have a separate number for   Child/Adolescent specific crisis. If your county is not listed under   Child/Adolescent, please call the adult number for your county      Adult Crisis Numbers: Child/Adolescent Crisis Numbers   [facility]: [phone] [facility]: [phone]   [facility]: [phone] [facility]: [phone]   [facility]: [phone] [place]: [phone]   [facility]: [phone] [facility]: [phone]   [facility]: [phone]   [facility]: [phone]   [facility]: [phone]   [facility]: [phone] (daytime) [phone]   (after hours, weekends, holidays)      Step 6: Making the environment safer (plan for lethal means safety):   Patient did not identify any lethal methods: Yes     Optional: What is most important to me and worth living for?      Hermila Safety Plan. Elyssa Gibson and Luis F Mercado. Used with   permission of the authors.

## 2024-02-28 ENCOUNTER — RA CDI HCC (OUTPATIENT)
Dept: OTHER | Facility: HOSPITAL | Age: 24
End: 2024-02-28

## 2024-03-04 ENCOUNTER — OFFICE VISIT (OUTPATIENT)
Dept: FAMILY MEDICINE CLINIC | Facility: CLINIC | Age: 24
End: 2024-03-04
Payer: COMMERCIAL

## 2024-03-04 VITALS
DIASTOLIC BLOOD PRESSURE: 68 MMHG | HEART RATE: 112 BPM | BODY MASS INDEX: 34.33 KG/M2 | SYSTOLIC BLOOD PRESSURE: 122 MMHG | HEIGHT: 71 IN | TEMPERATURE: 98.6 F | WEIGHT: 245.2 LBS | OXYGEN SATURATION: 98 %

## 2024-03-04 DIAGNOSIS — J06.9 UPPER RESPIRATORY TRACT INFECTION, UNSPECIFIED TYPE: Primary | ICD-10-CM

## 2024-03-04 PROCEDURE — 99213 OFFICE O/P EST LOW 20 MIN: CPT | Performed by: PHYSICIAN ASSISTANT

## 2024-03-04 RX ORDER — AMOXICILLIN 500 MG/1
500 CAPSULE ORAL EVERY 8 HOURS SCHEDULED
Qty: 30 CAPSULE | Refills: 0 | Status: SHIPPED | OUTPATIENT
Start: 2024-03-04 | End: 2024-03-14

## 2024-03-04 RX ORDER — BENZONATATE 100 MG/1
100 CAPSULE ORAL 3 TIMES DAILY PRN
Qty: 30 CAPSULE | Refills: 0 | Status: SHIPPED | OUTPATIENT
Start: 2024-03-04

## 2024-03-04 NOTE — PROGRESS NOTES
Name: Corey Parker      : 2000      MRN: [id number]  Encounter Provider: Sheryl Henry PA-C  Encounter Date: 3/4/2024   Encounter department: [facility]    Assessment & Plan     1. Upper respiratory tract infection, unspecified type  Assessment & Plan:  Prescription for amoxicillin and Tessalon Perles  Recommended symptomatic relief  He will notify us if symptoms do not improve or worsen    Orders:  -     amoxicillin (AMOXIL) 500 mg capsule; Take 1 capsule (500 mg total) by mouth every 8 (eight) hours for 10 days  -     benzonatate (TESSALON PERLES) 100 mg capsule; Take 1 capsule (100 mg total) by mouth 3 (three) times a day as needed for cough           Subjective      Corey is a pleasant 23-year-old male who is here today complaining of cold-like symptoms for the past 2 weeks.  He is complaining of cough, congestion, runny nose, sore throat, and hoarse voice.  He has been taking over-the-counter cough medication, but is not providing much relief.  He denies any chest pains, shortness of breath, nausea, vomiting, diarrhea, fevers, or chills.  He denies any sick contacts.    Cough  This is a recurrent problem. The current episode started in the past 7 days. The problem has been rapidly worsening. The problem occurs constantly. The cough is Non-productive. Associated symptoms include headaches, rhinorrhea and a sore throat. Pertinent negatives include no chest pain, chills, ear congestion, ear pain, fever, heartburn, hemoptysis, myalgias, nasal congestion, postnasal drip, rash, shortness of breath, sweats, weight loss or wheezing. The symptoms are aggravated by exercise and lying down.     Review of Systems   Constitutional:  Negative for chills, diaphoresis, fatigue, fever and weight loss.   HENT:  Positive for congestion, rhinorrhea and sore throat. Negative for ear pain, postnasal drip, sneezing and trouble swallowing.    Eyes:  Negative for pain and visual disturbance.   Respiratory:   Positive for cough. Negative for apnea, hemoptysis, shortness of breath and wheezing.    Cardiovascular:  Negative for chest pain and palpitations.   Gastrointestinal:  Negative for abdominal pain, constipation, diarrhea, heartburn, nausea and vomiting.   Genitourinary:  Negative for dysuria.   Musculoskeletal:  Negative for arthralgias, gait problem and myalgias.   Skin:  Negative for rash.   Neurological:  Positive for headaches. Negative for dizziness, syncope, weakness, light-headedness and numbness.   Psychiatric/Behavioral:  Negative for suicidal ideas. The patient is not nervous/anxious.        Current Outpatient Medications on File Prior to Visit   Medication Sig   • cariprazine (VRAYLAR) 3 MG capsule Take 1 capsule (3 mg total) by mouth daily   • cholecalciferol (VITAMIN D3) 1,000 units tablet Take 1 tablet (1,000 Units total) by mouth daily Do not start before Ashley 15, 2023.   • cyanocobalamin (VITAMIN B-12) 1000 MCG tablet Take 1 tablet (1,000 mcg total) by mouth daily Do not start before Ashley 15, 2023.   • famotidine (PEPCID) 40 MG tablet Take 1 tablet (40 mg total) by mouth daily at bedtime   • hydrOXYzine HCL (ATARAX) 50 mg tablet Take 1 tablet (50 mg total) by mouth 2 (two) times a day as needed for itching   • ibuprofen (MOTRIN) 600 mg tablet Take 1 tablet (600 mg total) by mouth every 6 (six) hours as needed for headaches   • meclizine (ANTIVERT) 25 mg tablet Take 1 tablet (25 mg total) by mouth every 8 (eight) hours as needed for dizziness or nausea   • ondansetron (ZOFRAN) 4 mg tablet Take 1 tablet (4 mg total) by mouth every 8 (eight) hours as needed for nausea or vomiting   • pantoprazole (PROTONIX) 40 mg tablet Take 1 tablet (40 mg total) by mouth 2 (two) times a day   • sertraline (ZOLOFT) 100 mg tablet Take 2 tablets (200 mg total) by mouth daily   • sodium chloride (OCEAN) 0.65 % nasal spray 1 spray into each nostril every hour as needed for congestion   • traZODone (DESYREL) 100 mg tablet  "Take 1 tablet (100 mg total) by mouth daily at bedtime       Objective     /68   Pulse (!) 112   Temp 98.6 °F (37 °C)   Ht 5' 11\" (1.803 m)   Wt 111 kg (245 lb 3.2 oz)   SpO2 98%   BMI 34.20 kg/m²     Physical Exam  Vitals and nursing note reviewed.   Constitutional:       Appearance: He is well-developed.   HENT:      Head: Normocephalic and atraumatic.      Right Ear: Tympanic membrane, ear canal and external ear normal.      Left Ear: Tympanic membrane, ear canal and external ear normal.      Nose: Congestion present. No rhinorrhea.      Mouth/Throat:      Pharynx: Posterior oropharyngeal erythema present. No oropharyngeal exudate.      Tonsils: 3+ on the right. 3+ on the left.   Eyes:      Extraocular Movements: Extraocular movements intact.   Cardiovascular:      Rate and Rhythm: Normal rate and regular rhythm.      Heart sounds: Normal heart sounds. No murmur heard.     No friction rub. No gallop.   Pulmonary:      Effort: Pulmonary effort is normal. No respiratory distress.      Breath sounds: Normal breath sounds. No wheezing or rales.   Musculoskeletal:         General: Normal range of motion.      Cervical back: Normal range of motion and neck supple.      Right lower leg: No edema.      Left lower leg: No edema.   Skin:     General: Skin is warm and dry.      Findings: No rash.   Neurological:      Mental Status: He is alert and oriented to person, place, and time.      Gait: Gait normal.   Psychiatric:         Behavior: Behavior normal.         Thought Content: Thought content normal.         Judgment: Judgment normal.       Sheryl Henry PA-C    "

## 2024-03-04 NOTE — ASSESSMENT & PLAN NOTE
Prescription for amoxicillin and Tessalon Perles  Recommended symptomatic relief  He will notify us if symptoms do not improve or worsen

## 2024-04-24 ENCOUNTER — TELEPHONE (OUTPATIENT)
Dept: PSYCHIATRY | Facility: CLINIC | Age: 24
End: 2024-04-24

## 2024-04-24 ENCOUNTER — OFFICE VISIT (OUTPATIENT)
Dept: PSYCHIATRY | Facility: CLINIC | Age: 24
End: 2024-04-24
Payer: COMMERCIAL

## 2024-04-24 DIAGNOSIS — F41.1 GENERALIZED ANXIETY DISORDER: ICD-10-CM

## 2024-04-24 DIAGNOSIS — F33.1 MDD (MAJOR DEPRESSIVE DISORDER), RECURRENT EPISODE, MODERATE (HCC): Primary | ICD-10-CM

## 2024-04-24 PROCEDURE — 99214 OFFICE O/P EST MOD 30 MIN: CPT

## 2024-04-24 RX ORDER — HYDROXYZINE 50 MG/1
50 TABLET, FILM COATED ORAL 2 TIMES DAILY PRN
Qty: 90 TABLET | Refills: 0 | Status: SHIPPED | OUTPATIENT
Start: 2024-04-24

## 2024-04-24 NOTE — PSYCH
Regular Visit    Problem List Items Addressed This Visit          Behavioral Health    Generalized anxiety disorder    Relevant Medications    hydrOXYzine HCL (ATARAX) 50 mg tablet    cariprazine (VRAYLAR) 3 MG capsule     Other Visit Diagnoses       MDD (major depressive disorder), recurrent episode, moderate (HCC)    -  Primary    Relevant Medications    hydrOXYzine HCL (ATARAX) 50 mg tablet    cariprazine (VRAYLAR) 3 MG capsule                 Encounter provider HOLGER Peace    Provider located at    [facility]  [address] [facility] [address] [phone]    Recent Visits  Date Type Provider Dept   04/24/24 Office Visit HOLGER Peace  [facility]   Showing recent visits within past 7 days and meeting all other requirements  Future Appointments  No visits were found meeting these conditions.  Showing future appointments within next 150 days and meeting all other requirements       HPI     Current Outpatient Medications   Medication Sig Dispense Refill    benzonatate (TESSALON PERLES) 100 mg capsule Take 1 capsule (100 mg total) by mouth 3 (three) times a day as needed for cough 30 capsule 0    cariprazine (VRAYLAR) 3 MG capsule Take 1 capsule (3 mg total) by mouth daily 30 capsule 1    cholecalciferol (VITAMIN D3) 1,000 units tablet Take 1 tablet (1,000 Units total) by mouth daily Do not start before Ashley 15, 2023. 30 tablet 0    cyanocobalamin (VITAMIN B-12) 1000 MCG tablet Take 1 tablet (1,000 mcg total) by mouth daily Do not start before Ashley 15, 2023. 30 tablet 0    famotidine (PEPCID) 40 MG tablet Take 1 tablet (40 mg total) by mouth daily at bedtime 30 tablet 3    hydrOXYzine HCL (ATARAX) 50 mg tablet Take 1 tablet (50 mg total) by mouth 2 (two) times a day as needed for itching 90 tablet 0    ibuprofen (MOTRIN) 600 mg tablet Take 1 tablet (600 mg total) by mouth every 6 (six) hours as needed for  headaches 30 tablet 2    meclizine (ANTIVERT) 25 mg tablet Take 1 tablet (25 mg total) by mouth every 8 (eight) hours as needed for dizziness or nausea 30 tablet 0    ondansetron (ZOFRAN) 4 mg tablet Take 1 tablet (4 mg total) by mouth every 8 (eight) hours as needed for nausea or vomiting 30 tablet 5    pantoprazole (PROTONIX) 40 mg tablet Take 1 tablet (40 mg total) by mouth 2 (two) times a day 60 tablet 3    sodium chloride (OCEAN) 0.65 % nasal spray 1 spray into each nostril every hour as needed for congestion 104 mL 0    sertraline (ZOLOFT) 100 mg tablet Take 2 tablets (200 mg total) by mouth daily 60 tablet 1    traZODone (DESYREL) 100 mg tablet Take 1 tablet (100 mg total) by mouth daily at bedtime 30 tablet 1     No current facility-administered medications for this visit.       Review of Systems        MEDICATION MANAGEMENT NOTE        [facility]    Name and Date of Birth:  Corey Parker 23 y.o. 2000 MRN: [id number]    Date of Visit: April 30, 2024    No Known Allergies  SUBJECTIVE:    Corey is seen today for a follow up for Major Depressive Disorder, Generalized Anxiety Disorder and insomnia. He continues to experience on and off depressive symptoms since the last visit. Patient reports being let go of his job which is causing him to feel down, depressed, uncertain about his future.  States he is applying to all jobs possible, having no luck so far.  He has very little money, states he can barely leave the house because it cost too much.  Depression is increased to 6/10 with periods of dysphoria, inadequacy, guilt, feeling a failure, occasional passive suicidal ideation with no plan.  Agreeable to go to the ER immediately or call 911  if suicidal ideation with a plan ever develops.  Patient has the crisis line, hotline, warm line and agrees to seek immediate help if needed.  Identifies his entire family as protective factors.  He is very close to  his mother, admits he does not really share the way he feels to his mother, has desire to improve his life communication at providers recommendation.  Provider highly recommend psychotherapy to help him when external stressors occur such as now, he was added to the wait-list today.  Patient has difficulty regulating his emotions during stressful times and would highly benefit from weekly therapy.  He continues to report effectiveness of his medication regimen and believes the increase in depression symptoms are  related to not having a job.  No med changes made today, continue Vraylar 3 mg daily, Zoloft 200 mg daily, trazodone 100 mg at bedtime.  He reports adequate sleep and appetite.  He is hopeful to get a job so he can socialize with friends more often.  Does admit he is still going to [employer] once a week and enjoys the company of his peers.  Mood is constricted, pleasant, appropriate for situation.  Denies current SI and HI.        He denies any side effects from medications.    PLAN:    -Continue Zoloft to 200 mg daily for depressive and anxiety symptoms   PARQ completed including serotonin syndrome, SIADH, worsening depression, suicidality, induction of aron, GI upset, headaches, activation, sexual side effects, sedation, potential drug interactions, and others.     -Continue Vraylar 3mg for treatment resistant depression and augmentation of Zoloft   Patient education completed including dizziness, sedation, GI distress, Akathisia, agitation, orthostatic hypotension, headache, and cardiovascular risks, metabolic syndrome, NMS, TD, EPS, Seizures, and others      -Continue prn Atarax 50mg daily for breakthrough anxiety     -Continue trazodone 100 mg at bedtime for insomnia.       Aware of 24 hour and weekend coverage for urgent situations accessed by calling [facility] main practice number  Referral for individual psychotherapy    Diagnoses and all orders for this visit:    MDD (major  depressive disorder), recurrent episode, moderate (HCC)  -     cariprazine (VRAYLAR) 3 MG capsule; Take 1 capsule (3 mg total) by mouth daily    Generalized anxiety disorder  -     hydrOXYzine HCL (ATARAX) 50 mg tablet; Take 1 tablet (50 mg total) by mouth 2 (two) times a day as needed for itching          Current Outpatient Medications on File Prior to Visit   Medication Sig Dispense Refill    benzonatate (TESSALON PERLES) 100 mg capsule Take 1 capsule (100 mg total) by mouth 3 (three) times a day as needed for cough 30 capsule 0    cholecalciferol (VITAMIN D3) 1,000 units tablet Take 1 tablet (1,000 Units total) by mouth daily Do not start before Ashley 15, 2023. 30 tablet 0    cyanocobalamin (VITAMIN B-12) 1000 MCG tablet Take 1 tablet (1,000 mcg total) by mouth daily Do not start before Ashley 15, 2023. 30 tablet 0    famotidine (PEPCID) 40 MG tablet Take 1 tablet (40 mg total) by mouth daily at bedtime 30 tablet 3    ibuprofen (MOTRIN) 600 mg tablet Take 1 tablet (600 mg total) by mouth every 6 (six) hours as needed for headaches 30 tablet 2    meclizine (ANTIVERT) 25 mg tablet Take 1 tablet (25 mg total) by mouth every 8 (eight) hours as needed for dizziness or nausea 30 tablet 0    ondansetron (ZOFRAN) 4 mg tablet Take 1 tablet (4 mg total) by mouth every 8 (eight) hours as needed for nausea or vomiting 30 tablet 5    pantoprazole (PROTONIX) 40 mg tablet Take 1 tablet (40 mg total) by mouth 2 (two) times a day 60 tablet 3    sodium chloride (OCEAN) 0.65 % nasal spray 1 spray into each nostril every hour as needed for congestion 104 mL 0    sertraline (ZOLOFT) 100 mg tablet Take 2 tablets (200 mg total) by mouth daily 60 tablet 1    traZODone (DESYREL) 100 mg tablet Take 1 tablet (100 mg total) by mouth daily at bedtime 30 tablet 1     No current facility-administered medications on file prior to visit.         HPI ROS Appetite Changes and Sleep:     He reports normal sleep, adequate appetite, fluctuating energy  levels. Denies homicidal ideation, denies suicidal ideation    Review Of Systems:      HPI ROS:               Medication Side Effects:  denies    Depression (10 worst): 6/10    Anxiety (10 worst): 5/10    Safety concerns (SI, HI, etc): Denies si and hi    Sleep: 6-8 hrs    Energy: adequate    Appetite: 2-3 meals     Weight Change: denies        Mental Status Evaluation:    Appearance Adequate hygiene and grooming   Behavior calm and cooperative   Mood anxious and depressed  Depression Scale - 6 of 10 (0 = No depression)  Anxiety Scale - 5 of 10 (0 = No anxiety)   Speech Normal rate and volume   Affect constricted   Thought Processes Goal directed and coherent   Thought Content Does not verbalize delusional material   Associations Tightly connected   Perceptual Disturbances Denies hallucinations and does not appear to be responding to internal stimuli   Risk Potential Suicidal/Homicidal Ideation - No evidence of suicidal or homicidal ideation and patient does not verbalize suicidal or homicidal ideation  Risk of Violence - No evidence of risk for violence found on assessment  Risk of Self Mutilation - No evidence of risk for self mutilation found on assessment   Orientation oriented to person, place, time/date and situation   Memory recent and remote memory grossly intact   Consciousness alert and awake   Attention/Concentration attention span and concentration are age appropriate   Insight intact   Judgement intact   Muscle Strength and Gait normal muscle strength and normal muscle tone, normal gait/station and normal balance   Motor Activity no abnormal movements   Language no difficulty naming common objects, no difficulty repeating a phrase, no difficulty writing a sentence   Fund of Knowledge adequate knowledge of current events  adequate fund of knowledge regarding past history  adequate fund of knowledge regarding vocabulary      Traumatic History Update:     New Onset of Abuse Since Last  Encounter:   no  Traumatic Events Since Last Encounter:   no    Past Medical History:    Past Medical History:   Diagnosis Date    Anxiety     Depression     Schizoaffective disorder (HCC)         Past Surgical History:   Procedure Laterality Date    COLONOSCOPY       No Known Allergies  Substance Abuse History:    Social History     Substance and Sexual Activity   Alcohol Use Not Currently    Alcohol/week: 2.0 standard drinks of alcohol    Types: 2 Shots of liquor per week     Social History     Substance and Sexual Activity   Drug Use Yes    Types: Marijuana     Social History:    Social History     Socioeconomic History    Marital status: Single     Spouse name: Not on file    Number of children: Not on file    Years of education: Not on file    Highest education level: Not on file   Occupational History    Not on file   Tobacco Use    Smoking status: Some Days     Types: Cigarettes    Smokeless tobacco: Never    Tobacco comments:     once monthy   Vaping Use    Vaping status: Former    Substances: CBD   Substance and Sexual Activity    Alcohol use: Not Currently     Alcohol/week: 2.0 standard drinks of alcohol     Types: 2 Shots of liquor per week    Drug use: Yes     Types: Marijuana    Sexual activity: Yes     Partners: Female   Other Topics Concern    Not on file   Social History Narrative    Not on file     Social Determinants of Health     Financial Resource Strain: Not on file   Food Insecurity: Not on file   Transportation Needs: Not on file   Physical Activity: Not on file   Stress: Not on file   Social Connections: Not on file   Intimate Partner Violence: Not on file   Housing Stability: Not on file     Family Psychiatric History:     Family History   Problem Relation Age of Onset    Depression Mother     No Known Problems Father      History Review:The following portions of the patient's history were reviewed and updated as appropriate: allergies, current medications, past family history, past medical  history, past social history, past surgical history and problem list     OBJECTIVE:     Vital signs in last 24 hours:    There were no vitals filed for this visit.  Laboratory Results:   Recent Labs (last 2 months):   No visits with results within 2 Month(s) from this visit.   Latest known visit with results is:   Admission on 06/09/2023, Discharged on 06/14/2023   Component Date Value    Sodium 06/10/2023 135     Potassium 06/10/2023 3.7     Chloride 06/10/2023 100     CO2 06/10/2023 25     ANION GAP 06/10/2023 10     BUN 06/10/2023 17     Creatinine 06/10/2023 0.99     Glucose 06/10/2023 126     Calcium 06/10/2023 10.2     AST 06/10/2023 33     ALT 06/10/2023 51     Alkaline Phosphatase 06/10/2023 88     Total Protein 06/10/2023 8.1     Albumin 06/10/2023 4.5     Total Bilirubin 06/10/2023 0.73     eGFR 06/10/2023 106     WBC 06/10/2023 6.97     RBC 06/10/2023 5.27     Hemoglobin 06/10/2023 16.4     Hematocrit 06/10/2023 47.3     MCV 06/10/2023 90     MCH 06/10/2023 31.1     MCHC 06/10/2023 34.7     RDW 06/10/2023 12.1     MPV 06/10/2023 10.6     Platelets 06/10/2023 300     nRBC 06/10/2023 0     Segmented % 06/10/2023 60     Immature Grans % 06/10/2023 0     Lymphocytes % 06/10/2023 27     Monocytes % 06/10/2023 7     Eosinophils Relative 06/10/2023 5     Basophils Relative 06/10/2023 1     Absolute Neutrophils 06/10/2023 4.17     Absolute Immature Grans 06/10/2023 0.02     Absolute Lymphocytes 06/10/2023 1.91     Absolute Monocytes 06/10/2023 0.50     Eosinophils Absolute 06/10/2023 0.33     Basophils Absolute 06/10/2023 0.04     TSH 3RD GENERATON 06/10/2023 1.280     Cholesterol 06/10/2023 218 (H)     Triglycerides 06/10/2023 161 (H)     HDL, Direct 06/10/2023 44     LDL Calculated 06/10/2023 142 (H)     Non-HDL-Chol (CHOL-HDL) 06/10/2023 174     Folate 06/10/2023 10.7     Vitamin B-12 06/10/2023 228     Vit D, 25-Hydroxy 06/10/2023 22.2 (L)     Ventricular Rate 06/10/2023 95     Atrial Rate 06/10/2023 95      IN Interval 06/10/2023 172     QRSD Interval 06/10/2023 100     QT Interval 06/10/2023 350     QTC Interval 06/10/2023 439     P Axis 06/10/2023 68     QRS [place] 06/10/2023 93     T Wave Axis 06/10/2023 32      I have personally reviewed all pertinent laboratory/tests results.    Suicide/Homicide Risk Assessment:    Risk of Harm to Self:  Protective Factors: no current suicidal ideation, access to mental health treatment, compliant with medications, compliant with mental health treatment, connection to community, having a desire to be alive, having a sense of purpose or meaning in life, medical compliance, resiliency, stable job, strong relationships  Based on today's assessment, Corey presents the following risk of harm to self: minimal    Risk of Harm to Others:  Based on today's assessment, Corey presents the following risk of harm to others: minimal    The following interventions are recommended: referral for psychotherapy    Medications Risks/Benefits:      Risks, Benefits And Possible Side Effects Of Medications:    Discussed risks and benefits of treatment with patient including risk of suicidality, serotonin syndrome, increased QTc interval and SIADH related to treatment with antidepressants; Risk of induction of manic symptoms in certain patient populations and risk of parkinsonian symptoms, metabolic syndrome, tardive dyskinesia and neuroleptic malignant syndrome related to treatment with antipsychotic medications     Controlled Medication Discussion:     Not applicable    Treatment Plan:    Due for update/Updated:   no  Last treatment plan done 4/24, due 10/24/24    HOLGER Peace 04/30/24    Visit Time    Visit Start Time: 250  Visit Stop Time: 310  Total Visit Duration:  20 minutes

## 2024-04-24 NOTE — BH TREATMENT PLAN
TREATMENT PLAN (Medication Management Only)        [facility]    Name and Date of Birth:  Corey Parker 23 y.o. 2000  Date of Treatment Plan: April 24, 2024  Diagnosis/Diagnoses:    1. MDD (major depressive disorder), recurrent episode, moderate (HCC)    2. Generalized anxiety disorder      Strengths/Personal Resources for Self-Care: supportive family, taking medications as prescribed, ability to adapt to life changes.  Area/Areas of need (in own words): anxiety symptoms, depressive symptoms  1. Long Term Goal: continue improvement in depression.  Target Date:6 months - 10/24/2024  Person/Persons responsible for completion of goal: Corey  2.  Short Term Objective (s) - How will we reach this goal?:   A. Provider new recommended medication/dosage changes and/or continue medication(s): continue current medications as prescribed.  B. Attend medication management appointments regularly.  C. Take psychiatric medications responsibly.  Target Date:6 months - 10/24/2024  Person/Persons Responsible for Completion of Goal: Corey  Progress Towards Goals: continuing treatment  Treatment Modality: medication management every 2 months  Review due 180 days from date of this plan: 6 months - 10/24/2024  Expected length of service: ongoing treatment  My Physician/PA/NP and I have developed this plan together and I agree to work on the goals and objectives. I understand the treatment goals that were developed for my treatment.

## 2024-05-22 ENCOUNTER — TELEMEDICINE (OUTPATIENT)
Dept: PSYCHIATRY | Facility: CLINIC | Age: 24
End: 2024-05-22
Payer: COMMERCIAL

## 2024-05-22 DIAGNOSIS — F32.0 CURRENT MILD EPISODE OF MAJOR DEPRESSIVE DISORDER WITHOUT PRIOR EPISODE (HCC): ICD-10-CM

## 2024-05-22 DIAGNOSIS — F43.10 PTSD (POST-TRAUMATIC STRESS DISORDER): ICD-10-CM

## 2024-05-22 DIAGNOSIS — F33.1 MDD (MAJOR DEPRESSIVE DISORDER), RECURRENT EPISODE, MODERATE (HCC): Primary | ICD-10-CM

## 2024-05-22 PROCEDURE — 90791 PSYCH DIAGNOSTIC EVALUATION: CPT

## 2024-05-22 NOTE — BH TREATMENT PLAN
Outpatient Behavioral Health Psychotherapy Treatment Plan    Corey Parker  2000     Date of Initial Psychotherapy Assessment: 5/22/2024   Date of Current Treatment Plan: 05/22/24  Treatment Plan Target Date: 5/22/2025  Treatment Plan Expiration Date: 11/22/2024    Diagnosis:   1. MDD (major depressive disorder), recurrent episode, moderate (HCC)        2. PTSD (post-traumatic stress disorder)        3. Current mild episode of major depressive disorder without prior episode (HCC)            Area(s) of Need:  Depression, PTSD, angry outbursts    Long Term Goal 1 (in the client's own words): I want to learn to manage my PTSD and depression symptoms.    Stage of Change: Contemplation    Target Date for completion: 5/22/2025     Anticipated therapeutic modalities: trauma interventions, client centered, supportive     People identified to complete this goal: therapist, prescriber, Corye      Objective 1: (identify the means of measuring success in meeting the objective): Therapist will engage Corey in trauma informed interventions to help him manage his PTSD symptoms.  Corey will verbally report improved mood and symptoms at least 5 out of 7 days.          I am currently under the care of a [facility] psychiatric provider: yes    My [facility] psychiatric provider is: Dr Andres Hickey    I am currently taking psychiatric medications: Yes, but not as prescribed. (explain) sometimes I don't take my medication    I feel that I will be ready for discharge from mental health care when I reach the following (measurable goal/objective): When I can manage my anger and other PTSD symptoms on a regular basis    For children and adults who have a legal guardian:   Has there been any change to custody orders and/or guardianship status? NA. If yes, attach updated documentation.    I have created my Crisis Plan and have been offered a copy of this plan    Behavioral Health Treatment Plan [facility]: Diagnosis and Treatment Plan  explained to Corey Parker Corey Keith acknowledges an understanding of their diagnosis. Corey Keith agrees to this treatment plan.    I have been offered a copy of this Treatment Plan. Yes    Corey Parker, 2000, actively participated in the creation of this treatment plan during a virtual session, using the [employer] platform.   Corey Parker  provided verbal consent on 5/22/2024 at 2:34 PM. The treatment plan was transcribed into the Electronic Health Record at a later time.
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
## 2024-05-24 ENCOUNTER — TELEPHONE (OUTPATIENT)
Dept: BEHAVIORAL/MENTAL HEALTH CLINIC | Facility: CLINIC | Age: 24
End: 2024-05-24

## 2024-05-28 ENCOUNTER — TELEPHONE (OUTPATIENT)
Dept: PSYCHIATRY | Facility: CLINIC | Age: 24
End: 2024-05-28

## 2024-05-28 NOTE — TELEPHONE ENCOUNTER
Contacted patient in regards to [employer] requesting patient scheduled for rajesh spoke w. Patient whom stated they are already scheduled at this time

## 2024-06-04 ENCOUNTER — OFFICE VISIT (OUTPATIENT)
Dept: PSYCHIATRY | Facility: CLINIC | Age: 24
End: 2024-06-04

## 2024-06-04 ENCOUNTER — OFFICE VISIT (OUTPATIENT)
Dept: URGENT CARE | Facility: MEDICAL CENTER | Age: 24
End: 2024-06-04
Payer: COMMERCIAL

## 2024-06-04 VITALS
HEART RATE: 113 BPM | BODY MASS INDEX: 34.45 KG/M2 | WEIGHT: 247 LBS | SYSTOLIC BLOOD PRESSURE: 126 MMHG | DIASTOLIC BLOOD PRESSURE: 80 MMHG | TEMPERATURE: 97.9 F | OXYGEN SATURATION: 99 % | RESPIRATION RATE: 22 BRPM

## 2024-06-04 DIAGNOSIS — F41.1 GENERALIZED ANXIETY DISORDER: ICD-10-CM

## 2024-06-04 DIAGNOSIS — F43.10 PTSD (POST-TRAUMATIC STRESS DISORDER): ICD-10-CM

## 2024-06-04 DIAGNOSIS — F33.41 RECURRENT MAJOR DEPRESSIVE DISORDER, IN PARTIAL REMISSION (HCC): ICD-10-CM

## 2024-06-04 DIAGNOSIS — J06.9 VIRAL URI WITH COUGH: Primary | ICD-10-CM

## 2024-06-04 DIAGNOSIS — F33.1 MDD (MAJOR DEPRESSIVE DISORDER), RECURRENT EPISODE, MODERATE (HCC): Primary | ICD-10-CM

## 2024-06-04 PROCEDURE — 99213 OFFICE O/P EST LOW 20 MIN: CPT | Performed by: STUDENT IN AN ORGANIZED HEALTH CARE EDUCATION/TRAINING PROGRAM

## 2024-06-04 RX ORDER — HYDROXYZINE 50 MG/1
50 TABLET, FILM COATED ORAL 2 TIMES DAILY PRN
Qty: 60 TABLET | Refills: 2 | Status: SHIPPED | OUTPATIENT
Start: 2024-06-04

## 2024-06-04 RX ORDER — BENZONATATE 200 MG/1
200 CAPSULE ORAL 3 TIMES DAILY PRN
Qty: 20 CAPSULE | Refills: 0 | Status: SHIPPED | OUTPATIENT
Start: 2024-06-04

## 2024-06-04 RX ORDER — SERTRALINE HYDROCHLORIDE 100 MG/1
200 TABLET, FILM COATED ORAL DAILY
Qty: 60 TABLET | Refills: 2 | Status: SHIPPED | OUTPATIENT
Start: 2024-06-04 | End: 2024-09-02

## 2024-06-04 RX ORDER — TRAZODONE HYDROCHLORIDE 100 MG/1
100 TABLET ORAL
Qty: 30 TABLET | Refills: 2 | Status: SHIPPED | OUTPATIENT
Start: 2024-06-04 | End: 2024-09-02

## 2024-06-04 NOTE — PSYCH
Regular Visit    Problem List Items Addressed This Visit          Behavioral Health    Generalized anxiety disorder    PTSD (post-traumatic stress disorder)    MDD (major depressive disorder), recurrent episode, moderate (HCC) - Primary            Encounter provider HOLGER Peace    Provider located at    [address] [facility] [address] [phone]    Recent Visits  No visits were found meeting these conditions.  Showing recent visits within past 7 days and meeting all other requirements  Future Appointments  No visits were found meeting these conditions.  Showing future appointments within next 150 days and meeting all other requirements       HPI     Current Outpatient Medications   Medication Sig Dispense Refill    benzonatate (TESSALON PERLES) 100 mg capsule Take 1 capsule (100 mg total) by mouth 3 (three) times a day as needed for cough (Patient not taking: Reported on 6/4/2024) 30 capsule 0    benzonatate (TESSALON) 200 MG capsule Take 1 capsule (200 mg total) by mouth 3 (three) times a day as needed for cough 20 capsule 0    cariprazine (VRAYLAR) 3 MG capsule Take 1 capsule (3 mg total) by mouth daily 30 capsule 1    cholecalciferol (VITAMIN D3) 1,000 units tablet Take 1 tablet (1,000 Units total) by mouth daily Do not start before Ashley 15, 2023. (Patient not taking: Reported on 6/4/2024) 30 tablet 0    cyanocobalamin (VITAMIN B-12) 1000 MCG tablet Take 1 tablet (1,000 mcg total) by mouth daily Do not start before Ashley 15, 2023. (Patient not taking: Reported on 6/4/2024) 30 tablet 0    famotidine (PEPCID) 40 MG tablet Take 1 tablet (40 mg total) by mouth daily at bedtime 30 tablet 3    hydrOXYzine HCL (ATARAX) 50 mg tablet Take 1 tablet (50 mg total) by mouth 2 (two) times a day as needed for itching 90 tablet 0    ibuprofen (MOTRIN) 600 mg tablet Take 1 tablet (600 mg total) by mouth every 6 (six) hours as needed for  headaches 30 tablet 2    meclizine (ANTIVERT) 25 mg tablet Take 1 tablet (25 mg total) by mouth every 8 (eight) hours as needed for dizziness or nausea 30 tablet 0    ondansetron (ZOFRAN) 4 mg tablet Take 1 tablet (4 mg total) by mouth every 8 (eight) hours as needed for nausea or vomiting 30 tablet 5    pantoprazole (PROTONIX) 40 mg tablet Take 1 tablet (40 mg total) by mouth 2 (two) times a day 60 tablet 3    sertraline (ZOLOFT) 100 mg tablet Take 2 tablets (200 mg total) by mouth daily 60 tablet 1    sodium chloride (OCEAN) 0.65 % nasal spray 1 spray into each nostril every hour as needed for congestion (Patient not taking: Reported on 6/4/2024) 104 mL 0    traZODone (DESYREL) 100 mg tablet Take 1 tablet (100 mg total) by mouth daily at bedtime 30 tablet 1     No current facility-administered medications for this visit.       Review of Systems        MEDICATION MANAGEMENT NOTE        [facility]    Name and Date of Birth:  Corey Parker 24 y.o. 2000 MRN: [id number]    Date of Visit: June 4, 2024    No Known Allergies  SUBJECTIVE:    Corey is seen today for a follow up for Major Depressive Disorder, Generalized Anxiety Disorder and insomnia. He continues to do fairly well since the last visit. Patient reports having an improved week compared to last week when he struggled with depressive symptoms. Had period of passive SI last week, was able to speak to his mother and symptoms went away.  Continues to struggle finding an appropriate job for himself including feelings of dysphoria, inadequacy, guilt, feeling a failure.  Recently started a new job but they were not forthcoming regarding night shift hours and patient quit. Shares she has an upcoming interview at [employer] and is hopeful about this job.  Due to no income, struggling to pay bills, leave the house, hanging with friends, or do anything that he desires.  Due to the upcoming interview, mood  and spirits appear to be brightened this week, remains hopeful and optimistic regarding [employer].  He recently initiated psychotherapy, would like to see patient work on impulsivity, mood lability, coping skills, emotional tolerance in weekly psychotherapy.  He is not experiencing any passive suicidal ideation at this time, patient has the crisis number, hotline, suicidal line and agreeable to go to the ER immediately if suicidal ideation with plan ever develops.  Patient recently completed crisis plan.  Patient has strong support system in therapist, mother, friend group.  Mood is appropriate, pleasant, talkative during today's exam.  We will continue Zoloft 200 mg daily, Vraylar 3 mg daily for augmentation, trazodone 100 mg at bedtime denies any side effects medications, reports adequate sleep and appetite.  Continue psychotherapy.  Goal to acquire and sustain employment going forward.        He denies any side effects from medications.    PLAN:    -Continue Zoloft to 200 mg daily for depressive and anxiety symptoms   PARQ completed including serotonin syndrome, SIADH, worsening depression, suicidality, induction of aron, GI upset, headaches, activation, sexual side effects, sedation, potential drug interactions, and others.     -Continue Vraylar 3mg for treatment resistant depression and augmentation of Zoloft   Patient education completed including dizziness, sedation, GI distress, Akathisia, agitation, orthostatic hypotension, headache, and cardiovascular risks, metabolic syndrome, NMS, TD, EPS, Seizures, and others      -Continue prn Atarax 50mg daily for breakthrough anxiety     -Continue trazodone 100 mg at bedtime for insomnia.       Aware of 24 hour and weekend coverage for urgent situations accessed by calling [facility] main practice number  Referral for individual psychotherapy    Diagnoses and all orders for this visit:    MDD (major depressive disorder), recurrent episode,  moderate (HCC)    PTSD (post-traumatic stress disorder)    Generalized anxiety disorder          Current Outpatient Medications on File Prior to Visit   Medication Sig Dispense Refill    benzonatate (TESSALON PERLES) 100 mg capsule Take 1 capsule (100 mg total) by mouth 3 (three) times a day as needed for cough (Patient not taking: Reported on 6/4/2024) 30 capsule 0    cariprazine (VRAYLAR) 3 MG capsule Take 1 capsule (3 mg total) by mouth daily 30 capsule 1    cholecalciferol (VITAMIN D3) 1,000 units tablet Take 1 tablet (1,000 Units total) by mouth daily Do not start before Ashley 15, 2023. (Patient not taking: Reported on 6/4/2024) 30 tablet 0    cyanocobalamin (VITAMIN B-12) 1000 MCG tablet Take 1 tablet (1,000 mcg total) by mouth daily Do not start before Ashley 15, 2023. (Patient not taking: Reported on 6/4/2024) 30 tablet 0    famotidine (PEPCID) 40 MG tablet Take 1 tablet (40 mg total) by mouth daily at bedtime 30 tablet 3    hydrOXYzine HCL (ATARAX) 50 mg tablet Take 1 tablet (50 mg total) by mouth 2 (two) times a day as needed for itching 90 tablet 0    ibuprofen (MOTRIN) 600 mg tablet Take 1 tablet (600 mg total) by mouth every 6 (six) hours as needed for headaches 30 tablet 2    meclizine (ANTIVERT) 25 mg tablet Take 1 tablet (25 mg total) by mouth every 8 (eight) hours as needed for dizziness or nausea 30 tablet 0    ondansetron (ZOFRAN) 4 mg tablet Take 1 tablet (4 mg total) by mouth every 8 (eight) hours as needed for nausea or vomiting 30 tablet 5    pantoprazole (PROTONIX) 40 mg tablet Take 1 tablet (40 mg total) by mouth 2 (two) times a day 60 tablet 3    sertraline (ZOLOFT) 100 mg tablet Take 2 tablets (200 mg total) by mouth daily 60 tablet 1    sodium chloride (OCEAN) 0.65 % nasal spray 1 spray into each nostril every hour as needed for congestion (Patient not taking: Reported on 6/4/2024) 104 mL 0    traZODone (DESYREL) 100 mg tablet Take 1 tablet (100 mg total) by mouth daily at bedtime 30 tablet  1     No current facility-administered medications on file prior to visit.         HPI ROS Appetite Changes and Sleep:     He reports normal sleep, adequate appetite, fluctuating energy levels. Denies homicidal ideation, denies suicidal ideation    Review Of Systems:      HPI ROS:               Medication Side Effects:  denies    Depression (10 worst): 5/10    Anxiety (10 worst): 5/10    Safety concerns (SI, HI, etc): Denies si and hi    Sleep: 6-8 hrs    Energy: adequate    Appetite: 2-3 meals     Weight Change: denies        Mental Status Evaluation:    Appearance Adequate hygiene and grooming   Behavior calm and cooperative   Mood depressed  Depression Scale - 5 of 10 (0 = No depression)  Anxiety Scale - 5 of 10 (0 = No anxiety)   Speech Normal rate and volume   Affect constricted   Thought Processes Goal directed and coherent   Thought Content Does not verbalize delusional material   Associations Tightly connected   Perceptual Disturbances Denies hallucinations and does not appear to be responding to internal stimuli   Risk Potential Suicidal/Homicidal Ideation - No evidence of suicidal or homicidal ideation and patient does not verbalize suicidal or homicidal ideation  Risk of Violence - No evidence of risk for violence found on assessment  Risk of Self Mutilation - No evidence of risk for self mutilation found on assessment   Orientation oriented to person, place, time/date and situation   Memory recent and remote memory grossly intact   Consciousness alert and awake   Attention/Concentration attention span and concentration are age appropriate   Insight intact   Judgement intact   Muscle Strength and Gait normal muscle strength and normal muscle tone, normal gait/station and normal balance   Motor Activity no abnormal movements   Language no difficulty naming common objects, no difficulty repeating a phrase, no difficulty writing a sentence   Fund of Knowledge adequate knowledge of current events  adequate  fund of knowledge regarding past history  adequate fund of knowledge regarding vocabulary      Traumatic History Update:     New Onset of Abuse Since Last Encounter:   no  Traumatic Events Since Last Encounter:   no    Past Medical History:    Past Medical History:   Diagnosis Date    Anxiety     Depression     Schizoaffective disorder (HCC)         Past Surgical History:   Procedure Laterality Date    COLONOSCOPY       No Known Allergies  Substance Abuse History:    Social History     Substance and Sexual Activity   Alcohol Use Not Currently    Alcohol/week: 2.0 standard drinks of alcohol    Types: 2 Shots of liquor per week     Social History     Substance and Sexual Activity   Drug Use Yes    Types: Marijuana     Social History:    Social History     Socioeconomic History    Marital status: Single     Spouse name: Not on file    Number of children: Not on file    Years of education: Not on file    Highest education level: Not on file   Occupational History    Not on file   Tobacco Use    Smoking status: Some Days     Types: Cigarettes    Smokeless tobacco: Never    Tobacco comments:     once monthy   Vaping Use    Vaping status: Former    Substances: CBD   Substance and Sexual Activity    Alcohol use: Not Currently     Alcohol/week: 2.0 standard drinks of alcohol     Types: 2 Shots of liquor per week    Drug use: Yes     Types: Marijuana    Sexual activity: Yes     Partners: Female   Other Topics Concern    Not on file   Social History Narrative    Not on file     Social Determinants of Health     Financial Resource Strain: Not on file   Food Insecurity: Not on file   Transportation Needs: Not on file   Physical Activity: Not on file   Stress: Not on file   Social Connections: Not on file   Intimate Partner Violence: Not on file   Housing Stability: Not on file     Family Psychiatric History:     Family History   Problem Relation Age of Onset    Depression Mother     No Known Problems Father      History  Review:The following portions of the patient's history were reviewed and updated as appropriate: allergies, current medications, past family history, past medical history, past social history, past surgical history and problem list     OBJECTIVE:     Vital signs in last 24 hours:    There were no vitals filed for this visit.  Laboratory Results:   Recent Labs (last 2 months):   No visits with results within 2 Month(s) from this visit.   Latest known visit with results is:   Admission on 06/09/2023, Discharged on 06/14/2023   Component Date Value    Sodium 06/10/2023 135     Potassium 06/10/2023 3.7     Chloride 06/10/2023 100     CO2 06/10/2023 25     ANION GAP 06/10/2023 10     BUN 06/10/2023 17     Creatinine 06/10/2023 0.99     Glucose 06/10/2023 126     Calcium 06/10/2023 10.2     AST 06/10/2023 33     ALT 06/10/2023 51     Alkaline Phosphatase 06/10/2023 88     Total Protein 06/10/2023 8.1     Albumin 06/10/2023 4.5     Total Bilirubin 06/10/2023 0.73     eGFR 06/10/2023 106     WBC 06/10/2023 6.97     RBC 06/10/2023 5.27     Hemoglobin 06/10/2023 16.4     Hematocrit 06/10/2023 47.3     MCV 06/10/2023 90     MCH 06/10/2023 31.1     MCHC 06/10/2023 34.7     RDW 06/10/2023 12.1     MPV 06/10/2023 10.6     Platelets 06/10/2023 300     nRBC 06/10/2023 0     Segmented % 06/10/2023 60     Immature Grans % 06/10/2023 0     Lymphocytes % 06/10/2023 27     Monocytes % 06/10/2023 7     Eosinophils Relative 06/10/2023 5     Basophils Relative 06/10/2023 1     Absolute Neutrophils 06/10/2023 4.17     Absolute Immature Grans 06/10/2023 0.02     Absolute Lymphocytes 06/10/2023 1.91     Absolute Monocytes 06/10/2023 0.50     Eosinophils Absolute 06/10/2023 0.33     Basophils Absolute 06/10/2023 0.04     TSH 3RD GENERATON 06/10/2023 1.280     Cholesterol 06/10/2023 218 (H)     Triglycerides 06/10/2023 161 (H)     HDL, Direct 06/10/2023 44     LDL Calculated 06/10/2023 142 (H)     Non-HDL-Chol (CHOL-HDL) 06/10/2023 174      Folate 06/10/2023 10.7     Vitamin B-12 06/10/2023 228     Vit D, 25-Hydroxy 06/10/2023 22.2 (L)     Ventricular Rate 06/10/2023 95     Atrial Rate 06/10/2023 95     SD Interval 06/10/2023 172     QRSD Interval 06/10/2023 100     QT Interval 06/10/2023 350     QTC Interval 06/10/2023 439     P Axis 06/10/2023 68     QRS [place] 06/10/2023 93     T Wave Axis 06/10/2023 32      I have personally reviewed all pertinent laboratory/tests results.    Suicide/Homicide Risk Assessment:    Risk of Harm to Self:  Protective Factors: no current suicidal ideation, access to mental health treatment, compliant with medications, compliant with mental health treatment, connection to community, having a desire to be alive, having a sense of purpose or meaning in life, medical compliance, resiliency, stable job, strong relationships  Based on today's assessment, Corey presents the following risk of harm to self: minimal    Risk of Harm to Others:  Based on today's assessment, Corey presents the following risk of harm to others: minimal    The following interventions are recommended: referral for psychotherapy    Medications Risks/Benefits:      Risks, Benefits And Possible Side Effects Of Medications:    Discussed risks and benefits of treatment with patient including risk of suicidality, serotonin syndrome, increased QTc interval and SIADH related to treatment with antidepressants; Risk of induction of manic symptoms in certain patient populations and risk of parkinsonian symptoms, metabolic syndrome, tardive dyskinesia and neuroleptic malignant syndrome related to treatment with antipsychotic medications     Controlled Medication Discussion:     Not applicable    Treatment Plan:    Due for update/Updated:   no  Last treatment plan done 4/24, due 10/24/24    HOLGER Peace 06/04/24    Visit Time    Visit Start Time: 155  Visit Stop Time: 220  Total Visit Duration:  25 minutes

## 2024-06-04 NOTE — PROGRESS NOTES
[facility]        NAME: Corey Parker is a 24 y.o. male  : 2000    MRN: [id number]    Assessment and Plan   Viral URI with cough [J06.9]  1. Viral URI with cough  benzonatate (TESSALON) 200 MG capsule        Low suspicion for bacterial etiology of presenting symptoms.  Discussed symptomatic and supportive measures for management.  Patient has specks of blood in cough but no true hemoptysis, likely secondary to local irritation in setting of URI.  Hematemesis is baseline for patient and he is following with GI outpatient for this.    Patient Instructions     See wrap up for details  Proceed to  ER if symptoms worsen.    Chief Complaint     Chief Complaint   Patient presents with    Cough     Cough started Saturday night. Feel his lungs are heavy and states he is coughing up blood. Severe cold and cough.          History of Present Illness     HPI    P/w onset of symptoms 3-4 days ago  Reports nausea, emesis with mild blood, congestion, cough, sore throat, rhinorrhea   Denies fever, ear pain  Specks of blood in cough  Hematemesis is baseline, following with GI  No recent travel     Review of Systems   Review of Systems   Constitutional:  Negative for chills and fever.   HENT:  Positive for congestion, rhinorrhea and sore throat. Negative for ear pain.    Eyes:  Negative for pain and visual disturbance.   Respiratory:  Positive for cough. Negative for shortness of breath.    Cardiovascular:  Negative for chest pain and palpitations.   Gastrointestinal:  Positive for nausea and vomiting. Negative for abdominal pain.   Genitourinary:  Negative for dysuria and hematuria.   Musculoskeletal:  Negative for arthralgias and back pain.   Skin:  Negative for color change and rash.   Neurological:  Negative for seizures and syncope.   All other systems reviewed and are negative.    Current Medications       Current Outpatient Medications:     benzonatate (TESSALON) 200 MG capsule, Take 1 capsule (200 mg  total) by mouth 3 (three) times a day as needed for cough, Disp: 20 capsule, Rfl: 0    cariprazine (VRAYLAR) 3 MG capsule, Take 1 capsule (3 mg total) by mouth daily, Disp: 30 capsule, Rfl: 1    famotidine (PEPCID) 40 MG tablet, Take 1 tablet (40 mg total) by mouth daily at bedtime, Disp: 30 tablet, Rfl: 3    hydrOXYzine HCL (ATARAX) 50 mg tablet, Take 1 tablet (50 mg total) by mouth 2 (two) times a day as needed for itching, Disp: 90 tablet, Rfl: 0    ibuprofen (MOTRIN) 600 mg tablet, Take 1 tablet (600 mg total) by mouth every 6 (six) hours as needed for headaches, Disp: 30 tablet, Rfl: 2    meclizine (ANTIVERT) 25 mg tablet, Take 1 tablet (25 mg total) by mouth every 8 (eight) hours as needed for dizziness or nausea, Disp: 30 tablet, Rfl: 0    ondansetron (ZOFRAN) 4 mg tablet, Take 1 tablet (4 mg total) by mouth every 8 (eight) hours as needed for nausea or vomiting, Disp: 30 tablet, Rfl: 5    pantoprazole (PROTONIX) 40 mg tablet, Take 1 tablet (40 mg total) by mouth 2 (two) times a day, Disp: 60 tablet, Rfl: 3    benzonatate (TESSALON PERLES) 100 mg capsule, Take 1 capsule (100 mg total) by mouth 3 (three) times a day as needed for cough (Patient not taking: Reported on 6/4/2024), Disp: 30 capsule, Rfl: 0    cholecalciferol (VITAMIN D3) 1,000 units tablet, Take 1 tablet (1,000 Units total) by mouth daily Do not start before Ashley 15, 2023. (Patient not taking: Reported on 6/4/2024), Disp: 30 tablet, Rfl: 0    cyanocobalamin (VITAMIN B-12) 1000 MCG tablet, Take 1 tablet (1,000 mcg total) by mouth daily Do not start before Ashley 15, 2023. (Patient not taking: Reported on 6/4/2024), Disp: 30 tablet, Rfl: 0    sertraline (ZOLOFT) 100 mg tablet, Take 2 tablets (200 mg total) by mouth daily, Disp: 60 tablet, Rfl: 1    sodium chloride (OCEAN) 0.65 % nasal spray, 1 spray into each nostril every hour as needed for congestion (Patient not taking: Reported on 6/4/2024), Disp: 104 mL, Rfl: 0    traZODone (DESYREL) 100 mg  tablet, Take 1 tablet (100 mg total) by mouth daily at bedtime, Disp: 30 tablet, Rfl: 1    Current Allergies     Allergies as of 06/04/2024    (No Known Allergies)            The following portions of the patient's history were reviewed and updated as appropriate: allergies, current medications, past family history, past medical history, past social history, past surgical history and problem list.     Past Medical History:   Diagnosis Date    Anxiety     Depression     Schizoaffective disorder (HCC)        Past Surgical History:   Procedure Laterality Date    COLONOSCOPY         Family History   Problem Relation Age of Onset    Depression Mother     No Known Problems Father          Medications have been verified.        Objective   /80   Pulse (!) 113   Temp 97.9 °F (36.6 °C)   Resp 22   Wt 112 kg (247 lb)   SpO2 99%   BMI 34.45 kg/m²        Physical Exam     Physical Exam  Constitutional:       Appearance: Normal appearance.   HENT:      Head: Normocephalic and atraumatic.      Right Ear: A middle ear effusion is present.      Left Ear: A middle ear effusion is present.      Nose: Congestion present.      Mouth/Throat:      Mouth: Mucous membranes are moist.      Pharynx: Oropharynx is clear. Posterior oropharyngeal erythema present. No oropharyngeal exudate.      Tonsils: No tonsillar exudate or tonsillar abscesses.   Eyes:      General: No scleral icterus.     Extraocular Movements: Extraocular movements intact.   Cardiovascular:      Rate and Rhythm: Normal rate and regular rhythm.   Pulmonary:      Effort: Pulmonary effort is normal. No respiratory distress.      Breath sounds: Normal breath sounds.   Musculoskeletal:      Cervical back: Normal range of motion.   Lymphadenopathy:      Cervical: No cervical adenopathy.   Neurological:      General: No focal deficit present.      Mental Status: He is alert and oriented to person, place, and time.   Psychiatric:         Mood and Affect: Mood normal.          Behavior: Behavior normal.

## 2024-06-06 ENCOUNTER — TELEMEDICINE (OUTPATIENT)
Dept: PSYCHIATRY | Facility: CLINIC | Age: 24
End: 2024-06-06
Payer: COMMERCIAL

## 2024-06-06 DIAGNOSIS — F33.1 MDD (MAJOR DEPRESSIVE DISORDER), RECURRENT EPISODE, MODERATE (HCC): Primary | ICD-10-CM

## 2024-06-06 DIAGNOSIS — F43.10 PTSD (POST-TRAUMATIC STRESS DISORDER): ICD-10-CM

## 2024-06-06 DIAGNOSIS — F41.1 GENERALIZED ANXIETY DISORDER: ICD-10-CM

## 2024-06-06 PROCEDURE — 90834 PSYTX W PT 45 MINUTES: CPT

## 2024-06-06 NOTE — BH CRISIS PLAN
Client Name: Corey Parker       Client YOB: 2000    ArthurRogelio Safety Plan      Creation Date: 2/8/24 Update Date: 6/2/25    Last Updated By: Chela Taylor      Step 1: Warning Signs:   Warning Signs   I get very overwhelmed, irritible   Increased fidgeting, foot tapping   Spending money            Step 2: Internal Coping Strategies:   Internal Coping Strategies   I play video games, talk to a friend            Step 3: People and social settings that provide distraction:   Name Contact Information   My mother Arely In phone and in home   Matthias In phone    Places   BAck yard, playing with dogs   bedroom in chair, browsing phone or internet           Step 4: People whom I can ask for help during a crisis:      Name Contact Information    Mom In phone and lives with      Step 5: Professionals or agencies I can contact during a crisis:      Clinican/Agency Name Phone Emergency Contact     psychiatry      Chela Taylor [facility]        Local Emergency Department Emergency Department Phone Emergency [place] [address]        Crisis Phone Numbers:   Suicide Prevention Lifeline: Call or Text  350 Crisis Text Line: Text HOME to 890-865   Please note: Some [facility] do not have a separate number for Child/Adolescent specific crisis. If your county is not listed under Child/Adolescent, please call the adult number for your county      Adult Crisis Numbers: Child/Adolescent Crisis Numbers   [facility]: [phone] [facility]: [phone]   [facility]: [phone] [facility]: [phone]   [facility]: [phone] [place]: [phone]   [facility]: [phone] [place]: [phone]   [facility]: [phone]   [facility]: [phone]   [facility]: [phone]   [facility]: [phone] (daytime) [phone] (after  hours, weekends, holidays)      Step 6: Making the environment safer (plan for lethal means safety):   Patient did not identify any lethal methods: Yes     Optional: What is most important to me and worth living for?      [place] Safety Plan. Elyssa Gibson and Luis F Mercado. Used with permission of the authors.
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
## 2024-06-19 ENCOUNTER — TELEMEDICINE (OUTPATIENT)
Dept: PSYCHIATRY | Facility: CLINIC | Age: 24
End: 2024-06-19
Payer: COMMERCIAL

## 2024-06-19 DIAGNOSIS — F43.10 PTSD (POST-TRAUMATIC STRESS DISORDER): ICD-10-CM

## 2024-06-19 DIAGNOSIS — F41.1 GENERALIZED ANXIETY DISORDER: ICD-10-CM

## 2024-06-19 DIAGNOSIS — F33.1 MDD (MAJOR DEPRESSIVE DISORDER), RECURRENT EPISODE, MODERATE (HCC): Primary | ICD-10-CM

## 2024-06-19 PROCEDURE — 90834 PSYTX W PT 45 MINUTES: CPT

## 2024-06-19 NOTE — PSYCH
Virtual Regular Visit    Verification of patient location:    Patient is located at Home in the following state in which I hold an active license [place]      Assessment/Plan:    Problem List Items Addressed This Visit       Generalized anxiety disorder    PTSD (post-traumatic stress disorder)    MDD (major depressive disorder), recurrent episode, moderate (HCC) - Primary        Reason for visit is   Chief Complaint   Patient presents with    Virtual Regular Visit          Encounter provider Chela Taylor      Recent Visits  No visits were found meeting these conditions.  Showing recent visits within past 7 days and meeting all other requirements  Today's Visits  Date Type Provider Dept   06/19/24 Telemedicine Chela Taylor [facility]   Showing today's visits and meeting all other requirements  Future Appointments  No visits were found meeting these conditions.  Showing future appointments within next 150 days and meeting all other requirements       The patient was identified by name and date of birth. Corey Parker was informed that this is a telemedicine visit and that the visit is being conducted throughthe [employer] platform. He agrees to proceed..  My office door was closed. No one else was in the room.  He acknowledged consent and understanding of privacy and security of the video platform. The patient has agreed to participate and understands they can discontinue the visit at any time.    Patient is aware this is a billable service.   Past Medical History:   Diagnosis Date    Anxiety     Depression     Schizoaffective disorder (HCC)        Past Surgical History:   Procedure Laterality Date    COLONOSCOPY         Current Outpatient Medications   Medication Sig Dispense Refill    benzonatate (TESSALON PERLES) 100 mg capsule Take 1 capsule (100 mg total) by mouth 3 (three) times a day as needed for cough (Patient not taking: Reported on 6/4/2024) 30 capsule 0     benzonatate (TESSALON) 200 MG capsule Take 1 capsule (200 mg total) by mouth 3 (three) times a day as needed for cough 20 capsule 0    cariprazine (VRAYLAR) 3 MG capsule Take 1 capsule (3 mg total) by mouth daily 30 capsule 2    cholecalciferol (VITAMIN D3) 1,000 units tablet Take 1 tablet (1,000 Units total) by mouth daily Do not start before Ashley 15, 2023. (Patient not taking: Reported on 6/4/2024) 30 tablet 0    cyanocobalamin (VITAMIN B-12) 1000 MCG tablet Take 1 tablet (1,000 mcg total) by mouth daily Do not start before Ashley 15, 2023. (Patient not taking: Reported on 6/4/2024) 30 tablet 0    famotidine (PEPCID) 40 MG tablet Take 1 tablet (40 mg total) by mouth daily at bedtime 30 tablet 3    hydrOXYzine HCL (ATARAX) 50 mg tablet Take 1 tablet (50 mg total) by mouth 2 (two) times a day as needed for itching 60 tablet 2    ibuprofen (MOTRIN) 600 mg tablet Take 1 tablet (600 mg total) by mouth every 6 (six) hours as needed for headaches 30 tablet 2    meclizine (ANTIVERT) 25 mg tablet Take 1 tablet (25 mg total) by mouth every 8 (eight) hours as needed for dizziness or nausea 30 tablet 0    ondansetron (ZOFRAN) 4 mg tablet Take 1 tablet (4 mg total) by mouth every 8 (eight) hours as needed for nausea or vomiting 30 tablet 5    pantoprazole (PROTONIX) 40 mg tablet Take 1 tablet (40 mg total) by mouth 2 (two) times a day 60 tablet 3    sertraline (ZOLOFT) 100 mg tablet Take 2 tablets (200 mg total) by mouth daily 60 tablet 2    sodium chloride (OCEAN) 0.65 % nasal spray 1 spray into each nostril every hour as needed for congestion (Patient not taking: Reported on 6/4/2024) 104 mL 0    traZODone (DESYREL) 100 mg tablet Take 1 tablet (100 mg total) by mouth daily at bedtime 30 tablet 2     No current facility-administered medications for this visit.   Behavioral Health Psychotherapy Progress Note    Psychotherapy Provided: Individual Psychotherapy     1. MDD (major depressive disorder), recurrent episode, moderate  "([facility])        2. PTSD (post-traumatic stress disorder)        3. Generalized anxiety disorder            Goals addressed in session: Goal 1     DATA: Corey Parker is a 24 y.o. male presenting to this virtual session for the supportive treatment of depression, anxeity and ptsd.  He did not get job he interviewed for last week and has 2 new interviews.  He had one panic attack since last sx due to anxiety related to finances.  He had been going thru [employer] looking for employment and could feel anxiety thru the roof, asking self \"what if I don't get this?\" He laid down and passed out.  He denies s/I/p.  He recognizes that panic attack starts with feeling very tall, \"like looking over a bridge, being on top of a building\" and he also gets very hot - feels like he is in an oven.  He takes \"rescue\"  medication that works 50% of time.  He feels anxiety most of the time and notes know consistent external cause of same.  He has learned to change his environment in order to decrease anxiety (at restaurant, go to restroom).  He avoids providing direct answers re anxious thoughts.  He notes discomfort talking about anything more in depth as he has not been working with this therapist for very long.      During this session, this clinician used the following therapeutic modalities: Cognitive Behavioral Therapy, Mindfulness-based Strategies, and Supportive Psychotherapy    Substance Abuse was not addressed during this session. If the client is diagnosed with a co-occurring substance use disorder, please indicate any changes in the frequency or amount of use: NA. Stage of change for addressing substance use diagnoses: No substance use/Not applicable    ASSESSMENT:  Corey Parker presents with a Anxious mood.     his affect is Normal range and intensity, which is congruent, with his mood and the content of the session. The client has not made progress on their goals.    Corey Parker presents with a low risk of suicide, low " "risk of self-harm, and low risk of harm to others.    For any risk assessment that surpasses a \"low\" rating, a safety plan must be developed.    A safety plan was indicated: no  If yes, describe in detail NA    PLAN: Between sessions, Corey Parker will review info sent re cog dist and begin thought log. At the next session, the therapist will use Engagement Strategies, Cognitive Behavioral Therapy, Mindfulness-based Strategies, and Supportive Psychotherapy to address anxiety, ptsd and depression    Behavioral Health Treatment Plan and Discharge Planning: Corey Parker is aware of and agrees to continue to work on their treatment plan. They have identified and are working toward their discharge goals. yes    Visit start and stop times:    06/19/24  Start Time: 1515  Stop Time: 1558  Total Visit Time: 43 minutes        "

## 2024-06-24 ENCOUNTER — OFFICE VISIT (OUTPATIENT)
Dept: FAMILY MEDICINE CLINIC | Facility: CLINIC | Age: 24
End: 2024-06-24
Payer: COMMERCIAL

## 2024-06-24 VITALS
HEART RATE: 117 BPM | WEIGHT: 249 LBS | RESPIRATION RATE: 16 BRPM | SYSTOLIC BLOOD PRESSURE: 110 MMHG | DIASTOLIC BLOOD PRESSURE: 70 MMHG | OXYGEN SATURATION: 97 % | HEIGHT: 71 IN | TEMPERATURE: 97.4 F | BODY MASS INDEX: 34.86 KG/M2

## 2024-06-24 DIAGNOSIS — Z00.00 WELL ADULT EXAM: Primary | ICD-10-CM

## 2024-06-24 PROCEDURE — 99395 PREV VISIT EST AGE 18-39: CPT | Performed by: PHYSICIAN ASSISTANT

## 2024-06-24 NOTE — PROGRESS NOTES
Adult Annual Physical  Name: Corey Parker      : 2000      MRN: [id number]  Encounter Provider: Agueda Rosales PA-C  Encounter Date: 2024   Encounter department: [facility]    Assessment & Plan   1. Well adult exam  2. Annual physical exam    Immunizations and preventive care screenings were discussed with patient today. Appropriate education was printed on patient's after visit summary.    Counseling:  Exercise: the importance of regular exercise/physical activity was discussed. Recommend exercise 3-5 times per week for at least 30 minutes.       Depression Screening and Follow-up Plan: Patient was screened for depression during today's encounter. They screened negative with a PHQ-9 score of 0.    Tobacco Cessation Counseling: Tobacco cessation counseling was provided. The patient is sincerely urged to quit consumption of tobacco. He is not ready to quit tobacco.         History of Present Illness     Adult Annual Physical:  Patient presents for annual physical.     Depression Screening:    - PHQ-9 Score: 0    General Health:    - Hearing: normal hearing right ear and normal hearing left ear.  - Vision: no vision problems.    Review of Systems   Constitutional:  Negative for chills and fever.   HENT:  Negative for ear pain and sore throat.    Eyes:  Negative for pain and visual disturbance.   Respiratory:  Negative for cough and shortness of breath.    Cardiovascular:  Negative for chest pain and palpitations.   Gastrointestinal:  Negative for abdominal pain and vomiting.   Genitourinary:  Negative for dysuria and hematuria.   Musculoskeletal:  Negative for arthralgias and back pain.   Skin:  Negative for color change and rash.   Neurological:  Negative for seizures and syncope.   All other systems reviewed and are negative.        Objective     /70 (BP Location: Left arm, Patient Position: Sitting, Cuff Size: Adult)   Pulse (!) 117   Temp (!) 97.4 °F (36.3 °C) (Temporal)   Resp  "16   Ht 5' 11\" (1.803 m)   Wt 113 kg (249 lb)   SpO2 97%   BMI 34.73 kg/m²     Physical Exam  Vitals reviewed.   Constitutional:       General: He is not in acute distress.     Appearance: He is well-developed. He is not diaphoretic.   HENT:      Head: Normocephalic and atraumatic.      Right Ear: Hearing, tympanic membrane, ear canal and external ear normal.      Left Ear: Hearing, tympanic membrane, ear canal and external ear normal.      Nose: Nose normal.      Mouth/Throat:      Mouth: Mucous membranes are moist.      Pharynx: Oropharynx is clear. Uvula midline. No oropharyngeal exudate.   Eyes:      General: No scleral icterus.        Right eye: No discharge.         Left eye: No discharge.      Conjunctiva/sclera: Conjunctivae normal.   Neck:      Thyroid: No thyromegaly.      Vascular: No carotid bruit.   Cardiovascular:      Rate and Rhythm: Normal rate and regular rhythm.      Heart sounds: Normal heart sounds. No murmur heard.  Pulmonary:      Effort: Pulmonary effort is normal. No respiratory distress.      Breath sounds: Normal breath sounds. No wheezing.   Abdominal:      General: Bowel sounds are normal. There is no distension.      Palpations: Abdomen is soft. There is no mass.      Tenderness: There is no abdominal tenderness. There is no guarding or rebound.   Musculoskeletal:         General: No tenderness. Normal range of motion.      Cervical back: Neck supple.   Lymphadenopathy:      Cervical: No cervical adenopathy.   Skin:     General: Skin is warm and dry.      Findings: No erythema or rash.   Neurological:      Mental Status: He is alert and oriented to person, place, and time.   Psychiatric:         Behavior: Behavior normal.         Thought Content: Thought content normal.         Judgment: Judgment normal.       Administrative Statements         "

## 2024-06-26 ENCOUNTER — TELEPHONE (OUTPATIENT)
Dept: FAMILY MEDICINE CLINIC | Facility: CLINIC | Age: 24
End: 2024-06-26

## 2024-06-26 NOTE — TELEPHONE ENCOUNTER
Called pt and left a voice mail that paper work is at the    He needs to come in and sign paper work before leaving and then the girls need to make a copy of paper work before picking up

## 2024-07-22 ENCOUNTER — TELEMEDICINE (OUTPATIENT)
Dept: PSYCHIATRY | Facility: CLINIC | Age: 24
End: 2024-07-22
Payer: COMMERCIAL

## 2024-07-22 DIAGNOSIS — F43.10 PTSD (POST-TRAUMATIC STRESS DISORDER): ICD-10-CM

## 2024-07-22 DIAGNOSIS — F41.1 GENERALIZED ANXIETY DISORDER: ICD-10-CM

## 2024-07-22 DIAGNOSIS — F33.41 MDD (MAJOR DEPRESSIVE DISORDER), RECURRENT, IN PARTIAL REMISSION (HCC): Primary | ICD-10-CM

## 2024-07-22 PROCEDURE — 99214 OFFICE O/P EST MOD 30 MIN: CPT

## 2024-07-22 NOTE — PSYCH
Virtual Regular Visit    Verification of patient location:    Patient is located in the following state in which I hold an active license [place]    Problem List Items Addressed This Visit          Behavioral Health    Generalized anxiety disorder    PTSD (post-traumatic stress disorder)    MDD (major depressive disorder), recurrent episode, moderate (HCC) - Primary          Encounter provider HOLGER Peace    Provider located at    [facility]  [address] [facility] [address] [phone]    Recent Visits  Date Type Provider Dept   07/22/24 Telemedicine HOLGER Peace  [facility]   Showing recent visits within past 7 days and meeting all other requirements  Future Appointments  No visits were found meeting these conditions.  Showing future appointments within next 150 days and meeting all other requirements         The patient was identified by name and date of birth. Corey Parker was informed that this is a telemedicine visit and that the visit is being conducted throughthe Epic Embedded platform. He agrees to proceed..  My office door was closed. No one else was in the room.  He acknowledged consent and understanding of privacy and security of the video platform. The patient has agreed to participate and understands they can discontinue the visit at any time.    Patient is aware this is a billable service.     HPI     Current Outpatient Medications   Medication Sig Dispense Refill    cariprazine (VRAYLAR) 3 MG capsule Take 1 capsule (3 mg total) by mouth daily 30 capsule 2    famotidine (PEPCID) 40 MG tablet Take 1 tablet (40 mg total) by mouth daily at bedtime 30 tablet 3    hydrOXYzine HCL (ATARAX) 50 mg tablet Take 1 tablet (50 mg total) by mouth 2 (two) times a day as needed for itching 60 tablet 2    ondansetron (ZOFRAN) 4 mg tablet Take 1 tablet (4 mg total) by mouth every 8 (eight) hours as needed for  nausea or vomiting 30 tablet 5    pantoprazole (PROTONIX) 40 mg tablet Take 1 tablet (40 mg total) by mouth 2 (two) times a day 60 tablet 3    sertraline (ZOLOFT) 100 mg tablet Take 2 tablets (200 mg total) by mouth daily 60 tablet 2    traZODone (DESYREL) 100 mg tablet Take 1 tablet (100 mg total) by mouth daily at bedtime 30 tablet 2    benzonatate (TESSALON) 200 MG capsule Take 1 capsule (200 mg total) by mouth 3 (three) times a day as needed for cough (Patient not taking: Reported on 6/24/2024) 20 capsule 0    meclizine (ANTIVERT) 25 mg tablet Take 1 tablet (25 mg total) by mouth every 8 (eight) hours as needed for dizziness or nausea (Patient not taking: Reported on 6/24/2024) 30 tablet 0     No current facility-administered medications for this visit.       Review of Systems  Video Exam    There were no vitals filed for this visit.    Physical Exam   As a result of this visit, I have referred the patient for further respiratory evaluation. No      VIRTUAL VISIT DISCLAIMER    Corey Parker acknowledges that he has consented to an online visit or consultation. He understands that the online visit is based solely on information provided by him, and that, in the absence of a face-to-face physical evaluation by the physician, the diagnosis he receives is both limited and provisional in terms of accuracy and completeness. This is not intended to replace a full medical face-to-face evaluation by the physician. Corey Parker understands and accepts these terms.    MEDICATION MANAGEMENT NOTE        [facility]    Name and Date of Birth:  Corey Parker 24 y.o. 2000 MRN: [id number]    Date of Visit: July 23, 2024    No Known Allergies  SUBJECTIVE:    Corey is seen today for a follow up for Major Depressive Disorder and anxiety. He reports that he continues to do relatively well since the last visit. Corey was happy to report he acquired a new job working at  [employer] and it is going very well to date, reports an improved work environment and pay compared to his previous job.  States his mood has improved since starting new job and having a paycheck every other week which was his biggest stressor over the past few months.  Previously stated he cannot leave the house due to having no money and now the situation is slowly improving.  Reports being in a new romantic relationship which is also improving his mood.  Reports 3/10 and controlled depression with no debilitating symptoms at this time.  Mood is pleasant, euthymic, brighter than previous appointments.  No evidence of aron.  He reports adequate sleep and appetite.  Continues to report compliance of Zoloft, Vraylar, trazodone combination and denying adverse effects.  No medication changes required today.  Denies SI and HI.      He denies any side effects from medications.    PLAN:      -Continue Zoloft to 200 mg daily for depressive and anxiety symptoms   PARQ completed including serotonin syndrome, SIADH, worsening depression, suicidality, induction of aron, GI upset, headaches, activation, sexual side effects, sedation, potential drug interactions, and others.     -Continue Vraylar 3mg for treatment resistant depression and augmentation of Zoloft   Patient education completed including dizziness, sedation, GI distress, Akathisia, agitation, orthostatic hypotension, headache, and cardiovascular risks, metabolic syndrome, NMS, TD, EPS, Seizures, and others      -Continue prn Atarax 50mg daily for breakthrough anxiety     -Continue trazodone 100 mg at bedtime for insomnia.         Aware of 24 hour and weekend coverage for urgent situations accessed by calling [facility] main practice number  Continue psychotherapy with therapist    Diagnoses and all orders for this visit:    MDD (major depressive disorder), recurrent, in partial remission (HCC)    PTSD (post-traumatic stress  disorder)    Generalized anxiety disorder        Current Outpatient Medications on File Prior to Visit   Medication Sig Dispense Refill    cariprazine (VRAYLAR) 3 MG capsule Take 1 capsule (3 mg total) by mouth daily 30 capsule 2    famotidine (PEPCID) 40 MG tablet Take 1 tablet (40 mg total) by mouth daily at bedtime 30 tablet 3    hydrOXYzine HCL (ATARAX) 50 mg tablet Take 1 tablet (50 mg total) by mouth 2 (two) times a day as needed for itching 60 tablet 2    ondansetron (ZOFRAN) 4 mg tablet Take 1 tablet (4 mg total) by mouth every 8 (eight) hours as needed for nausea or vomiting 30 tablet 5    pantoprazole (PROTONIX) 40 mg tablet Take 1 tablet (40 mg total) by mouth 2 (two) times a day 60 tablet 3    sertraline (ZOLOFT) 100 mg tablet Take 2 tablets (200 mg total) by mouth daily 60 tablet 2    traZODone (DESYREL) 100 mg tablet Take 1 tablet (100 mg total) by mouth daily at bedtime 30 tablet 2    benzonatate (TESSALON) 200 MG capsule Take 1 capsule (200 mg total) by mouth 3 (three) times a day as needed for cough (Patient not taking: Reported on 6/24/2024) 20 capsule 0    meclizine (ANTIVERT) 25 mg tablet Take 1 tablet (25 mg total) by mouth every 8 (eight) hours as needed for dizziness or nausea (Patient not taking: Reported on 6/24/2024) 30 tablet 0     No current facility-administered medications on file prior to visit.       Psychotherapy Provided:     Individual psychotherapy provided: Counseling was provided during the session today for 5 minutes.  Medication changes discussed with Corey.  Medication education provided to Corey.     HPI ROS Appetite Changes and Sleep:     He reports difficulty falling asleep, adequate appetite, adequate energy level. Denies homicidal ideation, denies suicidal ideation    Review Of Systems:      HPI ROS:               Medication Side Effects:  denies    Depression (10 worst): 3/10    Anxiety (10 worst): 2/10    Safety concerns (SI, HI, etc): Denies si and hi    Sleep: 7  hrs    Energy: fair    Appetite: 2-3 meals    Weight Change: denies        Mental Status Evaluation:    Appearance Adequate hygiene and grooming   Behavior calm and cooperative   Mood improving  Depression Scale - 3 of 10 (0 = No depression)  Anxiety Scale - 2 of 10 (0 = No anxiety)   Speech Normal rate and volume   Affect mood-congruent   Thought Processes Goal directed and coherent   Thought Content Does not verbalize delusional material   Associations Tightly connected   Perceptual Disturbances Denies hallucinations and does not appear to be responding to internal stimuli   Risk Potential Suicidal/Homicidal Ideation - No evidence of suicidal or homicidal ideation and patient does not verbalize suicidal or homicidal ideation  Risk of Violence - No evidence of risk for violence found on assessment  Risk of Self Mutilation - No evidence of risk for self mutilation found on assessment   Orientation oriented to person, place, time/date, and situation   Memory recent and remote memory grossly intact   Consciousness alert and awake   Attention/Concentration attention span and concentration are age appropriate   Insight intact   Judgement intact   Muscle Strength and Gait normal muscle strength and normal muscle tone, normal gait/station and normal balance   Motor Activity no abnormal movements   Language no difficulty naming common objects, no difficulty repeating a phrase, no difficulty writing a sentence   Fund of Knowledge adequate knowledge of current events  adequate fund of knowledge regarding past history  adequate fund of knowledge regarding vocabulary          Past Medical History:    Past Medical History:   Diagnosis Date    Anxiety     Depression     Schizoaffective disorder (HCC)         Past Surgical History:   Procedure Laterality Date    COLONOSCOPY       No Known Allergies  Substance Abuse History:    Social History     Substance and Sexual Activity   Alcohol Use Not Currently    Alcohol/week: 2.0  standard drinks of alcohol    Types: 2 Shots of liquor per week     Social History     Substance and Sexual Activity   Drug Use Yes    Types: Marijuana     Social History:    Social History     Socioeconomic History    Marital status: Single     Spouse name: Not on file    Number of children: Not on file    Years of education: Not on file    Highest education level: Not on file   Occupational History    Not on file   Tobacco Use    Smoking status: Some Days     Types: Cigarettes    Smokeless tobacco: Never    Tobacco comments:     once monthy   Vaping Use    Vaping status: Former    Substances: CBD   Substance and Sexual Activity    Alcohol use: Not Currently     Alcohol/week: 2.0 standard drinks of alcohol     Types: 2 Shots of liquor per week    Drug use: Yes     Types: Marijuana    Sexual activity: Yes     Partners: Female   Other Topics Concern    Not on file   Social History Narrative    Not on file     Social Determinants of Health     Financial Resource Strain: Not on file   Food Insecurity: Not on file   Transportation Needs: Not on file   Physical Activity: Not on file   Stress: Not on file   Social Connections: Unknown (6/18/2024)    Received from [facility]     How often do you feel lonely or isolated from those around you? (Adult - for ages 18 years and over): Not on file   Intimate Partner Violence: Not on file   Housing Stability: Not on file     Family Psychiatric History:     Family History   Problem Relation Age of Onset    Depression Mother     No Known Problems Father      History Review:The following portions of the patient's history were reviewed and updated as appropriate: allergies, current medications, past family history, past medical history, past social history, past surgical history, and problem list     OBJECTIVE:     Vital signs in last 24 hours:    There were no vitals filed for this visit.  Laboratory Results: Recent Labs (last 2 months):   No visits with results  within 2 Month(s) from this visit.   Latest known visit with results is:   Admission on 06/09/2023, Discharged on 06/14/2023   Component Date Value    Sodium 06/10/2023 135     Potassium 06/10/2023 3.7     Chloride 06/10/2023 100     CO2 06/10/2023 25     ANION GAP 06/10/2023 10     BUN 06/10/2023 17     Creatinine 06/10/2023 0.99     Glucose 06/10/2023 126     Calcium 06/10/2023 10.2     AST 06/10/2023 33     ALT 06/10/2023 51     Alkaline Phosphatase 06/10/2023 88     Total Protein 06/10/2023 8.1     Albumin 06/10/2023 4.5     Total Bilirubin 06/10/2023 0.73     eGFR 06/10/2023 106     WBC 06/10/2023 6.97     RBC 06/10/2023 5.27     Hemoglobin 06/10/2023 16.4     Hematocrit 06/10/2023 47.3     MCV 06/10/2023 90     MCH 06/10/2023 31.1     MCHC 06/10/2023 34.7     RDW 06/10/2023 12.1     MPV 06/10/2023 10.6     Platelets 06/10/2023 300     nRBC 06/10/2023 0     Segmented % 06/10/2023 60     Immature Grans % 06/10/2023 0     Lymphocytes % 06/10/2023 27     Monocytes % 06/10/2023 7     Eosinophils Relative 06/10/2023 5     Basophils Relative 06/10/2023 1     Absolute Neutrophils 06/10/2023 4.17     Absolute Immature Grans 06/10/2023 0.02     Absolute Lymphocytes 06/10/2023 1.91     Absolute Monocytes 06/10/2023 0.50     Eosinophils Absolute 06/10/2023 0.33     Basophils Absolute 06/10/2023 0.04     TSH 3RD GENERATON 06/10/2023 1.280     Cholesterol 06/10/2023 218 (H)     Triglycerides 06/10/2023 161 (H)     HDL, Direct 06/10/2023 44     LDL Calculated 06/10/2023 142 (H)     Non-HDL-Chol (CHOL-HDL) 06/10/2023 174     Folate 06/10/2023 10.7     Vitamin B-12 06/10/2023 228     Vit D, 25-Hydroxy 06/10/2023 22.2 (L)     Ventricular Rate 06/10/2023 95     Atrial Rate 06/10/2023 95     DE Interval 06/10/2023 172     QRSD Interval 06/10/2023 100     QT Interval 06/10/2023 350     QTC Interval 06/10/2023 439     P Axis 06/10/2023 68     QRS [place] 06/10/2023 93     T Wave Axis 06/10/2023 32      I have personally reviewed all  pertinent laboratory/tests results.    Suicide/Homicide Risk Assessment:    Risk of Harm to Self:  Protective Factors: no current suicidal ideation, access to mental health treatment, compliant with medications, compliant with mental health treatment, connection to community, having a desire to be alive, resiliency  Based on today's assessment, Corey presents the following risk of harm to self: minimal    Risk of Harm to Others:  Based on today's assessment, Corey presents the following risk of harm to others: minimal    The following interventions are recommended: referral for psychotherapy    Medications Risks/Benefits:      Risks, Benefits And Possible Side Effects Of Medications:    Discussed risks and benefits of treatment with patient including risk of suicidality, serotonin syndrome, increased QTc interval and SIADH related to treatment with antidepressants; Risk of induction of manic symptoms in certain patient populations and risk of parkinsonian symptoms, metabolic syndrome, tardive dyskinesia and neuroleptic malignant syndrome related to treatment with antipsychotic medications     Controlled Medication Discussion:     Not applicable    Treatment Plan:    Due for update/Updated:   no  Last treatment plan done 5/22 .  Treatment Plan due on 11/22/24.    HOLGER Peace 07/23/24    This note was shared with patient.      Visit Time    Visit Start Time: 530  Visit Stop Time: 552  Total Visit Duration:  22 minutes

## 2024-07-23 DIAGNOSIS — K21.9 GASTROESOPHAGEAL REFLUX DISEASE WITHOUT ESOPHAGITIS: ICD-10-CM

## 2024-07-23 NOTE — TELEPHONE ENCOUNTER
Reason for call:   [x] Refill   [] Prior Auth  [] Other:     Office:   [x] PCP/Provider -  Mandy Cheema PA-C   [] Specialty/Provider -     Medication: pantoprazole (PROTONIX) 40 mg tablet     Dose/Frequency: Take 1 tablet (40 mg total) by mouth 2 (two) times a day     Quantity: 60    Pharmacy:  [facility] - [address]     Does the patient have enough for 3 days?   [] Yes   [x] No - Send as HP to POD

## 2024-07-24 RX ORDER — PANTOPRAZOLE SODIUM 40 MG/1
40 TABLET, DELAYED RELEASE ORAL 2 TIMES DAILY
Qty: 90 TABLET | Refills: 3 | Status: SHIPPED | OUTPATIENT
Start: 2024-07-24

## 2024-08-06 ENCOUNTER — TELEMEDICINE (OUTPATIENT)
Dept: PSYCHIATRY | Facility: CLINIC | Age: 24
End: 2024-08-06
Payer: COMMERCIAL

## 2024-08-06 DIAGNOSIS — F41.1 GENERALIZED ANXIETY DISORDER: ICD-10-CM

## 2024-08-06 DIAGNOSIS — F43.10 PTSD (POST-TRAUMATIC STRESS DISORDER): ICD-10-CM

## 2024-08-06 DIAGNOSIS — F33.41 MDD (MAJOR DEPRESSIVE DISORDER), RECURRENT, IN PARTIAL REMISSION (HCC): Primary | ICD-10-CM

## 2024-08-06 PROCEDURE — 90834 PSYTX W PT 45 MINUTES: CPT
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
## 2024-08-20 ENCOUNTER — TELEMEDICINE (OUTPATIENT)
Dept: PSYCHIATRY | Facility: CLINIC | Age: 24
End: 2024-08-20
Payer: COMMERCIAL

## 2024-08-20 DIAGNOSIS — F43.10 PTSD (POST-TRAUMATIC STRESS DISORDER): ICD-10-CM

## 2024-08-20 DIAGNOSIS — F41.1 GENERALIZED ANXIETY DISORDER: ICD-10-CM

## 2024-08-20 DIAGNOSIS — F33.41 MDD (MAJOR DEPRESSIVE DISORDER), RECURRENT, IN PARTIAL REMISSION (HCC): Primary | ICD-10-CM

## 2024-08-20 PROCEDURE — 90834 PSYTX W PT 45 MINUTES: CPT
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
## 2024-09-11 ENCOUNTER — TELEPHONE (OUTPATIENT)
Dept: PSYCHIATRY | Facility: CLINIC | Age: 24
End: 2024-09-11

## 2024-09-27 ENCOUNTER — TELEMEDICINE (OUTPATIENT)
Dept: PSYCHIATRY | Facility: CLINIC | Age: 24
End: 2024-09-27
Payer: COMMERCIAL

## 2024-09-27 DIAGNOSIS — F33.41 MDD (MAJOR DEPRESSIVE DISORDER), RECURRENT, IN PARTIAL REMISSION (HCC): Primary | ICD-10-CM

## 2024-09-27 DIAGNOSIS — F43.10 PTSD (POST-TRAUMATIC STRESS DISORDER): ICD-10-CM

## 2024-09-27 DIAGNOSIS — F41.1 GENERALIZED ANXIETY DISORDER: ICD-10-CM

## 2024-09-27 PROCEDURE — 99214 OFFICE O/P EST MOD 30 MIN: CPT

## 2024-09-27 RX ORDER — SERTRALINE HYDROCHLORIDE 100 MG/1
200 TABLET, FILM COATED ORAL DAILY
Qty: 60 TABLET | Refills: 2 | Status: SHIPPED | OUTPATIENT
Start: 2024-09-27 | End: 2024-12-26

## 2024-09-27 RX ORDER — HYDROXYZINE HYDROCHLORIDE 50 MG/1
50 TABLET, FILM COATED ORAL 2 TIMES DAILY PRN
Qty: 60 TABLET | Refills: 2 | Status: SHIPPED | OUTPATIENT
Start: 2024-09-27

## 2024-09-27 RX ORDER — TRAZODONE HYDROCHLORIDE 100 MG/1
100 TABLET ORAL
Qty: 30 TABLET | Refills: 2 | Status: SHIPPED | OUTPATIENT
Start: 2024-09-27 | End: 2024-12-26

## 2024-09-27 NOTE — PSYCH
Virtual Regular Visit    Verification of patient location:    Patient is located in the following state in which I hold an active license [place]    Assessment & Plan  MDD (major depressive disorder), recurrent, in partial remission (HCC)  Continue Vraylar and Zoloft combination  Generalized anxiety disorder  Continue as needed Atarax for breakthrough anxiety symptoms               Encounter provider HOLGER Peace    Provider located at    [facility]  [address] [facility] [address] [phone]    Recent Visits  No visits were found meeting these conditions.  Showing recent visits within past 7 days and meeting all other requirements  Today's Visits  Date Type Provider Dept   09/27/24 Telemedicine HOLGER Peace  [facility]   Showing today's visits and meeting all other requirements  Future Appointments  No visits were found meeting these conditions.  Showing future appointments within next 150 days and meeting all other requirements         The patient was identified by name and date of birth. Corey Parker was informed that this is a telemedicine visit and that the visit is being conducted throughthe Epic Embedded platform. He agrees to proceed..  My office door was closed. No one else was in the room.  He acknowledged consent and understanding of privacy and security of the video platform. The patient has agreed to participate and understands they can discontinue the visit at any time.    Patient is aware this is a billable service.     HPI     Current Outpatient Medications   Medication Sig Dispense Refill    benzonatate (TESSALON) 200 MG capsule Take 1 capsule (200 mg total) by mouth 3 (three) times a day as needed for cough (Patient not taking: Reported on 6/24/2024) 20 capsule 0    cariprazine (VRAYLAR) 3 MG capsule Take 1 capsule (3 mg total) by mouth daily 30 capsule 2    famotidine (PEPCID) 40 MG tablet  Take 1 tablet (40 mg total) by mouth daily at bedtime 30 tablet 3    hydrOXYzine HCL (ATARAX) 50 mg tablet Take 1 tablet (50 mg total) by mouth 2 (two) times a day as needed for itching 60 tablet 2    meclizine (ANTIVERT) 25 mg tablet Take 1 tablet (25 mg total) by mouth every 8 (eight) hours as needed for dizziness or nausea (Patient not taking: Reported on 6/24/2024) 30 tablet 0    ondansetron (ZOFRAN) 4 mg tablet Take 1 tablet (4 mg total) by mouth every 8 (eight) hours as needed for nausea or vomiting 30 tablet 5    pantoprazole (PROTONIX) 40 mg tablet Take 1 tablet (40 mg total) by mouth 2 (two) times a day 90 tablet 3    sertraline (ZOLOFT) 100 mg tablet Take 2 tablets (200 mg total) by mouth daily 60 tablet 2    traZODone (DESYREL) 100 mg tablet Take 1 tablet (100 mg total) by mouth daily at bedtime 30 tablet 2     No current facility-administered medications for this visit.       Review of Systems  Video Exam    There were no vitals filed for this visit.    Physical Exam   As a result of this visit, I have referred the patient for further respiratory evaluation. No      VIRTUAL VISIT DISCLAIMER    Corey Parker acknowledges that he has consented to an online visit or consultation. He understands that the online visit is based solely on information provided by him, and that, in the absence of a face-to-face physical evaluation by the physician, the diagnosis he receives is both limited and provisional in terms of accuracy and completeness. This is not intended to replace a full medical face-to-face evaluation by the physician. Corey Parker understands and accepts these terms.    MEDICATION MANAGEMENT NOTE        [facility]    Name and Date of Birth:  Corey Parker 24 y.o. 2000 MRN: [id number]    Date of Visit: September 27, 2024    No Known Allergies  SUBJECTIVE:    Corey is seen today for a follow up for Major Depressive Disorder and anxiety. He  reports that he continues to do relatively well since the last visit. Corey discussed frustrations he is having with his stepfather describes how his stepfather often put him down, nags on him on a frequent basis which causes patient to have decreased self-esteem, frustration, irritability.  Explains how his stepfather attempts to make him feel guilty for spending his hard money and patient states his stepfather should not be concerned with how he spends his money.  Supportive counseling provided, patient may benefit from a direct conversation regarding this matter as he has never approached the stepfather directly with this concern.  Patient would continue to benefit from regular psychotherapy to work on emotional regulation, coping skills but according to chart, has continued noncompliant with psychotherapy.  Continues to report the combination of Vraylar and Zoloft has improved her overall irritability, dysphoria and is overall pleased with the medication regimen.  He denies any side effects from medications and no adjustments required today.  He reports sleeping 7 to 8 hours with the prescribed trazodone 100 mg at bedtime. Anxiety mild to moderate depending on family stressors, as needed Atarax helpful for breakthrough symptoms.  Mood is constricted, appropriate for situation. Continue to educate about the need for psychotherapy.  denies SI and HI.      He denies any side effects from medications.    PLAN:      -Continue Zoloft to 200 mg daily for depressive and anxiety symptoms   PARQ completed including serotonin syndrome, SIADH, worsening depression, suicidality, induction of aron, GI upset, headaches, activation, sexual side effects, sedation, potential drug interactions, and others.     -Continue Vraylar 3mg for treatment resistant depression and augmentation of Zoloft   Patient education completed including dizziness, sedation, GI distress, Akathisia, agitation, orthostatic hypotension, headache, and  cardiovascular risks, metabolic syndrome, NMS, TD, EPS, Seizures, and others      -Continue prn Atarax 50mg daily for breakthrough anxiety     -Continue trazodone 100 mg at bedtime for insomnia.         Aware of 24 hour and weekend coverage for urgent situations accessed by calling [facility] main practice number  Continue psychotherapy with therapist    There are no diagnoses linked to this encounter.      Current Outpatient Medications on File Prior to Visit   Medication Sig Dispense Refill    benzonatate (TESSALON) 200 MG capsule Take 1 capsule (200 mg total) by mouth 3 (three) times a day as needed for cough (Patient not taking: Reported on 6/24/2024) 20 capsule 0    cariprazine (VRAYLAR) 3 MG capsule Take 1 capsule (3 mg total) by mouth daily 30 capsule 2    famotidine (PEPCID) 40 MG tablet Take 1 tablet (40 mg total) by mouth daily at bedtime 30 tablet 3    hydrOXYzine HCL (ATARAX) 50 mg tablet Take 1 tablet (50 mg total) by mouth 2 (two) times a day as needed for itching 60 tablet 2    meclizine (ANTIVERT) 25 mg tablet Take 1 tablet (25 mg total) by mouth every 8 (eight) hours as needed for dizziness or nausea (Patient not taking: Reported on 6/24/2024) 30 tablet 0    ondansetron (ZOFRAN) 4 mg tablet Take 1 tablet (4 mg total) by mouth every 8 (eight) hours as needed for nausea or vomiting 30 tablet 5    pantoprazole (PROTONIX) 40 mg tablet Take 1 tablet (40 mg total) by mouth 2 (two) times a day 90 tablet 3    sertraline (ZOLOFT) 100 mg tablet Take 2 tablets (200 mg total) by mouth daily 60 tablet 2    traZODone (DESYREL) 100 mg tablet Take 1 tablet (100 mg total) by mouth daily at bedtime 30 tablet 2     No current facility-administered medications on file prior to visit.       Psychotherapy Provided:       Medication changes discussed with Corey.  Medication education provided to Corey.     HPI ROS Appetite Changes and Sleep:     He reports difficulty falling asleep, adequate  appetite, adequate energy level. Denies homicidal ideation, denies suicidal ideation    Review Of Systems:      HPI ROS:               Medication Side Effects:  denies    Depression (10 worst): 5/10    Anxiety (10 worst): 2/10    Safety concerns (SI, HI, etc): Denies si and hi    Sleep: 7 hrs    Energy: fair    Appetite: 2-3 meals    Weight Change: denies        Mental Status Evaluation:    Appearance Adequate hygiene and grooming   Behavior calm and cooperative   Mood depressed and improving  Depression Scale - 5 of 10 (0 = No depression)  Anxiety Scale - 2 of 10 (0 = No anxiety)   Speech Normal rate and volume   Affect mood-congruent   Thought Processes Goal directed and coherent   Thought Content Does not verbalize delusional material   Associations Tightly connected   Perceptual Disturbances Denies hallucinations and does not appear to be responding to internal stimuli   Risk Potential Suicidal/Homicidal Ideation - No evidence of suicidal or homicidal ideation and patient does not verbalize suicidal or homicidal ideation  Risk of Violence - No evidence of risk for violence found on assessment  Risk of Self Mutilation - No evidence of risk for self mutilation found on assessment   Orientation oriented to person, place, time/date, and situation   Memory recent and remote memory grossly intact   Consciousness alert and awake   Attention/Concentration attention span and concentration are age appropriate   Insight intact   Judgement intact   Muscle Strength and Gait normal muscle strength and normal muscle tone, normal gait/station and normal balance   Motor Activity no abnormal movements   Language no difficulty naming common objects, no difficulty repeating a phrase, no difficulty writing a sentence   Fund of Knowledge adequate knowledge of current events  adequate fund of knowledge regarding past history  adequate fund of knowledge regarding vocabulary          Past Medical History:    Past Medical History:    Diagnosis Date    Anxiety     Depression     Schizoaffective disorder (HCC)         Past Surgical History:   Procedure Laterality Date    COLONOSCOPY       No Known Allergies  Substance Abuse History:    Social History     Substance and Sexual Activity   Alcohol Use Not Currently    Alcohol/week: 2.0 standard drinks of alcohol    Types: 2 Shots of liquor per week     Social History     Substance and Sexual Activity   Drug Use Yes    Types: Marijuana     Social History:    Social History     Socioeconomic History    Marital status: Single     Spouse name: Not on file    Number of children: Not on file    Years of education: Not on file    Highest education level: Not on file   Occupational History    Not on file   Tobacco Use    Smoking status: Some Days     Types: Cigarettes    Smokeless tobacco: Never    Tobacco comments:     once monthy   Vaping Use    Vaping status: Former    Substances: CBD   Substance and Sexual Activity    Alcohol use: Not Currently     Alcohol/week: 2.0 standard drinks of alcohol     Types: 2 Shots of liquor per week    Drug use: Yes     Types: Marijuana    Sexual activity: Yes     Partners: Female   Other Topics Concern    Not on file   Social History Narrative    Not on file     Social Determinants of Health     Financial Resource Strain: Not on file   Food Insecurity: Not on file   Transportation Needs: Not on file   Physical Activity: Not on file   Stress: Not on file   Social Connections: Unknown (6/18/2024)    Received from [facility]     How often do you feel lonely or isolated from those around you? (Adult - for ages 18 years and over): Not on file   Intimate Partner Violence: Not on file   Housing Stability: Not on file     Family Psychiatric History:     Family History   Problem Relation Age of Onset    Depression Mother     No Known Problems Father      History Review:The following portions of the patient's history were reviewed and updated as appropriate:  allergies, current medications, past family history, past medical history, past social history, past surgical history, and problem list     OBJECTIVE:     Vital signs in last 24 hours:    There were no vitals filed for this visit.  Laboratory Results: Recent Labs (last 2 months):   No visits with results within 2 Month(s) from this visit.   Latest known visit with results is:   Admission on 06/09/2023, Discharged on 06/14/2023   Component Date Value    Sodium 06/10/2023 135     Potassium 06/10/2023 3.7     Chloride 06/10/2023 100     CO2 06/10/2023 25     ANION GAP 06/10/2023 10     BUN 06/10/2023 17     Creatinine 06/10/2023 0.99     Glucose 06/10/2023 126     Calcium 06/10/2023 10.2     AST 06/10/2023 33     ALT 06/10/2023 51     Alkaline Phosphatase 06/10/2023 88     Total Protein 06/10/2023 8.1     Albumin 06/10/2023 4.5     Total Bilirubin 06/10/2023 0.73     eGFR 06/10/2023 106     WBC 06/10/2023 6.97     RBC 06/10/2023 5.27     Hemoglobin 06/10/2023 16.4     Hematocrit 06/10/2023 47.3     MCV 06/10/2023 90     MCH 06/10/2023 31.1     MCHC 06/10/2023 34.7     RDW 06/10/2023 12.1     MPV 06/10/2023 10.6     Platelets 06/10/2023 300     nRBC 06/10/2023 0     Segmented % 06/10/2023 60     Immature Grans % 06/10/2023 0     Lymphocytes % 06/10/2023 27     Monocytes % 06/10/2023 7     Eosinophils Relative 06/10/2023 5     Basophils Relative 06/10/2023 1     Absolute Neutrophils 06/10/2023 4.17     Absolute Immature Grans 06/10/2023 0.02     Absolute Lymphocytes 06/10/2023 1.91     Absolute Monocytes 06/10/2023 0.50     Eosinophils Absolute 06/10/2023 0.33     Basophils Absolute 06/10/2023 0.04     TSH 3RD GENERATON 06/10/2023 1.280     Cholesterol 06/10/2023 218 (H)     Triglycerides 06/10/2023 161 (H)     HDL, Direct 06/10/2023 44     LDL Calculated 06/10/2023 142 (H)     Non-HDL-Chol (CHOL-HDL) 06/10/2023 174     Folate 06/10/2023 10.7     Vitamin B-12 06/10/2023 228     Vit D, 25-Hydroxy 06/10/2023 22.2 (L)      Ventricular Rate 06/10/2023 95     Atrial Rate 06/10/2023 95     NH Interval 06/10/2023 172     QRSD Interval 06/10/2023 100     QT Interval 06/10/2023 350     QTC Interval 06/10/2023 439     P Axis 06/10/2023 68     QRS Minerva 06/10/2023 93     T Wave Axis 06/10/2023 32      I have personally reviewed all pertinent laboratory/tests results.    Suicide/Homicide Risk Assessment:    Risk of Harm to Self:  Protective Factors: no current suicidal ideation, access to mental health treatment, compliant with medications, compliant with mental health treatment, connection to community, having a desire to be alive, resiliency  Based on today's assessment, Corey presents the following risk of harm to self: minimal    Risk of Harm to Others:  Based on today's assessment, Corey presents the following risk of harm to others: minimal    The following interventions are recommended: referral for psychotherapy    Medications Risks/Benefits:      Risks, Benefits And Possible Side Effects Of Medications:    Discussed risks and benefits of treatment with patient including risk of suicidality, serotonin syndrome, increased QTc interval and SIADH related to treatment with antidepressants; Risk of induction of manic symptoms in certain patient populations and risk of parkinsonian symptoms, metabolic syndrome, tardive dyskinesia and neuroleptic malignant syndrome related to treatment with antipsychotic medications     Controlled Medication Discussion:     Not applicable    Treatment Plan:    Due for update/Updated:   no  Last treatment plan done 5/22 .  Treatment Plan due on 11/22/24.    HOLGER Peace 09/27/24    This note was shared with patient.      Visit Time    Visit Start Time: 1220  Visit Stop Time: 1245  Total Visit Duration:  25 minutes

## 2024-10-02 ENCOUNTER — TELEPHONE (OUTPATIENT)
Dept: PSYCHIATRY | Facility: CLINIC | Age: 24
End: 2024-10-02

## 2024-10-30 ENCOUNTER — TELEPHONE (OUTPATIENT)
Dept: PSYCHIATRY | Facility: CLINIC | Age: 24
End: 2024-10-30

## 2024-10-30 NOTE — TELEPHONE ENCOUNTER
NO-SHOW/INTEREST LETTER MAILED TO Corey Parker ON 10/30/2024.    ADDRESS: [address] [place] [id number]

## 2024-11-12 ENCOUNTER — TELEPHONE (OUTPATIENT)
Dept: PSYCHIATRY | Facility: CLINIC | Age: 24
End: 2024-11-12

## 2024-11-12 ENCOUNTER — TELEMEDICINE (OUTPATIENT)
Dept: PSYCHIATRY | Facility: CLINIC | Age: 24
End: 2024-11-12

## 2024-11-12 DIAGNOSIS — Z91.199 NO-SHOW FOR APPOINTMENT: Primary | ICD-10-CM

## 2024-11-12 NOTE — PSYCH
No Call. No Show. No Charge    Corey Parker no showed 11/12/24 appointment , staff called and left message to reschedule appointment     Treatment Plan not due at this session.     
verbal instruction

## 2024-11-12 NOTE — TELEPHONE ENCOUNTER
DISCHARGE LETTER for AKOSUA JOHNSON (certified) placed in outgoing mail on 11/12/24.    Article #:  [id number]    Address:  [address] [place] [id number]

## 2025-01-31 ENCOUNTER — TELEPHONE (OUTPATIENT)
Age: 25
End: 2025-01-31

## 2025-01-31 ENCOUNTER — TELEMEDICINE (OUTPATIENT)
Dept: PSYCHIATRY | Facility: CLINIC | Age: 25
End: 2025-01-31
Payer: COMMERCIAL

## 2025-01-31 DIAGNOSIS — F41.1 GENERALIZED ANXIETY DISORDER: ICD-10-CM

## 2025-01-31 DIAGNOSIS — F33.41 MDD (MAJOR DEPRESSIVE DISORDER), RECURRENT, IN PARTIAL REMISSION (HCC): Primary | ICD-10-CM

## 2025-01-31 PROCEDURE — 99214 OFFICE O/P EST MOD 30 MIN: CPT

## 2025-01-31 RX ORDER — TRAZODONE HYDROCHLORIDE 100 MG/1
100 TABLET ORAL
Qty: 30 TABLET | Refills: 2 | Status: SHIPPED | OUTPATIENT
Start: 2025-01-31 | End: 2025-05-01

## 2025-01-31 RX ORDER — SERTRALINE HYDROCHLORIDE 100 MG/1
200 TABLET, FILM COATED ORAL DAILY
Qty: 60 TABLET | Refills: 2 | Status: SHIPPED | OUTPATIENT
Start: 2025-01-31 | End: 2025-05-01

## 2025-01-31 RX ORDER — HYDROXYZINE HYDROCHLORIDE 50 MG/1
50 TABLET, FILM COATED ORAL 2 TIMES DAILY PRN
Qty: 60 TABLET | Refills: 2 | Status: SHIPPED | OUTPATIENT
Start: 2025-01-31

## 2025-01-31 NOTE — LETTER
ATTN: Review Committee      Patient: Corey Parker :2000 Member ID:[id number]   Re: Request for Reconsideration of Vraylar 3mg       To Whom It May Concern:   Corey Parker 2000 was denied coverage for the medication Vraylar 3mg  on 2025. The denial reason was stated as not covered due to not meeting insurance criteria. I am requesting a redetermination of the denial of coverage due to the Patient having documented failure to Seroquel as it caused weight gain and ineffectiveness.  Patient having  documented failure to higher dose of Zoloft as it caused suicidal ideation which led to inpatient psychiatric hospitalization.  Patient's suicidal ideation and severe depression has been stabilized with Vraylar.      In conclusion, please reconsider the denial of Vraylar 3 mg   for my patient. I would appreciate prompt review of this appeal and approval of this FDA-approved medication. I can be reached by phone at [phone] or via fax at [phone] for additional information and discussion. Thank you.      Sincerely,   HOLGER Peace

## 2025-01-31 NOTE — BH TREATMENT PLAN
TREATMENT PLAN (Medication Management Only)        [facility]    Name and Date of Birth:  Corey Parker 24 y.o. 2000  Date of Treatment Plan: January 31, 2025  Diagnosis/Diagnoses:    1. MDD (major depressive disorder), recurrent, in partial remission (HCC)    2. Generalized anxiety disorder      Strengths/Personal Resources for Self-Care: supportive family, taking medications as prescribed, ability to adapt to life changes, ability to communicate needs, ability to communicate well, ability to listen, ability to reason.  Area/Areas of need (in own words): anxiety symptoms, depressive symptoms  1. Long Term Goal: continue improvement in depression.  Target Date:6 months - 7/31/2025  Person/Persons responsible for completion of goal: Corey  2.  Short Term Objective (s) - How will we reach this goal?:   A. Provider new recommended medication/dosage changes and/or continue medication(s): continue current medications as prescribed.  B. Attend medication management appointments regularly.  C. Take psychiatric medications responsibly.  Target Date:6 months - 7/31/2025  Person/Persons Responsible for Completion of Goal: Corey  Progress Towards Goals: continuing treatment  Treatment Modality: medication management every 2 months  Review due 180 days from date of this plan: 6 months - 7/31/2025  Expected length of service: ongoing treatment  My Physician/PA/NP and I have developed this plan together and I agree to work on the goals and objectives. I understand the treatment goals that were developed for my treatment.

## 2025-01-31 NOTE — TELEPHONE ENCOUNTER
PA for Vraylar 3 mg SUBMITTED to [employer]     via    []CMM-KEY:   []Surescripts-Case ID #   [x]Availity-Auth ID # [id number] # [id number]  []Faxed to plan   []Other website   []Phone call Case ID #     []PA sent as URGENT    All office notes, labs and other pertaining documents and studies sent. Clinical questions answered. Awaiting determination from insurance company.     Turnaround time for your insurance to make a decision on your Prior Authorization can take 7-21 business days.

## 2025-01-31 NOTE — PSYCH
Virtual Regular Visit    Verification of patient location:    Patient is located in the following state in which I hold an active license [place]    Assessment & Plan  MDD (major depressive disorder), recurrent, in partial remission (HCC)  -Continue Zoloft to 200 mg daily for depressive and anxiety symptoms   PARQ completed including serotonin syndrome, SIADH, worsening depression, suicidality, induction of aron, GI upset, headaches, activation, sexual side effects, sedation, potential drug interactions, and others.     -Continue Vraylar 3mg for treatment resistant depression and augmentation of Zoloft   Patient education completed including dizziness, sedation, GI distress, Akathisia, agitation, orthostatic hypotension, headache, and cardiovascular risks, metabolic syndrome, NMS, TD, EPS, Seizures, and others         -Continue trazodone 100 mg at bedtime for insomnia.   Generalized anxiety disorder  -Continue prn Atarax 50mg daily for breakthrough anxiety           Encounter provider HOLGER Peace    Provider located at    [address] [facility] [address] [phone]    Recent Visits  No visits were found meeting these conditions.  Showing recent visits within past 7 days and meeting all other requirements  Today's Visits  Date Type Provider Dept   01/31/25 Telemedicine HOLGER Peace [facility]   Showing today's visits and meeting all other requirements  Future Appointments  No visits were found meeting these conditions.  Showing future appointments within next 150 days and meeting all other requirements         The patient was identified by name and date of birth. Corey Ayersdemetria was informed that this is a telemedicine visit and that the visit is being conducted throughthe Epic Embedded platform. He agrees to proceed..  My office door was closed. No one else was in the room.  He acknowledged  consent and understanding of privacy and security of the video platform. The patient has agreed to participate and understands they can discontinue the visit at any time.    Patient is aware this is a billable service.     HPI     Current Outpatient Medications   Medication Sig Dispense Refill   • cariprazine (VRAYLAR) 3 MG capsule Take 1 capsule (3 mg total) by mouth daily 30 capsule 2   • famotidine (PEPCID) 40 MG tablet Take 1 tablet (40 mg total) by mouth daily at bedtime 30 tablet 3   • hydrOXYzine HCL (ATARAX) 50 mg tablet Take 1 tablet (50 mg total) by mouth 2 (two) times a day as needed for itching 60 tablet 2   • ondansetron (ZOFRAN) 4 mg tablet Take 1 tablet (4 mg total) by mouth every 8 (eight) hours as needed for nausea or vomiting 30 tablet 5   • pantoprazole (PROTONIX) 40 mg tablet Take 1 tablet (40 mg total) by mouth 2 (two) times a day 90 tablet 3   • sertraline (ZOLOFT) 100 mg tablet Take 2 tablets (200 mg total) by mouth daily 60 tablet 2   • traZODone (DESYREL) 100 mg tablet Take 1 tablet (100 mg total) by mouth daily at bedtime 30 tablet 2   • benzonatate (TESSALON) 200 MG capsule Take 1 capsule (200 mg total) by mouth 3 (three) times a day as needed for cough (Patient not taking: Reported on 1/31/2025) 20 capsule 0   • meclizine (ANTIVERT) 25 mg tablet Take 1 tablet (25 mg total) by mouth every 8 (eight) hours as needed for dizziness or nausea (Patient not taking: Reported on 1/31/2025) 30 tablet 0     No current facility-administered medications for this visit.       Review of Systems  Video Exam    There were no vitals filed for this visit.    Physical Exam   As a result of this visit, I have referred the patient for further respiratory evaluation. No      VIRTUAL VISIT DISCLAIMER    Corey Clementeaakash acknowledges that he has consented to an online visit or consultation. He understands that the online visit is based solely on information provided by him, and that, in the absence of a  face-to-face physical evaluation by the physician, the diagnosis he receives is both limited and provisional in terms of accuracy and completeness. This is not intended to replace a full medical face-to-face evaluation by the physician. Corey Parker understands and accepts these terms.    MEDICATION MANAGEMENT NOTE        [facility]    Name and Date of Birth:  Corey Parker 24 y.o. 2000 MRN: [id number]    Date of Visit: January 31, 2025    No Known Allergies  SUBJECTIVE:    Corey is seen today for a follow up for Major Depressive Disorder and anxiety. He reports that he continues to do relatively well since the last visit. Corey describes losing his job during this past holiday season, caused increased depression but feels he has come out of it at this point.  States he is in a supportive romantic relationship which has helped greatly during the holiday season.  He appears hopeful to find future employment in the near future which he has done in the past on multiple occasions.  His partner is helping him financially for the time being as well as his parents.  Reports depression is mild to moderate at this time with no severe symptoms, continues with periods of dysphoria, sadness regarding finances and future career opportunities.  Denies any suicidal ideation.  Admitted to passive SI surrounding the week where he lost a job, never had a plan, he is agreeable to go to the ER immediately if a plan ever occurs.  He believes he is back at baseline, reports adequate sleep appetite with the trazodone.  Continues to report improved mood since Vraylar added to his antidepressant regimen.  He is denying side effects of the medication and no evidence of motor symptoms.  Mood is constricted, talkative, appropriate for situation during today's counter.  Continues to have a strong support system around him.  Would benefit from psychotherapy if he ever decides to  pursue.  He denies SI and HI.            He denies any side effects from medications.    PLAN:      -Continue Zoloft to 200 mg daily for depressive and anxiety symptoms   PARQ completed including serotonin syndrome, SIADH, worsening depression, suicidality, induction of aron, GI upset, headaches, activation, sexual side effects, sedation, potential drug interactions, and others.     -Continue Vraylar 3mg for treatment resistant depression and augmentation of Zoloft   Patient education completed including dizziness, sedation, GI distress, Akathisia, agitation, orthostatic hypotension, headache, and cardiovascular risks, metabolic syndrome, NMS, TD, EPS, Seizures, and others      -Continue prn Atarax 50mg daily for breakthrough anxiety     -Continue trazodone 100 mg at bedtime for insomnia.         Aware of 24 hour and weekend coverage for urgent situations accessed by calling [facility] main practice number  Continue psychotherapy with therapist    Diagnoses and all orders for this visit:    MDD (major depressive disorder), recurrent, in partial remission (HCC)  -     cariprazine (VRAYLAR) 3 MG capsule; Take 1 capsule (3 mg total) by mouth daily  -     traZODone (DESYREL) 100 mg tablet; Take 1 tablet (100 mg total) by mouth daily at bedtime    Generalized anxiety disorder  -     hydrOXYzine HCL (ATARAX) 50 mg tablet; Take 1 tablet (50 mg total) by mouth 2 (two) times a day as needed for itching  -     sertraline (ZOLOFT) 100 mg tablet; Take 2 tablets (200 mg total) by mouth daily          Current Outpatient Medications on File Prior to Visit   Medication Sig Dispense Refill   • famotidine (PEPCID) 40 MG tablet Take 1 tablet (40 mg total) by mouth daily at bedtime 30 tablet 3   • ondansetron (ZOFRAN) 4 mg tablet Take 1 tablet (4 mg total) by mouth every 8 (eight) hours as needed for nausea or vomiting 30 tablet 5   • pantoprazole (PROTONIX) 40 mg tablet Take 1 tablet (40 mg total) by mouth 2  (two) times a day 90 tablet 3   • [DISCONTINUED] cariprazine (VRAYLAR) 3 MG capsule Take 1 capsule (3 mg total) by mouth daily 30 capsule 2   • [DISCONTINUED] hydrOXYzine HCL (ATARAX) 50 mg tablet Take 1 tablet (50 mg total) by mouth 2 (two) times a day as needed for itching 60 tablet 2   • [DISCONTINUED] sertraline (ZOLOFT) 100 mg tablet Take 2 tablets (200 mg total) by mouth daily 60 tablet 2   • [DISCONTINUED] traZODone (DESYREL) 100 mg tablet Take 1 tablet (100 mg total) by mouth daily at bedtime 30 tablet 2   • benzonatate (TESSALON) 200 MG capsule Take 1 capsule (200 mg total) by mouth 3 (three) times a day as needed for cough (Patient not taking: Reported on 1/31/2025) 20 capsule 0   • meclizine (ANTIVERT) 25 mg tablet Take 1 tablet (25 mg total) by mouth every 8 (eight) hours as needed for dizziness or nausea (Patient not taking: Reported on 1/31/2025) 30 tablet 0     No current facility-administered medications on file prior to visit.       Psychotherapy Provided:       Medication changes discussed with Corey.  Medication education provided to Corey.     HPI ROS Appetite Changes and Sleep:     He reports difficulty falling asleep, adequate appetite, adequate energy level. Denies homicidal ideation, denies suicidal ideation    Review Of Systems:      HPI ROS:               Medication Side Effects:  denies    Depression (10 worst): 4/10    Anxiety (10 worst): 2/10    Safety concerns (SI, HI, etc): Denies si and hi    Sleep: 7 hrs    Energy: fair    Appetite: 2-3 meals    Weight Change: denies        Mental Status Evaluation:    Appearance Adequate hygiene and grooming   Behavior calm and cooperative   Mood depressed  Depression Scale - 4 of 10 (0 = No depression)  Anxiety Scale - 2 of 10 (0 = No anxiety)   Speech Normal rate and volume   Affect mood-congruent   Thought Processes Goal directed and coherent   Thought Content Does not verbalize delusional material   Associations Tightly connected   Perceptual  Disturbances Denies hallucinations and does not appear to be responding to internal stimuli   Risk Potential Suicidal/Homicidal Ideation - No evidence of suicidal or homicidal ideation and patient does not verbalize suicidal or homicidal ideation  Risk of Violence - No evidence of risk for violence found on assessment  Risk of Self Mutilation - No evidence of risk for self mutilation found on assessment   Orientation oriented to person, place, time/date, and situation   Memory recent and remote memory grossly intact   Consciousness alert and awake   Attention/Concentration attention span and concentration are age appropriate   Insight intact   Judgement intact   Muscle Strength and Gait normal muscle strength and normal muscle tone, normal gait/station and normal balance   Motor Activity no abnormal movements   Language no difficulty naming common objects, no difficulty repeating a phrase, no difficulty writing a sentence   Fund of Knowledge adequate knowledge of current events  adequate fund of knowledge regarding past history  adequate fund of knowledge regarding vocabulary          Past Medical History:    Past Medical History:   Diagnosis Date   • Anxiety    • Depression    • Schizoaffective disorder (HCC)         Past Surgical History:   Procedure Laterality Date   • COLONOSCOPY       No Known Allergies  Substance Abuse History:    Social History     Substance and Sexual Activity   Alcohol Use Not Currently   • Alcohol/week: 2.0 standard drinks of alcohol   • Types: 2 Shots of liquor per week     Social History     Substance and Sexual Activity   Drug Use Yes   • Types: Marijuana     Social History:    Social History     Socioeconomic History   • Marital status: Single     Spouse name: Not on file   • Number of children: Not on file   • Years of education: Not on file   • Highest education level: Not on file   Occupational History   • Not on file   Tobacco Use   • Smoking status: Some Days     Types: Cigarettes    • Smokeless tobacco: Never   • Tobacco comments:     once monthy   Vaping Use   • Vaping status: Former   • Substances: CBD   Substance and Sexual Activity   • Alcohol use: Not Currently     Alcohol/week: 2.0 standard drinks of alcohol     Types: 2 Shots of liquor per week   • Drug use: Yes     Types: Marijuana   • Sexual activity: Yes     Partners: Female   Other Topics Concern   • Not on file   Social History Narrative   • Not on file     Social Drivers of Health     Financial Resource Strain: Not on file   Food Insecurity: Not on file   Transportation Needs: Not on file   Physical Activity: Not on file   Stress: Not on file   Social Connections: Unknown (6/18/2024)    Received from [email or web address]    • How often do you feel lonely or isolated from those around you? (Adult - for ages 18 years and over): Not on file   Intimate Partner Violence: Not on file   Housing Stability: Not on file     Family Psychiatric History:     Family History   Problem Relation Age of Onset   • Depression Mother    • No Known Problems Father      History Review:The following portions of the patient's history were reviewed and updated as appropriate: allergies, current medications, past family history, past medical history, past social history, past surgical history, and problem list     OBJECTIVE:     Vital signs in last 24 hours:    There were no vitals filed for this visit.  Laboratory Results: Recent Labs (last 2 months):   No visits with results within 2 Month(s) from this visit.   Latest known visit with results is:   Admission on 06/09/2023, Discharged on 06/14/2023   Component Date Value   • Sodium 06/10/2023 135    • Potassium 06/10/2023 3.7    • Chloride 06/10/2023 100    • CO2 06/10/2023 25    • ANION GAP 06/10/2023 10    • BUN 06/10/2023 17    • Creatinine 06/10/2023 0.99    • Glucose 06/10/2023 126    • Calcium 06/10/2023 10.2    • AST 06/10/2023 33    • ALT 06/10/2023 51    • Alkaline Phosphatase  06/10/2023 88    • Total Protein 06/10/2023 8.1    • Albumin 06/10/2023 4.5    • Total Bilirubin 06/10/2023 0.73    • eGFR 06/10/2023 106    • WBC 06/10/2023 6.97    • RBC 06/10/2023 5.27    • Hemoglobin 06/10/2023 16.4    • Hematocrit 06/10/2023 47.3    • MCV 06/10/2023 90    • MCH 06/10/2023 31.1    • MCHC 06/10/2023 34.7    • RDW 06/10/2023 12.1    • MPV 06/10/2023 10.6    • Platelets 06/10/2023 300    • nRBC 06/10/2023 0    • Segmented % 06/10/2023 60    • Immature Grans % 06/10/2023 0    • Lymphocytes % 06/10/2023 27    • Monocytes % 06/10/2023 7    • Eosinophils Relative 06/10/2023 5    • Basophils Relative 06/10/2023 1    • Absolute Neutrophils 06/10/2023 4.17    • Absolute Immature Grans 06/10/2023 0.02    • Absolute Lymphocytes 06/10/2023 1.91    • Absolute Monocytes 06/10/2023 0.50    • Eosinophils Absolute 06/10/2023 0.33    • Basophils Absolute 06/10/2023 0.04    • TSH 3RD GENERATON 06/10/2023 1.280    • Cholesterol 06/10/2023 218 (H)    • Triglycerides 06/10/2023 161 (H)    • HDL, Direct 06/10/2023 44    • LDL Calculated 06/10/2023 142 (H)    • Non-HDL-Chol (CHOL-HDL) 06/10/2023 174    • Folate 06/10/2023 10.7    • Vitamin B-12 06/10/2023 228    • Vit D, 25-Hydroxy 06/10/2023 22.2 (L)    • Ventricular Rate 06/10/2023 95    • Atrial Rate 06/10/2023 95    • IA Interval 06/10/2023 172    • QRSD Interval 06/10/2023 100    • QT Interval 06/10/2023 350    • QTC Interval 06/10/2023 439    • P Axis 06/10/2023 68    • QRS Axis 06/10/2023 93    • T Wave Axis 06/10/2023 32      I have personally reviewed all pertinent laboratory/tests results.    Suicide/Homicide Risk Assessment:    Risk of Harm to Self:  Protective Factors: no current suicidal ideation, access to mental health treatment, compliant with medications, compliant with mental health treatment, connection to community, having a desire to be alive, resiliency  Based on today's assessment, Corey presents the following risk of harm to self:  minimal    Risk of Harm to Others:  Based on today's assessment, Corey presents the following risk of harm to others: minimal    The following interventions are recommended: referral for psychotherapy    Medications Risks/Benefits:      Risks, Benefits And Possible Side Effects Of Medications:    Discussed risks and benefits of treatment with patient including risk of suicidality, serotonin syndrome, increased QTc interval and SIADH related to treatment with antidepressants; Risk of induction of manic symptoms in certain patient populations and risk of parkinsonian symptoms, metabolic syndrome, tardive dyskinesia and neuroleptic malignant syndrome related to treatment with antipsychotic medications     Controlled Medication Discussion:     Not applicable    Treatment Plan:    Due for update/Updated:   no  Last treatment plan done 1/31, due 7/31/25    HOLGER Peace 01/31/25    This note was shared with patient.      Visit Time    Visit Start Time: 1250  Visit Stop Time: 115  Total Visit Duration:  25 minutes

## 2025-02-03 ENCOUNTER — TELEPHONE (OUTPATIENT)
Dept: PSYCHIATRY | Facility: CLINIC | Age: 25
End: 2025-02-03

## 2025-02-04 DIAGNOSIS — Z00.6 ENCOUNTER FOR EXAMINATION FOR NORMAL COMPARISON OR CONTROL IN CLINICAL RESEARCH PROGRAM: ICD-10-CM

## 2025-02-04 NOTE — TELEPHONE ENCOUNTER
PA for Vraylar 3 mg  APPEALED via     []CMM  []SS  [x]Letter sent to insurance via fax [employer]  [phone] AUTH # [id number]  []Other site or means     All necessary records sent. Will await response from insurance company    Turnaround time for a decision to be made on an appeal could take up to 30 business days

## 2025-02-04 NOTE — TELEPHONE ENCOUNTER
PA for Vraylar 3 mg  DENIED    Reason:(Screenshot if applicable)        Message sent to office clinical pool Yes    Denial letter scanned into Media Yes    Appeal started No (Provider will need to decide if appeal is warranted and send clinical documentation to Prior Authorization Team for initiation.)    **Please follow up with your patient regarding denial and next steps**

## 2025-02-05 ENCOUNTER — APPOINTMENT (OUTPATIENT)
Dept: LAB | Facility: MEDICAL CENTER | Age: 25
End: 2025-02-05
Payer: COMMERCIAL

## 2025-02-05 DIAGNOSIS — F33.41 MDD (MAJOR DEPRESSIVE DISORDER), RECURRENT, IN PARTIAL REMISSION (HCC): ICD-10-CM

## 2025-02-05 DIAGNOSIS — Z00.6 ENCOUNTER FOR EXAMINATION FOR NORMAL COMPARISON OR CONTROL IN CLINICAL RESEARCH PROGRAM: ICD-10-CM

## 2025-02-05 LAB
ALBUMIN SERPL BCG-MCNC: 4.3 G/DL (ref 3.5–5)
ALP SERPL-CCNC: 89 U/L (ref 34–104)
ALT SERPL W P-5'-P-CCNC: 33 U/L (ref 7–52)
ANION GAP SERPL CALCULATED.3IONS-SCNC: 13 MMOL/L (ref 4–13)
AST SERPL W P-5'-P-CCNC: 24 U/L (ref 13–39)
BASOPHILS # BLD AUTO: 0.05 THOUSANDS/ΜL (ref 0–0.1)
BASOPHILS NFR BLD AUTO: 1 % (ref 0–1)
BILIRUB SERPL-MCNC: 0.26 MG/DL (ref 0.2–1)
BUN SERPL-MCNC: 14 MG/DL (ref 5–25)
CALCIUM SERPL-MCNC: 9.4 MG/DL (ref 8.4–10.2)
CHLORIDE SERPL-SCNC: 103 MMOL/L (ref 96–108)
CHOLEST SERPL-MCNC: 179 MG/DL (ref ?–200)
CO2 SERPL-SCNC: 24 MMOL/L (ref 21–32)
CREAT SERPL-MCNC: 0.85 MG/DL (ref 0.6–1.3)
EOSINOPHIL # BLD AUTO: 0.22 THOUSAND/ΜL (ref 0–0.61)
EOSINOPHIL NFR BLD AUTO: 3 % (ref 0–6)
ERYTHROCYTE [DISTWIDTH] IN BLOOD BY AUTOMATED COUNT: 12.6 % (ref 11.6–15.1)
EST. AVERAGE GLUCOSE BLD GHB EST-MCNC: 111 MG/DL
GFR SERPL CREATININE-BSD FRML MDRD: 121 ML/MIN/1.73SQ M
GLUCOSE P FAST SERPL-MCNC: 94 MG/DL (ref 65–99)
HBA1C MFR BLD: 5.5 %
HCT VFR BLD AUTO: 44.8 % (ref 36.5–49.3)
HDLC SERPL-MCNC: 47 MG/DL
HGB BLD-MCNC: 15.3 G/DL (ref 12–17)
IMM GRANULOCYTES # BLD AUTO: 0.01 THOUSAND/UL (ref 0–0.2)
IMM GRANULOCYTES NFR BLD AUTO: 0 % (ref 0–2)
LDLC SERPL CALC-MCNC: 98 MG/DL (ref 0–100)
LYMPHOCYTES # BLD AUTO: 2.35 THOUSANDS/ΜL (ref 0.6–4.47)
LYMPHOCYTES NFR BLD AUTO: 35 % (ref 14–44)
MCH RBC QN AUTO: 30 PG (ref 26.8–34.3)
MCHC RBC AUTO-ENTMCNC: 34.2 G/DL (ref 31.4–37.4)
MCV RBC AUTO: 88 FL (ref 82–98)
MONOCYTES # BLD AUTO: 0.71 THOUSAND/ΜL (ref 0.17–1.22)
MONOCYTES NFR BLD AUTO: 11 % (ref 4–12)
NEUTROPHILS # BLD AUTO: 3.38 THOUSANDS/ΜL (ref 1.85–7.62)
NEUTS SEG NFR BLD AUTO: 50 % (ref 43–75)
NONHDLC SERPL-MCNC: 132 MG/DL
NRBC BLD AUTO-RTO: 0 /100 WBCS
PLATELET # BLD AUTO: 302 THOUSANDS/UL (ref 149–390)
PMV BLD AUTO: 10.6 FL (ref 8.9–12.7)
POTASSIUM SERPL-SCNC: 4.1 MMOL/L (ref 3.5–5.3)
PROT SERPL-MCNC: 7.2 G/DL (ref 6.4–8.4)
RBC # BLD AUTO: 5.1 MILLION/UL (ref 3.88–5.62)
SODIUM SERPL-SCNC: 140 MMOL/L (ref 135–147)
TRIGL SERPL-MCNC: 170 MG/DL (ref ?–150)
TSH SERPL DL<=0.05 MIU/L-ACNC: 0.78 UIU/ML (ref 0.45–4.5)
WBC # BLD AUTO: 6.72 THOUSAND/UL (ref 4.31–10.16)

## 2025-02-05 PROCEDURE — 80053 COMPREHEN METABOLIC PANEL: CPT

## 2025-02-05 PROCEDURE — 83036 HEMOGLOBIN GLYCOSYLATED A1C: CPT

## 2025-02-05 PROCEDURE — 36415 COLL VENOUS BLD VENIPUNCTURE: CPT

## 2025-02-05 PROCEDURE — 85025 COMPLETE CBC W/AUTO DIFF WBC: CPT

## 2025-02-05 PROCEDURE — 80061 LIPID PANEL: CPT

## 2025-02-05 PROCEDURE — 84443 ASSAY THYROID STIM HORMONE: CPT

## 2025-02-18 LAB
APOB+LDLR+PCSK9 GENE MUT ANL BLD/T: NOT DETECTED
BRCA1+BRCA2 DEL+DUP + FULL MUT ANL BLD/T: NOT DETECTED
MLH1+MSH2+MSH6+PMS2 GN DEL+DUP+FUL M: NOT DETECTED

## 2025-03-11 DIAGNOSIS — F33.41 MDD (MAJOR DEPRESSIVE DISORDER), RECURRENT, IN PARTIAL REMISSION (HCC): Primary | ICD-10-CM

## 2025-03-11 RX ORDER — ARIPIPRAZOLE 2 MG/1
2 TABLET ORAL DAILY
Qty: 30 TABLET | Refills: 1 | Status: SHIPPED | OUTPATIENT
Start: 2025-03-11

## 2025-04-04 ENCOUNTER — OFFICE VISIT (OUTPATIENT)
Dept: URGENT CARE | Facility: MEDICAL CENTER | Age: 25
End: 2025-04-04
Payer: COMMERCIAL

## 2025-04-04 ENCOUNTER — APPOINTMENT (EMERGENCY)
Dept: RADIOLOGY | Facility: HOSPITAL | Age: 25
End: 2025-04-04
Payer: COMMERCIAL

## 2025-04-04 ENCOUNTER — HOSPITAL ENCOUNTER (EMERGENCY)
Facility: HOSPITAL | Age: 25
Discharge: HOME | End: 2025-04-04
Attending: EMERGENCY MEDICINE
Payer: COMMERCIAL

## 2025-04-04 VITALS
HEART RATE: 69 BPM | TEMPERATURE: 98.8 F | OXYGEN SATURATION: 96 % | DIASTOLIC BLOOD PRESSURE: 69 MMHG | RESPIRATION RATE: 18 BRPM | SYSTOLIC BLOOD PRESSURE: 127 MMHG

## 2025-04-04 DIAGNOSIS — R06.6 HICCUPS: Primary | ICD-10-CM

## 2025-04-04 LAB
ALBUMIN SERPL BCG-MCNC: 4.3 G/DL (ref 3.5–5)
ALP SERPL-CCNC: 84 U/L (ref 34–104)
ALT SERPL W P-5'-P-CCNC: 48 U/L (ref 7–52)
ANION GAP SERPL CALCULATED.3IONS-SCNC: 10 MMOL/L (ref 4–13)
AST SERPL W P-5'-P-CCNC: 29 U/L (ref 13–39)
BILIRUB SERPL-MCNC: 0.43 MG/DL (ref 0.2–1)
BUN SERPL-MCNC: 12 MG/DL (ref 5–25)
CALCIUM SERPL-MCNC: 9.3 MG/DL (ref 8.4–10.2)
CHLORIDE SERPL-SCNC: 104 MMOL/L (ref 96–108)
CO2 SERPL-SCNC: 24 MMOL/L (ref 21–32)
CREAT SERPL-MCNC: 0.87 MG/DL (ref 0.6–1.3)
GFR SERPL CREATININE-BSD FRML MDRD: 120 ML/MIN/1.73SQ M
GLUCOSE SERPL-MCNC: 86 MG/DL (ref 65–140)
POTASSIUM SERPL-SCNC: 3.6 MMOL/L (ref 3.5–5.3)
PROT SERPL-MCNC: 7.2 G/DL (ref 6.4–8.4)
SODIUM SERPL-SCNC: 138 MMOL/L (ref 135–147)

## 2025-04-04 PROCEDURE — 99284 EMERGENCY DEPT VISIT MOD MDM: CPT | Performed by: EMERGENCY MEDICINE

## 2025-04-04 PROCEDURE — 99283 EMERGENCY DEPT VISIT LOW MDM: CPT

## 2025-04-04 PROCEDURE — 36415 COLL VENOUS BLD VENIPUNCTURE: CPT | Performed by: EMERGENCY MEDICINE

## 2025-04-04 PROCEDURE — 96374 THER/PROPH/DIAG INJ IV PUSH: CPT

## 2025-04-04 PROCEDURE — 99213 OFFICE O/P EST LOW 20 MIN: CPT | Performed by: PHYSICIAN ASSISTANT

## 2025-04-04 PROCEDURE — 80053 COMPREHEN METABOLIC PANEL: CPT | Performed by: EMERGENCY MEDICINE

## 2025-04-04 PROCEDURE — 71045 X-RAY EXAM CHEST 1 VIEW: CPT

## 2025-04-04 RX ORDER — METOCLOPRAMIDE HYDROCHLORIDE 5 MG/ML
10 INJECTION INTRAMUSCULAR; INTRAVENOUS ONCE
Status: COMPLETED | OUTPATIENT
Start: 2025-04-04 | End: 2025-04-04

## 2025-04-04 RX ORDER — METOCLOPRAMIDE 10 MG/1
10 TABLET ORAL EVERY 6 HOURS PRN
Qty: 20 TABLET | Refills: 0 | Status: SHIPPED | OUTPATIENT
Start: 2025-04-04

## 2025-04-04 RX ORDER — CHLORPROMAZINE HCI 25 MG/ML
25 INJECTION INTRAMUSCULAR ONCE
Status: DISCONTINUED | OUTPATIENT
Start: 2025-04-04 | End: 2025-04-04

## 2025-04-04 RX ADMIN — METOCLOPRAMIDE 10 MG: 5 INJECTION, SOLUTION INTRAMUSCULAR; INTRAVENOUS at 19:00

## 2025-04-04 NOTE — DISCHARGE INSTRUCTIONS
No abnormal findings on workup.  Prescription for medication sent to pharmacy use as needed as directed.

## 2025-04-04 NOTE — PROGRESS NOTES
[facility]        NAME: Corey Parker is a 24 y.o. male  : 2000    MRN: [id number]  DATE: 2025  TIME: 6:53 PM    Assessment and Plan   Hiccups [R06.6]  1. Hiccups  Transfer to other facility        Patient to ED for further evaluation and management    Chief Complaint     Chief Complaint   Patient presents with    Hiccups     Hiccups for 2 days straight.          History of Present Illness       25yo M presents for persistent hiccups since afternoon of 25.  Patient is having difficulty sleeping due to this.  Has had some increased reflux symptoms lately but is compliant with his complete medication regimen.  He attempted deep breathing, blowing into paper bag, holding his breath and drinking cold water.  He denies shortness of breath wheezing or cough.  He endorses decreased stooling over the last 2 days but denies abdominal pain nausea or vomiting.        Review of Systems   Review of Systems   Constitutional:  Negative for fever.   Respiratory:  Negative for cough, shortness of breath and wheezing.    Gastrointestinal:  Negative for abdominal pain, diarrhea, nausea and vomiting.         Current Medications       Current Outpatient Medications:     cariprazine (VRAYLAR) 3 MG capsule, Take 3 mg by mouth daily, Disp: , Rfl:     famotidine (PEPCID) 40 MG tablet, Take 1 tablet (40 mg total) by mouth daily at bedtime, Disp: 30 tablet, Rfl: 3    hydrOXYzine HCL (ATARAX) 50 mg tablet, Take 1 tablet (50 mg total) by mouth 2 (two) times a day as needed for itching, Disp: 60 tablet, Rfl: 2    ondansetron (ZOFRAN) 4 mg tablet, Take 1 tablet (4 mg total) by mouth every 8 (eight) hours as needed for nausea or vomiting, Disp: 30 tablet, Rfl: 5    pantoprazole (PROTONIX) 40 mg tablet, Take 1 tablet (40 mg total) by mouth 2 (two) times a day, Disp: 90 tablet, Rfl: 3    sertraline (ZOLOFT) 100 mg tablet, Take 2 tablets (200 mg total) by mouth daily, Disp: 60 tablet, Rfl: 2    traZODone (DESYREL)  100 mg tablet, Take 1 tablet (100 mg total) by mouth daily at bedtime, Disp: 30 tablet, Rfl: 2    ARIPiprazole (ABILIFY) 2 mg tablet, Take 1 tablet (2 mg total) by mouth daily (Patient not taking: Reported on 4/4/2025), Disp: 30 tablet, Rfl: 1    benzonatate (TESSALON) 200 MG capsule, Take 1 capsule (200 mg total) by mouth 3 (three) times a day as needed for cough (Patient not taking: Reported on 6/24/2024), Disp: 20 capsule, Rfl: 0    meclizine (ANTIVERT) 25 mg tablet, Take 1 tablet (25 mg total) by mouth every 8 (eight) hours as needed for dizziness or nausea (Patient not taking: Reported on 6/24/2024), Disp: 30 tablet, Rfl: 0    Current Allergies     Allergies as of 04/04/2025    (No Known Allergies)            The following portions of the patient's history were reviewed and updated as appropriate: allergies, current medications, past family history, past medical history, past social history, past surgical history and problem list.     Past Medical History:   Diagnosis Date    Anxiety     Depression     Schizoaffective disorder (HCC)        Past Surgical History:   Procedure Laterality Date    COLONOSCOPY         Family History   Problem Relation Age of Onset    Depression Mother     No Known Problems Father          Medications have been verified.        Objective   There were no vitals taken for this visit.  No LMP for male patient.       Physical Exam     Physical Exam  Constitutional:       Appearance: He is well-developed.   HENT:      Head: Normocephalic.      Right Ear: Hearing and tympanic membrane normal.      Left Ear: Hearing normal.      Nose: No mucosal edema.      Mouth/Throat:      Pharynx: No oropharyngeal exudate or posterior oropharyngeal erythema.      Tonsils: No tonsillar exudate.   Cardiovascular:      Rate and Rhythm: Normal rate and regular rhythm.      Heart sounds: Normal heart sounds. No murmur heard.     No friction rub. No gallop.   Pulmonary:      Effort: Pulmonary effort is normal.  No respiratory distress.      Breath sounds: Normal breath sounds. No stridor. No decreased breath sounds, wheezing, rhonchi or rales.   Abdominal:      General: Bowel sounds are normal. There is no distension.      Palpations: Abdomen is soft. There is no mass.      Tenderness: There is no abdominal tenderness. There is no guarding or rebound.   Lymphadenopathy:      Cervical: No cervical adenopathy.      Right cervical: No superficial cervical adenopathy.     Left cervical: No superficial cervical adenopathy.   Neurological:      Mental Status: He is alert.

## 2025-04-04 NOTE — ED PROVIDER NOTES
Time reflects when diagnosis was documented in both MDM as applicable and the Disposition within this note       Time User Action Codes Description Comment    4/4/2025  6:55 PM Abel Chen Add [R06.6] Hiccups           ED Disposition       ED Disposition   Discharge    Condition   Stable    Date/Time   Fri Apr 4, 2025  7:26 PM    Comment   Corey Parker discharge to home/self care.                   Assessment & Plan       Medical Decision Making  24 old male presenting with hiccups.  Differential diagnosis includes primary hiccups, hiccups secondary to hyponatremia, foreign body in auditory canal, structural abnormality in the chest.  Will check CMP, otoscopic exam, chest x-ray.  No acute findings.  Will try Reglan.  Fortunately, hiccups appear improved at this time.  Will treat with Reglan on a as needed basis.        Problems Addressed:  Hiccups: acute illness or injury    Amount and/or Complexity of Data Reviewed  Labs: ordered.  Radiology: ordered and independent interpretation performed.    Risk  Prescription drug management.             Medications   metoclopramide (REGLAN) injection 10 mg (10 mg Intravenous Given 4/4/25 1900)       ED Risk Strat Scores                            SBIRT 22yo+      Flowsheet Row Most Recent Value   Initial Alcohol Screen: US AUDIT-C     1. How often do you have a drink containing alcohol? 0 Filed at: 04/04/2025 1757   2. How many drinks containing alcohol do you have on a typical day you are drinking?  0 Filed at: 04/04/2025 1757   3a. Male UNDER 65: How often do you have five or more drinks on one occasion? 0 Filed at: 04/04/2025 1757   3b. FEMALE Any Age, or MALE 65+: How often do you have 4 or more drinks on one occassion? 0 Filed at: 04/04/2025 1757   Audit-C Score 0 Filed at: 04/04/2025 1757   TERESA: How many times in the past year have you...    Used an illegal drug or used a prescription medication for non-medical reasons? Never Filed at: 04/04/2025 1757                             History of Present Illness       Chief Complaint   Patient presents with    Hiccups     Patient reports having the hiccups since Tuesday afternoon, unable to get them to go away, was seen at urgent care and referred to the ED.        Past Medical History:   Diagnosis Date    Anxiety     Depression     Schizoaffective disorder (HCC)       Past Surgical History:   Procedure Laterality Date    COLONOSCOPY        Family History   Problem Relation Age of Onset    Depression Mother     No Known Problems Father       Social History     Tobacco Use    Smoking status: Some Days     Types: Cigarettes    Smokeless tobacco: Never    Tobacco comments:     once monthy   Vaping Use    Vaping status: Some Days    Substances: CBD   Substance Use Topics    Alcohol use: Not Currently     Alcohol/week: 2.0 standard drinks of alcohol     Types: 2 Shots of liquor per week    Drug use: Yes     Types: Marijuana      E-Cigarette/Vaping    E-Cigarette Use Current Some Day User     Comments marajuana       E-Cigarette/Vaping Substances    Nicotine No     THC No     CBD Yes     Flavoring No     Other No     Unknown No       I have reviewed and agree with the history as documented.     24-year-old male presenting for hiccups times several days.  States they come and go and are not truly intractable.  This is a new problem.  No recent changes in health otherwise.  On several medications chronically without any recent changes.  States most recent episode of hiccups was shortly after arrival to the ER but no symptoms at the time of my assessment.          Review of Systems   Constitutional:  Negative for chills and fever.   HENT:  Negative for ear pain and sore throat.    Eyes:  Negative for pain and visual disturbance.   Respiratory:  Negative for cough and shortness of breath.    Cardiovascular:  Negative for chest pain and palpitations.   Gastrointestinal:  Negative for abdominal pain and vomiting.   Genitourinary:   Negative for dysuria and hematuria.   Musculoskeletal:  Negative for arthralgias and back pain.   Skin:  Negative for color change and rash.   Neurological:  Negative for seizures and syncope.        Hiccups   All other systems reviewed and are negative.          Objective       ED Triage Vitals [04/04/25 1755]   Temperature Pulse Blood Pressure Respirations SpO2 Patient Position - Orthostatic VS   98.8 °F (37.1 °C) 66 132/77 18 96 % Sitting      Temp Source Heart Rate Source BP Location FiO2 (%) Pain Score    Temporal Monitor Right arm -- 5      Vitals      Date and Time Temp Pulse SpO2 Resp BP Pain Score FACES Pain Rating User   04/04/25 1800 -- 69 96 % 18 127/69 -- -- JL   04/04/25 1755 98.8 °F (37.1 °C) 66 96 % 18 132/77 5 -- SK            Physical Exam  Vitals and nursing note reviewed. Exam conducted with a chaperone present.   Constitutional:       General: He is not in acute distress.     Appearance: He is well-developed.   HENT:      Head: Normocephalic and atraumatic.      Right Ear: Tympanic membrane, ear canal and external ear normal.      Left Ear: Tympanic membrane, ear canal and external ear normal.      Mouth/Throat:      Mouth: Mucous membranes are moist.      Pharynx: Oropharynx is clear.   Eyes:      Conjunctiva/sclera: Conjunctivae normal.   Cardiovascular:      Rate and Rhythm: Normal rate and regular rhythm.      Heart sounds: No murmur heard.  Pulmonary:      Effort: Pulmonary effort is normal. No respiratory distress.      Breath sounds: Normal breath sounds.   Abdominal:      Palpations: Abdomen is soft.      Tenderness: There is no abdominal tenderness.   Musculoskeletal:         General: No swelling.      Cervical back: Neck supple.   Skin:     General: Skin is warm and dry.      Capillary Refill: Capillary refill takes less than 2 seconds.   Neurological:      Mental Status: He is alert.   Psychiatric:         Mood and Affect: Mood normal.         Results Reviewed       Procedure  Component Value Units Date/Time    Comprehensive metabolic panel [[id number]] Collected: 04/04/25 1854    Lab Status: Final result Specimen: Blood from Arm, Right Updated: 04/04/25 1926     Sodium 138 mmol/L      Potassium 3.6 mmol/L      Chloride 104 mmol/L      CO2 24 mmol/L      ANION GAP 10 mmol/L      BUN 12 mg/dL      Creatinine 0.87 mg/dL      Glucose 86 mg/dL      Calcium 9.3 mg/dL      AST 29 U/L      ALT 48 U/L      Alkaline Phosphatase 84 U/L      Total Protein 7.2 g/dL      Albumin 4.3 g/dL      Total Bilirubin 0.43 mg/dL      eGFR 120 ml/min/1.73sq m     Narrative:      [employer] guidelines for Chronic Kidney Disease (CKD):     Stage 1 with normal or high GFR (GFR > 90 mL/min/1.73 square meters)    Stage 2 Mild CKD (GFR = 60-89 mL/min/1.73 square meters)    Stage 3A Moderate CKD (GFR = 45-59 mL/min/1.73 square meters)    Stage 3B Moderate CKD (GFR = 30-44 mL/min/1.73 square meters)    Stage 4 Severe CKD (GFR = 15-29 mL/min/1.73 square meters)    Stage 5 End Stage CKD (GFR <15 mL/min/1.73 square meters)  Note: GFR calculation is accurate only with a steady state creatinine            XR chest 1 view portable   ED Interpretation by Abel Chen DO (04/04 1904)   1 view x-ray of the chest interpreted by myself pending official radiology report.  No acute cardiopulmonary process seen.          Procedures    ED Medication and Procedure Management   Prior to Admission Medications   Prescriptions Last Dose Informant Patient Reported? Taking?   ARIPiprazole (ABILIFY) 2 mg tablet   No No   Sig: Take 1 tablet (2 mg total) by mouth daily   Patient not taking: Reported on 4/4/2025   benzonatate (TESSALON) 200 MG capsule   No No   Sig: Take 1 capsule (200 mg total) by mouth 3 (three) times a day as needed for cough   Patient not taking: Reported on 6/24/2024   cariprazine (VRAYLAR) 3 MG capsule   Yes No   Sig: Take 3 mg by mouth daily   famotidine (PEPCID) 40 MG tablet   No No   Sig:  Take 1 tablet (40 mg total) by mouth daily at bedtime   hydrOXYzine HCL (ATARAX) 50 mg tablet   No No   Sig: Take 1 tablet (50 mg total) by mouth 2 (two) times a day as needed for itching   meclizine (ANTIVERT) 25 mg tablet   No No   Sig: Take 1 tablet (25 mg total) by mouth every 8 (eight) hours as needed for dizziness or nausea   Patient not taking: Reported on 6/24/2024   ondansetron (ZOFRAN) 4 mg tablet   No No   Sig: Take 1 tablet (4 mg total) by mouth every 8 (eight) hours as needed for nausea or vomiting   pantoprazole (PROTONIX) 40 mg tablet   No No   Sig: Take 1 tablet (40 mg total) by mouth 2 (two) times a day   sertraline (ZOLOFT) 100 mg tablet   No No   Sig: Take 2 tablets (200 mg total) by mouth daily   traZODone (DESYREL) 100 mg tablet   No No   Sig: Take 1 tablet (100 mg total) by mouth daily at bedtime      Facility-Administered Medications: None     Discharge Medication List as of 4/4/2025  7:27 PM        START taking these medications    Details   metoclopramide (Reglan) 10 mg tablet Take 1 tablet (10 mg total) by mouth every 6 (six) hours as needed (hiccups), Starting Fri 4/4/2025, Normal           CONTINUE these medications which have NOT CHANGED    Details   ARIPiprazole (ABILIFY) 2 mg tablet Take 1 tablet (2 mg total) by mouth daily, Starting Tue 3/11/2025, Normal      benzonatate (TESSALON) 200 MG capsule Take 1 capsule (200 mg total) by mouth 3 (three) times a day as needed for cough, Starting Tue 6/4/2024, Normal      cariprazine (VRAYLAR) 3 MG capsule Take 3 mg by mouth daily, Historical Med      famotidine (PEPCID) 40 MG tablet Take 1 tablet (40 mg total) by mouth daily at bedtime, Starting Thu 11/2/2023, Normal      hydrOXYzine HCL (ATARAX) 50 mg tablet Take 1 tablet (50 mg total) by mouth 2 (two) times a day as needed for itching, Starting Fri 1/31/2025, Normal      meclizine (ANTIVERT) 25 mg tablet Take 1 tablet (25 mg total) by mouth every 8 (eight) hours as needed for dizziness or  nausea, Starting Wed 6/14/2023, Normal      ondansetron (ZOFRAN) 4 mg tablet Take 1 tablet (4 mg total) by mouth every 8 (eight) hours as needed for nausea or vomiting, Starting Thu 11/2/2023, Normal      pantoprazole (PROTONIX) 40 mg tablet Take 1 tablet (40 mg total) by mouth 2 (two) times a day, Starting Wed 7/24/2024, Normal      sertraline (ZOLOFT) 100 mg tablet Take 2 tablets (200 mg total) by mouth daily, Starting Fri 1/31/2025, Until Thu 5/1/2025, Normal      traZODone (DESYREL) 100 mg tablet Take 1 tablet (100 mg total) by mouth daily at bedtime, Starting Fri 1/31/2025, Until Thu 5/1/2025, Normal           No discharge procedures on file.  ED SEPSIS DOCUMENTATION   Time reflects when diagnosis was documented in both MDM as applicable and the Disposition within this note       Time User Action Codes Description Comment    4/4/2025  6:55 PM Abel Chen Add [R06.6] Marlene Chen DO  04/04/25 8084

## 2025-04-07 ENCOUNTER — VBI (OUTPATIENT)
Dept: FAMILY MEDICINE CLINIC | Facility: CLINIC | Age: 25
End: 2025-04-07

## 2025-04-07 NOTE — TELEPHONE ENCOUNTER
04/07/25 12:39 PM    Patient contacted post ED visit, [facility] department spoke with patient/caregiver and outreach was successful.    Thank you.  Clinton Magallanes MA  [facility]

## 2025-05-12 ENCOUNTER — HOSPITAL ENCOUNTER (EMERGENCY)
Facility: HOSPITAL | Age: 25
Discharge: HOME | End: 2025-05-12
Attending: EMERGENCY MEDICINE
Payer: COMMERCIAL

## 2025-05-12 VITALS
RESPIRATION RATE: 15 BRPM | WEIGHT: 263.89 LBS | BODY MASS INDEX: 36.94 KG/M2 | DIASTOLIC BLOOD PRESSURE: 71 MMHG | OXYGEN SATURATION: 92 % | HEART RATE: 61 BPM | TEMPERATURE: 97.7 F | SYSTOLIC BLOOD PRESSURE: 125 MMHG | HEIGHT: 71 IN

## 2025-05-12 DIAGNOSIS — K22.6 MALLORY-WEISS TEAR: ICD-10-CM

## 2025-05-12 DIAGNOSIS — R11.2 NAUSEA AND VOMITING: Primary | ICD-10-CM

## 2025-05-12 LAB
ALBUMIN SERPL BCG-MCNC: 4.2 G/DL (ref 3.5–5)
ALP SERPL-CCNC: 87 U/L (ref 34–104)
ALT SERPL W P-5'-P-CCNC: 44 U/L (ref 7–52)
ANION GAP SERPL CALCULATED.3IONS-SCNC: 7 MMOL/L (ref 4–13)
AST SERPL W P-5'-P-CCNC: 26 U/L (ref 13–39)
BASOPHILS # BLD AUTO: 0.04 THOUSANDS/ÂΜL (ref 0–0.1)
BASOPHILS NFR BLD AUTO: 1 % (ref 0–1)
BILIRUB SERPL-MCNC: 0.38 MG/DL (ref 0.2–1)
BUN SERPL-MCNC: 9 MG/DL (ref 5–25)
CALCIUM SERPL-MCNC: 9 MG/DL (ref 8.4–10.2)
CHLORIDE SERPL-SCNC: 102 MMOL/L (ref 96–108)
CO2 SERPL-SCNC: 27 MMOL/L (ref 21–32)
CREAT SERPL-MCNC: 0.87 MG/DL (ref 0.6–1.3)
EOSINOPHIL # BLD AUTO: 0.24 THOUSAND/ÂΜL (ref 0–0.61)
EOSINOPHIL NFR BLD AUTO: 3 % (ref 0–6)
ERYTHROCYTE [DISTWIDTH] IN BLOOD BY AUTOMATED COUNT: 12.2 % (ref 11.6–15.1)
GFR SERPL CREATININE-BSD FRML MDRD: 119 ML/MIN/1.73SQ M
GLUCOSE SERPL-MCNC: 99 MG/DL (ref 65–140)
HCT VFR BLD AUTO: 44.1 % (ref 36.5–49.3)
HGB BLD-MCNC: 15.3 G/DL (ref 12–17)
HOLD SPECIMEN: NORMAL
IMM GRANULOCYTES # BLD AUTO: 0.02 THOUSAND/UL (ref 0–0.2)
IMM GRANULOCYTES NFR BLD AUTO: 0 % (ref 0–2)
LIPASE SERPL-CCNC: 27 U/L (ref 11–82)
LYMPHOCYTES # BLD AUTO: 2.58 THOUSANDS/ÂΜL (ref 0.6–4.47)
LYMPHOCYTES NFR BLD AUTO: 33 % (ref 14–44)
MCH RBC QN AUTO: 30.5 PG (ref 26.8–34.3)
MCHC RBC AUTO-ENTMCNC: 34.7 G/DL (ref 31.4–37.4)
MCV RBC AUTO: 88 FL (ref 82–98)
MONOCYTES # BLD AUTO: 0.79 THOUSAND/ÂΜL (ref 0.17–1.22)
MONOCYTES NFR BLD AUTO: 10 % (ref 4–12)
NEUTROPHILS # BLD AUTO: 4.12 THOUSANDS/ÂΜL (ref 1.85–7.62)
NEUTS SEG NFR BLD AUTO: 53 % (ref 43–75)
NRBC BLD AUTO-RTO: 0 /100 WBCS
PLATELET # BLD AUTO: 288 THOUSANDS/UL (ref 149–390)
PMV BLD AUTO: 10.3 FL (ref 8.9–12.7)
POTASSIUM SERPL-SCNC: 3.8 MMOL/L (ref 3.5–5.3)
PROT SERPL-MCNC: 7 G/DL (ref 6.4–8.4)
RBC # BLD AUTO: 5.01 MILLION/UL (ref 3.88–5.62)
SODIUM SERPL-SCNC: 136 MMOL/L (ref 135–147)
WBC # BLD AUTO: 7.79 THOUSAND/UL (ref 4.31–10.16)

## 2025-05-12 PROCEDURE — 96365 THER/PROPH/DIAG IV INF INIT: CPT

## 2025-05-12 PROCEDURE — 83690 ASSAY OF LIPASE: CPT

## 2025-05-12 PROCEDURE — 85025 COMPLETE CBC W/AUTO DIFF WBC: CPT

## 2025-05-12 PROCEDURE — 80053 COMPREHEN METABOLIC PANEL: CPT

## 2025-05-12 PROCEDURE — 99283 EMERGENCY DEPT VISIT LOW MDM: CPT

## 2025-05-12 PROCEDURE — 36415 COLL VENOUS BLD VENIPUNCTURE: CPT

## 2025-05-12 PROCEDURE — 99284 EMERGENCY DEPT VISIT MOD MDM: CPT | Performed by: EMERGENCY MEDICINE

## 2025-05-12 PROCEDURE — 96375 TX/PRO/DX INJ NEW DRUG ADDON: CPT

## 2025-05-12 RX ORDER — ONDANSETRON 2 MG/ML
4 INJECTION INTRAMUSCULAR; INTRAVENOUS ONCE
Status: COMPLETED | OUTPATIENT
Start: 2025-05-12 | End: 2025-05-12

## 2025-05-12 RX ORDER — ONDANSETRON 4 MG/1
4 TABLET, ORALLY DISINTEGRATING ORAL EVERY 6 HOURS PRN
Qty: 20 TABLET | Refills: 0 | Status: SHIPPED | OUTPATIENT
Start: 2025-05-12

## 2025-05-12 RX ORDER — SODIUM CHLORIDE, SODIUM GLUCONATE, SODIUM ACETATE, POTASSIUM CHLORIDE, MAGNESIUM CHLORIDE, SODIUM PHOSPHATE, DIBASIC, AND POTASSIUM PHOSPHATE .53; .5; .37; .037; .03; .012; .00082 G/100ML; G/100ML; G/100ML; G/100ML; G/100ML; G/100ML; G/100ML
2000 INJECTION, SOLUTION INTRAVENOUS ONCE
Status: COMPLETED | OUTPATIENT
Start: 2025-05-12 | End: 2025-05-12

## 2025-05-12 RX ADMIN — ONDANSETRON 4 MG: 2 INJECTION INTRAMUSCULAR; INTRAVENOUS at 07:35

## 2025-05-12 RX ADMIN — SODIUM CHLORIDE, SODIUM GLUCONATE, SODIUM ACETATE, POTASSIUM CHLORIDE, MAGNESIUM CHLORIDE, SODIUM PHOSPHATE, DIBASIC, AND POTASSIUM PHOSPHATE 2000 ML: .53; .5; .37; .037; .03; .012; .00082 INJECTION, SOLUTION INTRAVENOUS at 07:35

## 2025-05-12 NOTE — ED PROVIDER NOTES
Time reflects when diagnosis was documented in both MDM as applicable and the Disposition within this note       Time User Action Codes Description Comment    5/12/2025  8:10 AM Corey Haynes Add [R11.2] Nausea and vomiting     5/12/2025  8:10 AM Corey Haynes Add [K22.6] Katherine-Woodward tear           ED Disposition       ED Disposition   Discharge    Condition   Stable    Date/Time   Mon May 12, 2025  8:10 AM    Comment   Corey Parker discharge to home/self care.                   Assessment & Plan       Medical Decision Making  Patient is a 25-year-old male presenting with nausea vomiting that began earlier this morning.  Patient states he vomited 3 times this morning.  He noted that the last time he vomited there were streaks of blood in the vomitus.  The first 2 times he vomited he did not have any blood in the vomitus.    History of blood streaks and the third episode of vomiting consistent with Katherine-Woodward tear.  Labs reviewed within normal range.  Patient feels better after IV fluids and IV antiemetics.  Will prescription for Zofran.  Recommended bland/brat diet advance gently as tolerated.  Work note.    Is for acute surgical abdominal process.  No distress sepsis, severe sepsis or septic shock.  No evidence for acute renal failure or acute kidney injury.    Problems Addressed:  Katherine-Woodward tear: acute illness or injury  Nausea and vomiting: acute illness or injury    Amount and/or Complexity of Data Reviewed  Labs: ordered. Decision-making details documented in ED Course.    Risk  OTC drugs.  Prescription drug management.  Decision regarding hospitalization.             Medications   multi-electrolyte (Plasmalyte-A/Isolyte-S PH 7.4/Normosol-R) IV bolus 2,000 mL (0 mL Intravenous Stopped 5/12/25 0820)   ondansetron (ZOFRAN) injection 4 mg (4 mg Intravenous Given 5/12/25 2335)       ED Risk Strat Scores          I personally discussed return precautions with this patient and family. I provided  the patient with written discharge instructions and particularly highlighted specific areas of interest to this patient, including but not limited to: medications for symptom managment, follow up recommendations, and return precautions. Patient and family are in agreement with this plan as outlined above.          No data recorded        SBIRT 22yo+      Flowsheet Row Most Recent Value   Initial Alcohol Screen: US AUDIT-C     1. How often do you have a drink containing alcohol? 0 Filed at: 05/12/2025 0645   2. How many drinks containing alcohol do you have on a typical day you are drinking?  0 Filed at: 05/12/2025 0645   3a. Male UNDER 65: How often do you have five or more drinks on one occasion? 0 Filed at: 05/12/2025 0645   3b. FEMALE Any Age, or MALE 65+: How often do you have 4 or more drinks on one occassion? 0 Filed at: 05/12/2025 0645   Audit-C Score 0 Filed at: 05/12/2025 0645   TERESA: How many times in the past year have you...    Used an illegal drug or used a prescription medication for non-medical reasons? Never Filed at: 05/12/2025 0645                            History of Present Illness       Chief Complaint   Patient presents with    Abdominal Pain     Patient reports mid abdominal pain and vomiting of blood this morning.        Past Medical History:   Diagnosis Date    Anxiety     Depression     Schizoaffective disorder (HCC)       Past Surgical History:   Procedure Laterality Date    COLONOSCOPY        Family History   Problem Relation Age of Onset    Depression Mother     No Known Problems Father       Social History     Tobacco Use    Smoking status: Some Days     Types: Cigarettes    Smokeless tobacco: Never    Tobacco comments:     once monthy   Vaping Use    Vaping status: Some Days    Substances: CBD   Substance Use Topics    Alcohol use: Not Currently     Alcohol/week: 2.0 standard drinks of alcohol     Types: 2 Shots of liquor per week    Drug use: Yes     Types: Marijuana       E-Cigarette/Vaping    E-Cigarette Use Current Some Day User     Comments rosanna       E-Cigarette/Vaping Substances    Nicotine No     THC No     CBD Yes     Flavoring No     Other No     Unknown No       I have reviewed and agree with the history as documented.     Patient is a 25-year-old male presenting with nausea vomiting that began earlier this morning.  Patient states he vomited 3 times this morning.  He noted that the last time he vomited there were streaks of blood in the vomitus.  The first 2 times he vomited he did not have any blood in the vomitus.  No other sick contacts.  No other family members sick with similar symptoms.  Denies any fevers or chills.  No recent antibiotics.  No recent change in medications.  Denies any alcohol or drug abuse but does occasionally he uses medical marijuana.  No recent travel.  No recent changes in dietary intake.  Occasional crampy abdominal pain.          Review of Systems   Constitutional:  Negative for activity change, appetite change, chills and fever.   HENT:  Negative for ear pain, hearing loss, rhinorrhea, sneezing, sore throat and trouble swallowing.    Eyes:  Negative for pain and visual disturbance.   Respiratory:  Negative for cough, choking, chest tightness, shortness of breath, wheezing and stridor.    Cardiovascular:  Negative for chest pain, palpitations and leg swelling.   Gastrointestinal:  Positive for abdominal pain, nausea and vomiting. Negative for constipation and diarrhea.   Genitourinary:  Negative for difficulty urinating, dysuria, frequency, hematuria and testicular pain.   Musculoskeletal:  Negative for arthralgias, back pain, gait problem and neck pain.   Skin:  Negative for color change and rash.   Allergic/Immunologic: Negative for immunocompromised state.   Neurological:  Negative for dizziness, seizures, syncope, speech difficulty, weakness, light-headedness, numbness and headaches.   All other systems reviewed and are  negative.          Objective       ED Triage Vitals [05/12/25 0645]   Temperature Pulse Blood Pressure Respirations SpO2 Patient Position - Orthostatic VS   97.7 °F (36.5 °C) 71 119/64 15 95 % Sitting      Temp Source Heart Rate Source BP Location FiO2 (%) Pain Score    Temporal Monitor Left arm -- 6      Vitals      Date and Time Temp Pulse SpO2 Resp BP Pain Score FACES Pain Rating User   05/12/25 0800 -- 61 92 % 15 125/71 -- -- PP   05/12/25 0730 -- 58 94 % 15 115/76 -- -- PP   05/12/25 0645 97.7 °F (36.5 °C) 71 95 % 15 119/64 6 -- PP            Physical Exam  Vitals and nursing note reviewed.   Constitutional:       General: He is not in acute distress.     Appearance: Normal appearance. He is well-developed and normal weight. He is not ill-appearing, toxic-appearing or diaphoretic.   HENT:      Head: Normocephalic and atraumatic.      Nose: Nose normal.      Mouth/Throat:      Mouth: Mucous membranes are moist.      Pharynx: Oropharynx is clear.   Eyes:      General: No scleral icterus.     Extraocular Movements: Extraocular movements intact.      Conjunctiva/sclera: Conjunctivae normal.   Cardiovascular:      Rate and Rhythm: Normal rate and regular rhythm.      Pulses: Normal pulses.      Heart sounds: Normal heart sounds. No murmur heard.  Pulmonary:      Effort: Pulmonary effort is normal. No respiratory distress.      Breath sounds: Normal breath sounds. No wheezing or rales.   Chest:      Chest wall: No tenderness.   Abdominal:      General: Bowel sounds are normal. There is no distension.      Palpations: Abdomen is soft. There is no mass.      Tenderness: There is no abdominal tenderness. There is no right CVA tenderness, left CVA tenderness, guarding or rebound. Negative signs include Segundo's sign and McBurney's sign.      Hernia: No hernia is present.   Musculoskeletal:         General: No tenderness, deformity or signs of injury. Normal range of motion.      Cervical back: Normal range of motion and  neck supple. No rigidity or tenderness.      Right lower leg: No edema.      Left lower leg: No edema.   Lymphadenopathy:      Cervical: No cervical adenopathy.   Skin:     General: Skin is warm and dry.      Capillary Refill: Capillary refill takes less than 2 seconds.      Coloration: Skin is not jaundiced or pale.      Findings: No bruising, erythema, lesion or rash.   Neurological:      General: No focal deficit present.      Mental Status: He is alert and oriented to person, place, and time. Mental status is at baseline.      Motor: No abnormal muscle tone.   Psychiatric:         Mood and Affect: Mood normal.         Behavior: Behavior normal.         Results Reviewed       Procedure Component Value Units Date/Time    [place] draw [[id number]] Collected: 05/12/25 0654    Lab Status: In process Specimen: Blood from Arm, Right Updated: 05/12/25 0801    Narrative:      The following orders were created for panel order [place] draw.  Procedure                               Abnormality         Status                     ---------                               -----------         ------                     Light Blue Top on hold[[id number]]                           Final result               Green / Black tube on hold[[id number]]                       Final result               Lavender Top 7ml on hold[[id number]]                         In process                   Please view results for these tests on the individual orders.    Comprehensive metabolic panel [[id number]] Collected: 05/12/25 0654    Lab Status: Final result Specimen: Blood from Arm, Right Updated: 05/12/25 0716     Sodium 136 mmol/L      Potassium 3.8 mmol/L      Chloride 102 mmol/L      CO2 27 mmol/L      ANION GAP 7 mmol/L      BUN 9 mg/dL      Creatinine 0.87 mg/dL      Glucose 99 mg/dL      Calcium 9.0 mg/dL      AST 26 U/L      ALT 44 U/L      Alkaline Phosphatase 87 U/L      Total Protein 7.0 g/dL      Albumin 4.2 g/dL      Total Bilirubin 0.38  mg/dL      eGFR 119 ml/min/1.73sq m     Narrative:      National Kid[employer] Foundation guidelines for Chronic Kidney Disease (CKD):     Stage 1 with normal or high GFR (GFR > 90 mL/min/1.73 square meters)    Stage 2 Mild CKD (GFR = 60-89 mL/min/1.73 square meters)    Stage 3A Moderate CKD (GFR = 45-59 mL/min/1.73 square meters)    Stage 3B Moderate CKD (GFR = 30-44 mL/min/1.73 square meters)    Stage 4 Severe CKD (GFR = 15-29 mL/min/1.73 square meters)    Stage 5 End Stage CKD (GFR <15 mL/min/1.73 square meters)  Note: GFR calculation is accurate only with a steady state creatinine    Lipase [[id number]]  (Normal) Collected: 05/12/25 0654    Lab Status: Final result Specimen: Blood from Arm, Right Updated: 05/12/25 0716     Lipase 27 u/L     CBC and differential [[id number]] Collected: 05/12/25 0654    Lab Status: Final result Specimen: Blood from Arm, Right Updated: 05/12/25 0701     WBC 7.79 Thousand/uL      RBC 5.01 Million/uL      Hemoglobin 15.3 g/dL      Hematocrit 44.1 %      MCV 88 fL      MCH 30.5 pg      MCHC 34.7 g/dL      RDW 12.2 %      MPV 10.3 fL      Platelets 288 Thousands/uL      nRBC 0 /100 WBCs      Segmented % 53 %      Immature Grans % 0 %      Lymphocytes % 33 %      Monocytes % 10 %      Eosinophils Relative 3 %      Basophils Relative 1 %      Absolute Neutrophils 4.12 Thousands/µL      Absolute Immature Grans 0.02 Thousand/uL      Absolute Lymphocytes 2.58 Thousands/µL      Absolute Monocytes 0.79 Thousand/µL      Eosinophils Absolute 0.24 Thousand/µL      Basophils Absolute 0.04 Thousands/µL             No orders to display       Procedures    ED Medication and Procedure Management   Prior to Admission Medications   Prescriptions Last Dose Informant Patient Reported? Taking?   ARIPiprazole (ABILIFY) 2 mg tablet 5/11/2025  No Yes   Sig: Take 1 tablet (2 mg total) by mouth daily   benzonatate (TESSALON) 200 MG capsule Not Taking  No No   Sig: Take 1 capsule (200 mg total) by mouth 3  (three) times a day as needed for cough   Patient not taking: Reported on 5/12/2025   cariprazine (VRAYLAR) 3 MG capsule 5/11/2025  Yes Yes   Sig: Take 3 mg by mouth daily   famotidine (PEPCID) 40 MG tablet Not Taking  No No   Sig: Take 1 tablet (40 mg total) by mouth daily at bedtime   Patient not taking: Reported on 5/12/2025   hydrOXYzine HCL (ATARAX) 50 mg tablet 5/11/2025  No Yes   Sig: Take 1 tablet (50 mg total) by mouth 2 (two) times a day as needed for itching   meclizine (ANTIVERT) 25 mg tablet Not Taking  No No   Sig: Take 1 tablet (25 mg total) by mouth every 8 (eight) hours as needed for dizziness or nausea   Patient not taking: Reported on 5/12/2025   metoclopramide (Reglan) 10 mg tablet Not Taking  No No   Sig: Take 1 tablet (10 mg total) by mouth every 6 (six) hours as needed (hiccups)   Patient not taking: Reported on 5/12/2025   ondansetron (ZOFRAN) 4 mg tablet Not Taking  No No   Sig: Take 1 tablet (4 mg total) by mouth every 8 (eight) hours as needed for nausea or vomiting   Patient not taking: Reported on 5/12/2025   pantoprazole (PROTONIX) 40 mg tablet 5/11/2025  No Yes   Sig: Take 1 tablet (40 mg total) by mouth 2 (two) times a day   sertraline (ZOLOFT) 100 mg tablet 5/11/2025  No Yes   Sig: Take 2 tablets (200 mg total) by mouth daily   traZODone (DESYREL) 100 mg tablet 5/11/2025  No Yes   Sig: Take 1 tablet (100 mg total) by mouth daily at bedtime      Facility-Administered Medications: None     Discharge Medication List as of 5/12/2025  8:16 AM        START taking these medications    Details   ondansetron (ZOFRAN-ODT) 4 mg disintegrating tablet Take 1 tablet (4 mg total) by mouth every 6 (six) hours as needed for nausea or vomiting for up to 20 doses, Starting Mon 5/12/2025, Normal           CONTINUE these medications which have NOT CHANGED    Details   ARIPiprazole (ABILIFY) 2 mg tablet Take 1 tablet (2 mg total) by mouth daily, Starting Tue 3/11/2025, Normal      cariprazine (VRAYLAR) 3  MG capsule Take 3 mg by mouth daily, Historical Med      hydrOXYzine HCL (ATARAX) 50 mg tablet Take 1 tablet (50 mg total) by mouth 2 (two) times a day as needed for itching, Starting Fri 1/31/2025, Normal      pantoprazole (PROTONIX) 40 mg tablet Take 1 tablet (40 mg total) by mouth 2 (two) times a day, Starting Wed 7/24/2024, Normal      sertraline (ZOLOFT) 100 mg tablet Take 2 tablets (200 mg total) by mouth daily, Starting Fri 1/31/2025, Until Mon 5/12/2025, Normal      traZODone (DESYREL) 100 mg tablet Take 1 tablet (100 mg total) by mouth daily at bedtime, Starting Fri 1/31/2025, Until Mon 5/12/2025, Normal      benzonatate (TESSALON) 200 MG capsule Take 1 capsule (200 mg total) by mouth 3 (three) times a day as needed for cough, Starting Tue 6/4/2024, Normal      famotidine (PEPCID) 40 MG tablet Take 1 tablet (40 mg total) by mouth daily at bedtime, Starting Thu 11/2/2023, Normal      meclizine (ANTIVERT) 25 mg tablet Take 1 tablet (25 mg total) by mouth every 8 (eight) hours as needed for dizziness or nausea, Starting Wed 6/14/2023, Normal      metoclopramide (Reglan) 10 mg tablet Take 1 tablet (10 mg total) by mouth every 6 (six) hours as needed (hiccups), Starting Fri 4/4/2025, Normal      ondansetron (ZOFRAN) 4 mg tablet Take 1 tablet (4 mg total) by mouth every 8 (eight) hours as needed for nausea or vomiting, Starting Thu 11/2/2023, Normal           No discharge procedures on file.  ED SEPSIS DOCUMENTATION   Time reflects when diagnosis was documented in both MDM as applicable and the Disposition within this note       Time User Action Codes Description Comment    5/12/2025  8:10 AM Corey Haynes Add [R11.2] Nausea and vomiting     5/12/2025  8:10 AM Corey Haynes Add [K22.6] Katherine-Woodward tear                  Corey Haynes,   05/12/25 103

## 2025-05-12 NOTE — Clinical Note
Corey Parker was seen and treated in our emergency department on 5/12/2025.                Diagnosis:     Corey  may return to work on return date.    He may return on this date: 05/13/2025         If you have any questions or concerns, please don't hesitate to call.      Corey Hanyes, DO    ______________________________           _______________          _______________  Hospital Representative                              Date                                Time

## 2025-05-13 ENCOUNTER — VBI (OUTPATIENT)
Dept: FAMILY MEDICINE CLINIC | Facility: CLINIC | Age: 25
End: 2025-05-13

## 2025-05-13 LAB — HOLD SPECIMEN: NORMAL

## 2025-05-13 NOTE — TELEPHONE ENCOUNTER
05/13/25 10:17 AM    Patient contacted post ED visit, first outreach attempt made. Message was left for patient to return a call to the VBI Department at [facility]: Phone [phone].    Thank you.  Clinton Magallanes MA  [facility]

## 2025-05-14 NOTE — TELEPHONE ENCOUNTER
05/14/25 12:21 PM    Patient contacted post ED visit, outreach attempt made but message could not be left. Additional outreach attempt will be made.     Thank you.  Clinton Magallanes MA  [facility]

## 2025-05-15 NOTE — TELEPHONE ENCOUNTER
05/15/25 10:27 AM    Patient contacted post ED visit, VBI department spoke with patient/caregiver and outreach was successful.    Thank you.  Clinton Magallanes MA  [facility]

## 2025-06-05 DIAGNOSIS — K21.9 GASTROESOPHAGEAL REFLUX DISEASE WITHOUT ESOPHAGITIS: ICD-10-CM

## 2025-06-05 DIAGNOSIS — R11.2 NAUSEA AND VOMITING: ICD-10-CM

## 2025-06-06 RX ORDER — ONDANSETRON 4 MG/1
4 TABLET, ORALLY DISINTEGRATING ORAL EVERY 6 HOURS PRN
Qty: 20 TABLET | Refills: 0 | OUTPATIENT
Start: 2025-06-06

## 2025-06-06 RX ORDER — PANTOPRAZOLE SODIUM 40 MG/1
40 TABLET, DELAYED RELEASE ORAL 2 TIMES DAILY
Qty: 60 TABLET | Refills: 0 | Status: SHIPPED | OUTPATIENT
Start: 2025-06-06

## 2025-06-06 NOTE — TELEPHONE ENCOUNTER
Patient needs an appointment. Please contact the patient to schedule an appointment. Last office visit: 6/24/24

## 2025-06-10 DIAGNOSIS — K21.9 GASTROESOPHAGEAL REFLUX DISEASE WITHOUT ESOPHAGITIS: ICD-10-CM

## 2025-06-11 RX ORDER — FAMOTIDINE 40 MG/1
40 TABLET, FILM COATED ORAL
Qty: 30 TABLET | Refills: 0 | OUTPATIENT
Start: 2025-06-11

## 2025-06-11 NOTE — TELEPHONE ENCOUNTER
Patient not seen since 2023.  Needs office visit before any further refills to be granted.  Can utilize over-the-counter famotidine until office visit made.